# Patient Record
Sex: FEMALE | Race: WHITE | Employment: OTHER | URBAN - NONMETROPOLITAN AREA
[De-identification: names, ages, dates, MRNs, and addresses within clinical notes are randomized per-mention and may not be internally consistent; named-entity substitution may affect disease eponyms.]

---

## 2017-01-03 RX ORDER — ALENDRONATE SODIUM 70 MG/1
TABLET ORAL
Qty: 12 TABLET | Refills: 0 | Status: SHIPPED | OUTPATIENT
Start: 2017-01-03 | End: 2017-03-25 | Stop reason: SDUPTHER

## 2017-01-15 DIAGNOSIS — Z78.0 POST-MENOPAUSAL: ICD-10-CM

## 2017-01-15 DIAGNOSIS — C50.911 BREAST CANCER, RIGHT (HCC): ICD-10-CM

## 2017-01-16 RX ORDER — ANASTROZOLE 1 MG/1
TABLET ORAL
Qty: 90 TABLET | Refills: 0 | Status: SHIPPED | OUTPATIENT
Start: 2017-01-16 | End: 2017-04-13 | Stop reason: SDUPTHER

## 2017-02-24 ENCOUNTER — OFFICE VISIT (OUTPATIENT)
Dept: ONCOLOGY | Age: 67
End: 2017-02-24

## 2017-02-24 VITALS
WEIGHT: 199.4 LBS | HEART RATE: 94 BPM | HEIGHT: 63 IN | RESPIRATION RATE: 18 BRPM | OXYGEN SATURATION: 95 % | BODY MASS INDEX: 35.33 KG/M2 | TEMPERATURE: 97.5 F | SYSTOLIC BLOOD PRESSURE: 140 MMHG | DIASTOLIC BLOOD PRESSURE: 72 MMHG

## 2017-02-24 DIAGNOSIS — Z51.81 ENCOUNTER FOR MONITORING AROMATASE INHIBITOR THERAPY: Primary | ICD-10-CM

## 2017-02-24 DIAGNOSIS — Z79.811 ENCOUNTER FOR MONITORING AROMATASE INHIBITOR THERAPY: Primary | ICD-10-CM

## 2017-02-24 PROCEDURE — 99214 OFFICE O/P EST MOD 30 MIN: CPT | Performed by: INTERNAL MEDICINE

## 2017-03-01 ENCOUNTER — OFFICE VISIT (OUTPATIENT)
Dept: FAMILY MEDICINE CLINIC | Age: 67
End: 2017-03-01

## 2017-03-01 VITALS
SYSTOLIC BLOOD PRESSURE: 114 MMHG | BODY MASS INDEX: 36.7 KG/M2 | WEIGHT: 199.4 LBS | HEART RATE: 62 BPM | RESPIRATION RATE: 12 BRPM | DIASTOLIC BLOOD PRESSURE: 68 MMHG | TEMPERATURE: 97.9 F | HEIGHT: 62 IN

## 2017-03-01 DIAGNOSIS — M15.9 PRIMARY OSTEOARTHRITIS INVOLVING MULTIPLE JOINTS: Chronic | ICD-10-CM

## 2017-03-01 DIAGNOSIS — R73.03 PREDIABETES: Chronic | ICD-10-CM

## 2017-03-01 DIAGNOSIS — E78.00 PURE HYPERCHOLESTEROLEMIA: Chronic | ICD-10-CM

## 2017-03-01 DIAGNOSIS — F41.1 GAD (GENERALIZED ANXIETY DISORDER): Chronic | ICD-10-CM

## 2017-03-01 DIAGNOSIS — I10 ESSENTIAL (PRIMARY) HYPERTENSION: Primary | Chronic | ICD-10-CM

## 2017-03-01 DIAGNOSIS — F33.42 RECURRENT MAJOR DEPRESSIVE DISORDER, IN FULL REMISSION (HCC): ICD-10-CM

## 2017-03-01 DIAGNOSIS — R73.9 HYPERGLYCEMIA: ICD-10-CM

## 2017-03-01 PROCEDURE — 99214 OFFICE O/P EST MOD 30 MIN: CPT | Performed by: FAMILY MEDICINE

## 2017-03-01 RX ORDER — DIPHENHYDRAMINE HCL 50 MG
50 CAPSULE ORAL NIGHTLY PRN
COMMUNITY
End: 2018-10-29

## 2017-03-26 DIAGNOSIS — F41.9 ANXIETY: ICD-10-CM

## 2017-03-26 DIAGNOSIS — F33.0 MAJOR DEPRESSIVE DISORDER, RECURRENT EPISODE, MILD (HCC): ICD-10-CM

## 2017-03-27 RX ORDER — CITALOPRAM 20 MG/1
TABLET ORAL
Qty: 90 TABLET | Refills: 3 | Status: SHIPPED | OUTPATIENT
Start: 2017-03-27 | End: 2018-04-17 | Stop reason: SDUPTHER

## 2017-03-27 RX ORDER — ALENDRONATE SODIUM 70 MG/1
TABLET ORAL
Qty: 12 TABLET | Refills: 0 | Status: SHIPPED | OUTPATIENT
Start: 2017-03-27 | End: 2017-06-16 | Stop reason: SDUPTHER

## 2017-04-13 DIAGNOSIS — Z78.0 POST-MENOPAUSAL: ICD-10-CM

## 2017-04-13 DIAGNOSIS — C50.911 BREAST CANCER, RIGHT (HCC): ICD-10-CM

## 2017-04-13 RX ORDER — ANASTROZOLE 1 MG/1
TABLET ORAL
Qty: 90 TABLET | Refills: 0 | Status: SHIPPED | OUTPATIENT
Start: 2017-04-13 | End: 2017-07-11 | Stop reason: SDUPTHER

## 2017-04-27 LAB — HBA1C MFR BLD: 6.1 %

## 2017-05-03 ENCOUNTER — TELEPHONE (OUTPATIENT)
Dept: FAMILY MEDICINE CLINIC | Age: 67
End: 2017-05-03

## 2017-05-22 RX ORDER — BUSPIRONE HYDROCHLORIDE 10 MG/1
TABLET ORAL
Qty: 60 TABLET | Refills: 11 | Status: SHIPPED | OUTPATIENT
Start: 2017-05-22 | End: 2018-05-24 | Stop reason: SDUPTHER

## 2017-06-17 RX ORDER — ALENDRONATE SODIUM 70 MG/1
TABLET ORAL
Qty: 12 TABLET | Refills: 0 | Status: SHIPPED | OUTPATIENT
Start: 2017-06-17 | End: 2017-09-05 | Stop reason: SDUPTHER

## 2017-06-28 ENCOUNTER — OFFICE VISIT (OUTPATIENT)
Dept: CARDIOLOGY | Age: 67
End: 2017-06-28

## 2017-06-28 VITALS
HEIGHT: 62 IN | WEIGHT: 197.6 LBS | SYSTOLIC BLOOD PRESSURE: 160 MMHG | HEART RATE: 64 BPM | BODY MASS INDEX: 36.36 KG/M2 | DIASTOLIC BLOOD PRESSURE: 88 MMHG

## 2017-06-28 DIAGNOSIS — I10 ESSENTIAL HYPERTENSION: Primary | ICD-10-CM

## 2017-06-28 DIAGNOSIS — E78.01 FAMILIAL HYPERCHOLESTEROLEMIA: ICD-10-CM

## 2017-06-28 PROCEDURE — 99213 OFFICE O/P EST LOW 20 MIN: CPT | Performed by: NUCLEAR MEDICINE

## 2017-07-11 DIAGNOSIS — Z78.0 POST-MENOPAUSAL: ICD-10-CM

## 2017-07-11 DIAGNOSIS — C50.911 BREAST CANCER, RIGHT (HCC): ICD-10-CM

## 2017-07-11 RX ORDER — ANASTROZOLE 1 MG/1
TABLET ORAL
Qty: 90 TABLET | Refills: 0 | Status: SHIPPED | OUTPATIENT
Start: 2017-07-11 | End: 2017-12-26 | Stop reason: SDUPTHER

## 2017-07-12 RX ORDER — VALSARTAN AND HYDROCHLOROTHIAZIDE 320; 25 MG/1; MG/1
TABLET, FILM COATED ORAL
Qty: 90 TABLET | Refills: 0 | Status: SHIPPED | OUTPATIENT
Start: 2017-07-12 | End: 2017-10-16 | Stop reason: SDUPTHER

## 2017-08-07 ENCOUNTER — TELEPHONE (OUTPATIENT)
Dept: FAMILY MEDICINE CLINIC | Age: 67
End: 2017-08-07

## 2017-08-07 DIAGNOSIS — E66.9 OBESITY (BMI 30.0-34.9): Chronic | ICD-10-CM

## 2017-08-07 DIAGNOSIS — E78.00 PURE HYPERCHOLESTEROLEMIA: Chronic | ICD-10-CM

## 2017-08-07 DIAGNOSIS — R73.9 HYPERGLYCEMIA: ICD-10-CM

## 2017-08-07 DIAGNOSIS — R73.03 PREDIABETES: Chronic | ICD-10-CM

## 2017-08-07 DIAGNOSIS — I10 ESSENTIAL (PRIMARY) HYPERTENSION: Primary | Chronic | ICD-10-CM

## 2017-08-14 ENCOUNTER — TELEPHONE (OUTPATIENT)
Dept: CARDIOLOGY CLINIC | Age: 67
End: 2017-08-14

## 2017-08-14 ENCOUNTER — TELEPHONE (OUTPATIENT)
Dept: FAMILY MEDICINE CLINIC | Age: 67
End: 2017-08-14

## 2017-08-14 DIAGNOSIS — E78.00 PURE HYPERCHOLESTEROLEMIA: Primary | Chronic | ICD-10-CM

## 2017-08-14 RX ORDER — ATORVASTATIN CALCIUM 20 MG/1
TABLET, FILM COATED ORAL
Qty: 90 TABLET | Refills: 3 | Status: SHIPPED | OUTPATIENT
Start: 2017-08-14 | End: 2018-08-05 | Stop reason: SDUPTHER

## 2017-08-18 ENCOUNTER — TELEPHONE (OUTPATIENT)
Dept: CARDIOLOGY CLINIC | Age: 67
End: 2017-08-18

## 2017-08-24 ENCOUNTER — TELEPHONE (OUTPATIENT)
Dept: CARDIOLOGY CLINIC | Age: 67
End: 2017-08-24

## 2017-08-24 DIAGNOSIS — C50.919 MALIGNANT NEOPLASM OF FEMALE BREAST, UNSPECIFIED LATERALITY, UNSPECIFIED SITE OF BREAST: Primary | ICD-10-CM

## 2017-08-25 ENCOUNTER — OFFICE VISIT (OUTPATIENT)
Dept: ONCOLOGY | Age: 67
End: 2017-08-25
Payer: MEDICARE

## 2017-08-25 ENCOUNTER — HOSPITAL ENCOUNTER (OUTPATIENT)
Dept: INFUSION THERAPY | Age: 67
Discharge: HOME | End: 2017-08-25
Payer: MEDICARE

## 2017-08-25 VITALS
DIASTOLIC BLOOD PRESSURE: 71 MMHG | HEIGHT: 62 IN | RESPIRATION RATE: 18 BRPM | BODY MASS INDEX: 34.41 KG/M2 | WEIGHT: 187 LBS | HEART RATE: 85 BPM | SYSTOLIC BLOOD PRESSURE: 136 MMHG | OXYGEN SATURATION: 99 % | TEMPERATURE: 97.8 F

## 2017-08-25 DIAGNOSIS — Z12.31 ENCOUNTER FOR SCREENING MAMMOGRAM FOR MALIGNANT NEOPLASM OF BREAST: ICD-10-CM

## 2017-08-25 DIAGNOSIS — C50.919 MALIGNANT NEOPLASM OF FEMALE BREAST, UNSPECIFIED LATERALITY, UNSPECIFIED SITE OF BREAST: ICD-10-CM

## 2017-08-25 DIAGNOSIS — Z85.3 HISTORY OF BREAST CANCER: Primary | ICD-10-CM

## 2017-08-25 LAB
BASINOPHIL, AUTOMATED: 1 % (ref 0–12)
BUN, WHOLE BLOOD: 19 MG/DL (ref 8–26)
CHLORIDE, WHOLE BLOOD: 100 MEQ/L (ref 98–109)
CREATININE, WHOLE BLOOD: 0.8 MG/DL (ref 0.5–1.2)
EOSINOPHILS RELATIVE PERCENT: 3 % (ref 0–12)
GFR, ESTIMATED: 76 ML/MIN/1.73M2
GLUCOSE, WHOLE BLOOD: 114 MG/DL (ref 70–108)
HCT VFR BLD CALC: 43.6 % (ref 37–47)
HEMOGLOBIN: 14.7 GM/DL (ref 12–16)
IONIZED CALCIUM, WHOLE BLOOD: 1.25 MMOL/L (ref 1.12–1.32)
LYMPHOCYTES # BLD: 30 % (ref 15–47)
MCH RBC QN AUTO: 30 PG (ref 27–31)
MCHC RBC AUTO-ENTMCNC: 33.6 GM/DL (ref 33–37)
MCV RBC AUTO: 89 FL (ref 81–99)
MONOCYTES: 9 % (ref 0–12)
PDW BLD-RTO: 12.5 % (ref 11.5–14.5)
PLATELET # BLD: 304 THOU/MM3 (ref 130–400)
PMV BLD AUTO: 7.6 MCM (ref 7.4–10.4)
POTASSIUM, WHOLE BLOOD: 4.4 MEQ/L (ref 3.5–4.9)
RBC # BLD: 4.89 MILL/MM3 (ref 4.2–5.4)
SEG NEUTROPHILS: 58 % (ref 43–75)
SODIUM, WHOLE BLOOD: 140 MEQ/L (ref 138–146)
TOTAL CO2, WHOLE BLOOD: 30 MEQ/L (ref 23–33)
WBC # BLD: 7.3 THOU/MM3 (ref 4.8–10.8)

## 2017-08-25 PROCEDURE — 85025 COMPLETE CBC W/AUTO DIFF WBC: CPT

## 2017-08-25 PROCEDURE — 80047 BASIC METABLC PNL IONIZED CA: CPT

## 2017-08-25 PROCEDURE — 99211 OFF/OP EST MAY X REQ PHY/QHP: CPT

## 2017-08-25 PROCEDURE — 36415 COLL VENOUS BLD VENIPUNCTURE: CPT

## 2017-08-25 PROCEDURE — 99214 OFFICE O/P EST MOD 30 MIN: CPT | Performed by: INTERNAL MEDICINE

## 2017-09-05 RX ORDER — ALENDRONATE SODIUM 70 MG/1
TABLET ORAL
Qty: 12 TABLET | Refills: 0 | Status: SHIPPED | OUTPATIENT
Start: 2017-09-05 | End: 2017-11-26 | Stop reason: SDUPTHER

## 2017-09-19 DIAGNOSIS — M15.9 PRIMARY OSTEOARTHRITIS INVOLVING MULTIPLE JOINTS: ICD-10-CM

## 2017-09-19 RX ORDER — MELOXICAM 15 MG/1
TABLET ORAL
Qty: 90 TABLET | Refills: 3 | Status: SHIPPED | OUTPATIENT
Start: 2017-09-19 | End: 2018-09-06 | Stop reason: SDUPTHER

## 2017-10-16 ENCOUNTER — OFFICE VISIT (OUTPATIENT)
Dept: FAMILY MEDICINE CLINIC | Age: 67
End: 2017-10-16
Payer: MEDICARE

## 2017-10-16 VITALS
RESPIRATION RATE: 12 BRPM | BODY MASS INDEX: 33.97 KG/M2 | SYSTOLIC BLOOD PRESSURE: 138 MMHG | TEMPERATURE: 97.8 F | HEART RATE: 70 BPM | HEIGHT: 62 IN | WEIGHT: 184.6 LBS | DIASTOLIC BLOOD PRESSURE: 70 MMHG

## 2017-10-16 DIAGNOSIS — I10 ESSENTIAL (PRIMARY) HYPERTENSION: Primary | Chronic | ICD-10-CM

## 2017-10-16 DIAGNOSIS — Z78.0 POST-MENOPAUSE: ICD-10-CM

## 2017-10-16 DIAGNOSIS — M85.89 OSTEOPENIA OF MULTIPLE SITES: Chronic | ICD-10-CM

## 2017-10-16 DIAGNOSIS — F41.1 GAD (GENERALIZED ANXIETY DISORDER): Chronic | ICD-10-CM

## 2017-10-16 DIAGNOSIS — E78.00 PURE HYPERCHOLESTEROLEMIA: Chronic | ICD-10-CM

## 2017-10-16 DIAGNOSIS — F33.42 RECURRENT MAJOR DEPRESSIVE DISORDER, IN FULL REMISSION (HCC): Chronic | ICD-10-CM

## 2017-10-16 DIAGNOSIS — R73.03 PREDIABETES: Chronic | ICD-10-CM

## 2017-10-16 DIAGNOSIS — Z23 NEED FOR INFLUENZA VACCINATION: ICD-10-CM

## 2017-10-16 PROCEDURE — 99214 OFFICE O/P EST MOD 30 MIN: CPT | Performed by: FAMILY MEDICINE

## 2017-10-16 PROCEDURE — 90688 IIV4 VACCINE SPLT 0.5 ML IM: CPT | Performed by: FAMILY MEDICINE

## 2017-10-16 PROCEDURE — G0008 ADMIN INFLUENZA VIRUS VAC: HCPCS | Performed by: FAMILY MEDICINE

## 2017-10-16 RX ORDER — VALSARTAN AND HYDROCHLOROTHIAZIDE 320; 25 MG/1; MG/1
TABLET, FILM COATED ORAL
Qty: 90 TABLET | Refills: 3 | Status: SHIPPED | OUTPATIENT
Start: 2017-10-16 | End: 2018-10-01 | Stop reason: SDUPTHER

## 2017-10-16 NOTE — PROGRESS NOTES
Chief Complaint   Patient presents with    6 Month Follow-Up     issues below       History obtained from the patient. SUBJECTIVE:  Sukhdeep Carpio is a 69 y.o. female that presents today for       1.) HTN:    HPI:     Taking meds as prescribed ?: yes  Tolerating well ?: yes  Side Effects ?: denies  BP at home ?: <140/90  Working on TLCS ?: yes  Chest Pain/SOB/Palpitations? denies    BP Readings from Last 3 Encounters:   10/16/17 138/70   08/25/17 136/71   06/28/17 (!) 160/88         2.) PreDM LAST VISIT: wts going up d/t estrogen blocker. Due for labs. Trying to watch diet. UPDATE TODAY: wts down a few labs. Had labs done, those are pending.        3.) HLD:    HPI:    Taking meds as prescribed ?: yes  Tolerating well ?: yes  Side Effects ?: denies  Muscle Pain?: denies  Working on TLCS ?: yes    No components found for: CHLPL  Lab Results   Component Value Date    TRIG 139 09/28/2016    TRIG 112 10/12/2014     Lab Results   Component Value Date    HDL 53 09/28/2016    HDL 53 10/12/2014     Lab Results   Component Value Date    LDLCALC 73 09/28/2016    LDLCALC 77 10/12/2014     No results found for: LABVLDL      4.) Depression/Anxiety: moods stable on celexa and buspar. Denies SI/HI. meds working well. Would like to continue. Age/Gender Health Maintenance    Lipid - ; LDL 91; HDL 60;  (JAN 2016)  Lab Results   Component Value Date    CHOL 154 09/28/2016    CHOL 152 10/12/2014     Lab Results   Component Value Date    TRIG 139 09/28/2016    TRIG 112 10/12/2014     Lab Results   Component Value Date    HDL 53 09/28/2016    HDL 53 10/12/2014     Lab Results   Component Value Date    LDLCALC 73 09/28/2016    LDLCALC 77 10/12/2014     Lab Results   Component Value Date    VLDL 28 09/28/2016     No results found for: CHOLHDLRATIO      DM Screen - 114 (JAN 2016) with A1C 6.0 (JAN 2016)  Glucose: 134/A1C 6.1 (SEPT 2016)    Colon Cancer Screening - NEG June 2016, repeat 10 years.    Lung Cancer Shade Stager to complete a self tracking handout on Blood Pressures  and instructed them to bring it with them to her next appointment. Barriers to learning and self management: none    Discussed use, benefit, and side effects of prescribed medications. Barriers to medication compliance addressed. All patient questions answered. Pt voiced understanding.        Electronically signed by Jorge Dorsey DO on 10/16/2017 at 2:09 PM

## 2017-10-16 NOTE — PROGRESS NOTES
Visit Information    Have you changed or started any medications since your last visit including any over-the-counter medicines, vitamins, or herbal medicines? no   Are you having any side effects from any of your medications? -  no  Have you stopped taking any of your medications? Is so, why? -  no    Have you seen any other physician or provider since your last visit? No  Have you had any other diagnostic tests since your last visit? Yes - Records Requested  Have you been seen in the emergency room and/or had an admission to a hospital since we last saw you? No  Have you had your routine dental cleaning in the past 6 months? no    Have you activated your [employer] account? If not, what are your barriers? Yes     Patient Care Team:  Anjel Dumont DO as PCP - General (Family Medicine)    Medical History Review  Past Medical, Family, and Social History reviewed and does contribute to the patient presenting condition    Health Maintenance   Topic Date Due    DTaP/Tdap/Td vaccine (1 - Tdap) 05/10/1967    Zostavax vaccine  05/10/2008    DEXA (modify frequency per FRAX score)  05/27/2017    Flu vaccine (1) 09/01/2017    Lipid screen  09/28/2017    Pneumococcal low/med risk (2 of 2 - PPSV23) 10/18/2017    Breast cancer screen  12/15/2017    Diabetes screen  04/27/2020    Colon cancer screen colonoscopy  06/01/2026    Hepatitis C screen  Completed       After obtaining consent, and per orders of Dr. Conteh, injection of Fluzone 0.5 mL given in Left deltoid by Tray Temple CMA ([facility]). Patient instructed to report any adverse reaction to me immediately. Patient tolerated injection well.

## 2017-10-16 NOTE — PATIENT INSTRUCTIONS
arms.  ¨ Lightheadedness or sudden weakness. ¨ A fast or irregular heartbeat. · You have symptoms of a stroke. These may include:  ¨ Sudden numbness, tingling, weakness, or loss of movement in your face, arm, or leg, especially on only one side of your body. ¨ Sudden vision changes. ¨ Sudden trouble speaking. ¨ Sudden confusion or trouble understanding simple statements. ¨ Sudden problems with walking or balance. ¨ A sudden, severe headache that is different from past headaches. · You have severe back or belly pain. Do not wait until your blood pressure comes down on its own. Get help right away. Call your doctor now or seek immediate care if:  · Your blood pressure is much higher than normal (such as 180/110 or higher), but you don't have symptoms. · You think high blood pressure is causing symptoms, such as:  ¨ Severe headache. ¨ Blurry vision. Watch closely for changes in your health, and be sure to contact your doctor if:  · Your blood pressure measures 140/90 or higher at least 2 times. That means the top number is 140 or higher or the bottom number is 90 or higher, or both. · You think you may be having side effects from your blood pressure medicine. · Your blood pressure is usually normal, but it goes above normal at least 2 times. Where can you learn more? Go to https://MobiApps.PHmHealth. org and sign in to your Seawind account. Enter Z336 in the KylesDataLocker box to learn more about \"High Blood Pressure: Care Instructions. \"     If you do not have an account, please click on the \"Sign Up Now\" link. Current as of: August 8, 2016  Content Version: 11.3  © 1914-9255 SomethingIndie. Care instructions adapted under license by [facility] ([facility]). If you have questions about a medical condition or this instruction, always ask your healthcare professional. [address] any warranty or liability for your use of this information.        Patient Education about a medical condition or this instruction, always ask your healthcare professional. [place] [address] any warranty or liability for your use of this information.

## 2017-10-17 ENCOUNTER — TELEPHONE (OUTPATIENT)
Dept: FAMILY MEDICINE CLINIC | Age: 67
End: 2017-10-17

## 2017-11-27 RX ORDER — ALENDRONATE SODIUM 70 MG/1
TABLET ORAL
Qty: 12 TABLET | Refills: 0 | Status: SHIPPED | OUTPATIENT
Start: 2017-11-27 | End: 2018-06-20 | Stop reason: SDUPTHER

## 2017-12-26 DIAGNOSIS — Z17.0 MALIGNANT NEOPLASM OF RIGHT BREAST IN FEMALE, ESTROGEN RECEPTOR POSITIVE, UNSPECIFIED SITE OF BREAST (HCC): ICD-10-CM

## 2017-12-26 DIAGNOSIS — C50.911 MALIGNANT NEOPLASM OF RIGHT BREAST IN FEMALE, ESTROGEN RECEPTOR POSITIVE, UNSPECIFIED SITE OF BREAST (HCC): ICD-10-CM

## 2017-12-26 DIAGNOSIS — Z78.0 POST-MENOPAUSAL: ICD-10-CM

## 2017-12-26 RX ORDER — ANASTROZOLE 1 MG/1
1 TABLET ORAL DAILY
Qty: 90 TABLET | Refills: 0 | Status: SHIPPED | OUTPATIENT
Start: 2017-12-26 | End: 2018-03-23 | Stop reason: SDUPTHER

## 2018-01-02 ENCOUNTER — HOSPITAL ENCOUNTER (OUTPATIENT)
Dept: WOMENS IMAGING | Age: 68
Discharge: HOME | End: 2018-01-02
Payer: MEDICARE

## 2018-01-02 DIAGNOSIS — Z85.3 HISTORY OF BREAST CANCER: ICD-10-CM

## 2018-01-02 DIAGNOSIS — Z78.0 POST-MENOPAUSE: ICD-10-CM

## 2018-01-02 DIAGNOSIS — Z12.31 ENCOUNTER FOR SCREENING MAMMOGRAM FOR MALIGNANT NEOPLASM OF BREAST: ICD-10-CM

## 2018-01-02 DIAGNOSIS — M85.89 OSTEOPENIA OF MULTIPLE SITES: Chronic | ICD-10-CM

## 2018-01-02 PROCEDURE — 77080 DXA BONE DENSITY AXIAL: CPT

## 2018-01-02 PROCEDURE — 77063 BREAST TOMOSYNTHESIS BI: CPT

## 2018-01-03 ENCOUNTER — TELEPHONE (OUTPATIENT)
Dept: FAMILY MEDICINE CLINIC | Age: 68
End: 2018-01-03

## 2018-01-09 ENCOUNTER — HOSPITAL ENCOUNTER (OUTPATIENT)
Dept: WOMENS IMAGING | Age: 68
Discharge: HOME | End: 2018-01-09
Payer: MEDICARE

## 2018-01-09 DIAGNOSIS — R92.1 BREAST CALCIFICATIONS: ICD-10-CM

## 2018-01-09 PROCEDURE — G0279 TOMOSYNTHESIS, MAMMO: HCPCS

## 2018-01-10 DIAGNOSIS — C50.919 MALIGNANT NEOPLASM OF FEMALE BREAST, UNSPECIFIED ESTROGEN RECEPTOR STATUS, UNSPECIFIED LATERALITY, UNSPECIFIED SITE OF BREAST (HCC): Primary | ICD-10-CM

## 2018-02-23 ENCOUNTER — OFFICE VISIT (OUTPATIENT)
Dept: ONCOLOGY | Age: 68
End: 2018-02-23
Payer: MEDICARE

## 2018-02-23 ENCOUNTER — HOSPITAL ENCOUNTER (OUTPATIENT)
Dept: INFUSION THERAPY | Age: 68
Discharge: HOME | End: 2018-02-23
Payer: MEDICARE

## 2018-02-23 VITALS
OXYGEN SATURATION: 98 % | SYSTOLIC BLOOD PRESSURE: 125 MMHG | HEIGHT: 62 IN | BODY MASS INDEX: 36.03 KG/M2 | DIASTOLIC BLOOD PRESSURE: 67 MMHG | RESPIRATION RATE: 18 BRPM | HEART RATE: 92 BPM | TEMPERATURE: 98.1 F | WEIGHT: 195.8 LBS

## 2018-02-23 DIAGNOSIS — Z85.3 HISTORY OF BREAST CANCER: ICD-10-CM

## 2018-02-23 DIAGNOSIS — C50.919 MALIGNANT NEOPLASM OF FEMALE BREAST, UNSPECIFIED ESTROGEN RECEPTOR STATUS, UNSPECIFIED LATERALITY, UNSPECIFIED SITE OF BREAST (HCC): Primary | ICD-10-CM

## 2018-02-23 LAB
ALBUMIN SERPL-MCNC: 4.2 G/DL (ref 3.5–5.1)
ALP BLD-CCNC: 102 U/L (ref 38–126)
ALT SERPL-CCNC: 27 U/L (ref 11–66)
AST SERPL-CCNC: 23 U/L (ref 5–40)
BASINOPHIL, AUTOMATED: 1 % (ref 0–12)
BILIRUB SERPL-MCNC: 0.4 MG/DL (ref 0.3–1.2)
BILIRUBIN DIRECT: < 0.2 MG/DL (ref 0–0.3)
BUN, WHOLE BLOOD: 26 MG/DL (ref 8–26)
CHLORIDE, WHOLE BLOOD: 101 MEQ/L (ref 98–109)
CREATININE, WHOLE BLOOD: 0.7 MG/DL (ref 0.5–1.2)
EOSINOPHILS RELATIVE PERCENT: 3 % (ref 0–12)
GFR, ESTIMATED: 88 ML/MIN/1.73M2
GLUCOSE, WHOLE BLOOD: 97 MG/DL (ref 70–108)
HCT VFR BLD CALC: 36.9 % (ref 37–47)
HEMOGLOBIN: 12.1 GM/DL (ref 12–16)
IONIZED CALCIUM, WHOLE BLOOD: 1.17 MMOL/L (ref 1.12–1.32)
LYMPHOCYTES # BLD: 27 % (ref 15–47)
MCH RBC QN AUTO: 27.4 PG (ref 27–31)
MCHC RBC AUTO-ENTMCNC: 32.8 GM/DL (ref 33–37)
MCV RBC AUTO: 84 FL (ref 81–99)
MONOCYTES: 8 % (ref 0–12)
PDW BLD-RTO: 13.8 % (ref 11.5–14.5)
PLATELET # BLD: 287 THOU/MM3 (ref 130–400)
PMV BLD AUTO: 8.1 FL (ref 7.4–10.4)
POTASSIUM, WHOLE BLOOD: 3.5 MEQ/L (ref 3.5–4.9)
RBC # BLD: 4.42 MILL/MM3 (ref 4.2–5.4)
SEG NEUTROPHILS: 62 % (ref 43–75)
SODIUM, WHOLE BLOOD: 141 MEQ/L (ref 138–146)
TOTAL CO2, WHOLE BLOOD: 28 MEQ/L (ref 23–33)
TOTAL PROTEIN: 7 G/DL (ref 6.1–8)
WBC # BLD: 6.8 THOU/MM3 (ref 4.8–10.8)

## 2018-02-23 PROCEDURE — 99211 OFF/OP EST MAY X REQ PHY/QHP: CPT

## 2018-02-23 PROCEDURE — 99214 OFFICE O/P EST MOD 30 MIN: CPT | Performed by: INTERNAL MEDICINE

## 2018-02-23 PROCEDURE — 80076 HEPATIC FUNCTION PANEL: CPT

## 2018-02-23 PROCEDURE — 85025 COMPLETE CBC W/AUTO DIFF WBC: CPT

## 2018-02-23 PROCEDURE — 80047 BASIC METABLC PNL IONIZED CA: CPT

## 2018-02-23 PROCEDURE — 36415 COLL VENOUS BLD VENIPUNCTURE: CPT

## 2018-03-16 ENCOUNTER — TELEPHONE (OUTPATIENT)
Dept: FAMILY MEDICINE CLINIC | Age: 68
End: 2018-03-16

## 2018-03-16 DIAGNOSIS — R73.03 PREDIABETES: Primary | ICD-10-CM

## 2018-03-16 DIAGNOSIS — R73.9 HYPERGLYCEMIA: ICD-10-CM

## 2018-03-16 NOTE — TELEPHONE ENCOUNTER
Pt informed. She would like the order faxed to [facility]. Order faxed to [phone].

## 2018-03-23 DIAGNOSIS — Z78.0 POST-MENOPAUSAL: ICD-10-CM

## 2018-03-23 DIAGNOSIS — C50.911 MALIGNANT NEOPLASM OF RIGHT BREAST IN FEMALE, ESTROGEN RECEPTOR POSITIVE, UNSPECIFIED SITE OF BREAST (HCC): ICD-10-CM

## 2018-03-23 DIAGNOSIS — Z17.0 MALIGNANT NEOPLASM OF RIGHT BREAST IN FEMALE, ESTROGEN RECEPTOR POSITIVE, UNSPECIFIED SITE OF BREAST (HCC): ICD-10-CM

## 2018-03-23 RX ORDER — ANASTROZOLE 1 MG/1
1 TABLET ORAL DAILY
Qty: 90 TABLET | Refills: 3 | Status: SHIPPED | OUTPATIENT
Start: 2018-03-23 | End: 2019-03-14 | Stop reason: SDUPTHER

## 2018-04-17 DIAGNOSIS — F33.0 MAJOR DEPRESSIVE DISORDER, RECURRENT EPISODE, MILD (HCC): ICD-10-CM

## 2018-04-17 DIAGNOSIS — F41.9 ANXIETY: ICD-10-CM

## 2018-04-17 RX ORDER — CITALOPRAM 20 MG/1
TABLET ORAL
Qty: 90 TABLET | Refills: 3 | Status: SHIPPED | OUTPATIENT
Start: 2018-04-17 | End: 2019-04-14 | Stop reason: SDUPTHER

## 2018-04-24 ENCOUNTER — TELEPHONE (OUTPATIENT)
Dept: FAMILY MEDICINE CLINIC | Age: 68
End: 2018-04-24

## 2018-04-24 LAB
AVERAGE GLUCOSE: 135
HBA1C MFR BLD: 6.3 %

## 2018-04-25 ENCOUNTER — OFFICE VISIT (OUTPATIENT)
Dept: FAMILY MEDICINE CLINIC | Age: 68
End: 2018-04-25
Payer: MEDICARE

## 2018-04-25 VITALS
WEIGHT: 197 LBS | TEMPERATURE: 98.3 F | RESPIRATION RATE: 16 BRPM | HEART RATE: 96 BPM | HEIGHT: 62 IN | SYSTOLIC BLOOD PRESSURE: 122 MMHG | DIASTOLIC BLOOD PRESSURE: 72 MMHG | BODY MASS INDEX: 36.25 KG/M2

## 2018-04-25 DIAGNOSIS — I10 ESSENTIAL (PRIMARY) HYPERTENSION: Primary | Chronic | ICD-10-CM

## 2018-04-25 DIAGNOSIS — M85.89 OSTEOPENIA OF MULTIPLE SITES: Chronic | ICD-10-CM

## 2018-04-25 DIAGNOSIS — R73.03 PREDIABETES: Chronic | ICD-10-CM

## 2018-04-25 DIAGNOSIS — F41.1 GAD (GENERALIZED ANXIETY DISORDER): Chronic | ICD-10-CM

## 2018-04-25 DIAGNOSIS — E78.00 PURE HYPERCHOLESTEROLEMIA: Chronic | ICD-10-CM

## 2018-04-25 DIAGNOSIS — E66.9 OBESITY (BMI 30-39.9): ICD-10-CM

## 2018-04-25 DIAGNOSIS — F33.42 RECURRENT MAJOR DEPRESSIVE DISORDER, IN FULL REMISSION (HCC): Chronic | ICD-10-CM

## 2018-04-25 PROCEDURE — 99214 OFFICE O/P EST MOD 30 MIN: CPT | Performed by: FAMILY MEDICINE

## 2018-04-25 PROCEDURE — 3288F FALL RISK ASSESSMENT DOCD: CPT | Performed by: FAMILY MEDICINE

## 2018-04-25 PROCEDURE — G8510 SCR DEP NEG, NO PLAN REQD: HCPCS | Performed by: FAMILY MEDICINE

## 2018-04-25 ASSESSMENT — PATIENT HEALTH QUESTIONNAIRE - PHQ9
SUM OF ALL RESPONSES TO PHQ9 QUESTIONS 1 & 2: 0
2. FEELING DOWN, DEPRESSED OR HOPELESS: 0
1. LITTLE INTEREST OR PLEASURE IN DOING THINGS: 0
SUM OF ALL RESPONSES TO PHQ QUESTIONS 1-9: 0

## 2018-05-24 DIAGNOSIS — F41.1 GAD (GENERALIZED ANXIETY DISORDER): Primary | Chronic | ICD-10-CM

## 2018-05-24 RX ORDER — BUSPIRONE HYDROCHLORIDE 10 MG/1
TABLET ORAL
Qty: 180 TABLET | Refills: 3 | Status: SHIPPED | OUTPATIENT
Start: 2018-05-24 | End: 2019-06-17 | Stop reason: SDUPTHER

## 2018-06-20 RX ORDER — ALENDRONATE SODIUM 70 MG/1
TABLET ORAL
Qty: 12 TABLET | Refills: 0 | Status: SHIPPED | OUTPATIENT
Start: 2018-06-20 | End: 2018-09-06 | Stop reason: SDUPTHER

## 2018-07-11 ENCOUNTER — HOSPITAL ENCOUNTER (OUTPATIENT)
Dept: WOMENS IMAGING | Age: 68
Discharge: HOME | End: 2018-07-11
Payer: MEDICARE

## 2018-07-11 DIAGNOSIS — Z09 FOLLOW-UP EXAM: ICD-10-CM

## 2018-07-11 DIAGNOSIS — R92.1 BREAST CALCIFICATION SEEN ON MAMMOGRAM: ICD-10-CM

## 2018-07-11 PROCEDURE — G0279 TOMOSYNTHESIS, MAMMO: HCPCS

## 2018-08-05 DIAGNOSIS — E78.00 PURE HYPERCHOLESTEROLEMIA: Chronic | ICD-10-CM

## 2018-08-06 RX ORDER — ATORVASTATIN CALCIUM 20 MG/1
TABLET, FILM COATED ORAL
Qty: 90 TABLET | Refills: 3 | Status: SHIPPED | OUTPATIENT
Start: 2018-08-06 | End: 2019-07-21 | Stop reason: SDUPTHER

## 2018-08-24 ENCOUNTER — OFFICE VISIT (OUTPATIENT)
Dept: ONCOLOGY | Age: 68
End: 2018-08-24
Payer: MEDICARE

## 2018-08-24 ENCOUNTER — HOSPITAL ENCOUNTER (OUTPATIENT)
Dept: INFUSION THERAPY | Age: 68
Discharge: HOME | End: 2018-08-24
Payer: MEDICARE

## 2018-08-24 VITALS
WEIGHT: 198 LBS | SYSTOLIC BLOOD PRESSURE: 127 MMHG | OXYGEN SATURATION: 97 % | DIASTOLIC BLOOD PRESSURE: 74 MMHG | BODY MASS INDEX: 36.44 KG/M2 | TEMPERATURE: 97.7 F | HEIGHT: 62 IN | RESPIRATION RATE: 18 BRPM | HEART RATE: 83 BPM

## 2018-08-24 DIAGNOSIS — C50.919 MALIGNANT NEOPLASM OF FEMALE BREAST, UNSPECIFIED ESTROGEN RECEPTOR STATUS, UNSPECIFIED LATERALITY, UNSPECIFIED SITE OF BREAST (HCC): ICD-10-CM

## 2018-08-24 DIAGNOSIS — C50.919 MALIGNANT NEOPLASM OF FEMALE BREAST, UNSPECIFIED ESTROGEN RECEPTOR STATUS, UNSPECIFIED LATERALITY, UNSPECIFIED SITE OF BREAST (HCC): Primary | ICD-10-CM

## 2018-08-24 LAB
ALBUMIN SERPL-MCNC: 4.4 G/DL (ref 3.5–5.1)
ALP BLD-CCNC: 109 U/L (ref 38–126)
ALT SERPL-CCNC: 29 U/L (ref 11–66)
AST SERPL-CCNC: 23 U/L (ref 5–40)
BASINOPHIL, AUTOMATED: 0 % (ref 0–3)
BILIRUB SERPL-MCNC: 0.3 MG/DL (ref 0.3–1.2)
BILIRUBIN DIRECT: < 0.2 MG/DL (ref 0–0.3)
BUN, WHOLE BLOOD: 17 MG/DL (ref 8–26)
CHLORIDE, WHOLE BLOOD: 100 MEQ/L (ref 98–109)
CREATININE, WHOLE BLOOD: 0.6 MG/DL (ref 0.5–1.2)
EOSINOPHILS RELATIVE PERCENT: 4 % (ref 0–4)
GFR, ESTIMATED: > 90 ML/MIN/1.73M2
GLUCOSE, WHOLE BLOOD: 153 MG/DL (ref 70–108)
HCT VFR BLD CALC: 33.6 % (ref 37–47)
HEMOGLOBIN: 11.5 GM/DL (ref 12–16)
IONIZED CALCIUM, WHOLE BLOOD: 1.21 MMOL/L (ref 1.12–1.32)
LYMPHOCYTES # BLD: 30 % (ref 15–47)
MCH RBC QN AUTO: 26.2 PG (ref 27–31)
MCHC RBC AUTO-ENTMCNC: 34.1 GM/DL (ref 33–37)
MCV RBC AUTO: 77 FL (ref 81–99)
MONOCYTES: 8 % (ref 0–12)
PDW BLD-RTO: 13.4 % (ref 11.5–14.5)
PLATELET # BLD: 303 THOU/MM3 (ref 130–400)
PMV BLD AUTO: 7.4 FL (ref 7.4–10.4)
POTASSIUM, WHOLE BLOOD: 3.8 MEQ/L (ref 3.5–4.9)
RBC # BLD: 4.37 MILL/MM3 (ref 4.2–5.4)
SEG NEUTROPHILS: 58 % (ref 43–75)
SODIUM, WHOLE BLOOD: 141 MEQ/L (ref 138–146)
TOTAL CO2, WHOLE BLOOD: 27 MEQ/L (ref 23–33)
TOTAL PROTEIN: 7.3 G/DL (ref 6.1–8)
WBC # BLD: 6.3 THOU/MM3 (ref 4.8–10.8)

## 2018-08-24 PROCEDURE — 99215 OFFICE O/P EST HI 40 MIN: CPT | Performed by: INTERNAL MEDICINE

## 2018-08-24 PROCEDURE — 36415 COLL VENOUS BLD VENIPUNCTURE: CPT

## 2018-08-24 PROCEDURE — 80076 HEPATIC FUNCTION PANEL: CPT

## 2018-08-24 PROCEDURE — 99211 OFF/OP EST MAY X REQ PHY/QHP: CPT

## 2018-08-24 PROCEDURE — 80047 BASIC METABLC PNL IONIZED CA: CPT

## 2018-08-24 PROCEDURE — 85025 COMPLETE CBC W/AUTO DIFF WBC: CPT

## 2018-08-24 RX ORDER — NEOMYCIN/POLYMYXIN B/PRAMOXINE 3.5-10K-1
500 CREAM (GRAM) TOPICAL DAILY
COMMUNITY
End: 2018-10-29

## 2018-08-30 NOTE — PROGRESS NOTES
[facility] PROFESSIONAL SERVICES  [facility] SPECIALISTS OF [facility]  [address] [facility] [address]  [address] [address]  Dept: [phone]  Dept Fax: [phone]  Loc: [phone]    Subjective:      Chief Complaint: Reginaldo Pemberton is a 77 y.o. female with a history of breast caner. The patient underwent a routine mammogram on 02/03/2014 which revealed an abnormality. She returned for a follow up study on 04/03/2014 which included an ultrasound that was suspicious of malignancy. A biopsy confirmed a grade 2 infiltrating ductal carcinoma in the right breast. The malignancy was estrogen receptor positive (100%), progesterone receptor positive (90%) and HER-2/julienne overexpression was negative. A lumpectomy with sentinel lymph node biopsy was completed on 04/24/2014. Surgical pathology revealed a tumor measuring 0.4 cm with four negative sentinel lymph nodes. The patient underwent right breast radiation from 06/09/2014 through 07/20/2014. She started to Anastrozole therapy on 07/30/2014. HPI:  The patient is here today for follow up evaluation. She is here today for follow-up of her history of breast cancer. On July 11, 2018 the patient had a follow-up screening mammogram completed. This found a cluster of fine calcifications in the left breast that appeared to be benign. However a follow-up mammogram in 6 months was recommended to demonstrate stability. This will be completed in December 2018. Otherwise the patient has no new complaints. She continues to take Arimidex therapy without significant complications. The patient takes Arimidex 1 mg tablet by mouth daily. She's had no serious toxicity. The patient also continues to take Fosamax for treatment of osteopenia. She tolerates this well and without significant complications. The patient has not had skeletal pain. She has no signs or symptoms to suggest distant metastatic malignancy.  The patient denies shortness of breath, chest pain, diarrhea, blood in their stool, a change in bladder habits, musculoskeletal pain,  muscle weakness, headaches, blurred vision, depression or anxiety. ECOG performance status is level 0. She was noted to have mild anemia today. She has no evidence of blood loss. PMH, SH, and FH:  I reviewed the PMH, SH and FH as noted on the electronic medical record. There have been no changes as noted in the previous documentation. Review of Systems  Constitutional: Negative. HENT: Negative. Eyes: Negative. Respiratory: Negative. Cardiovascular: Negative. Gastrointestinal: Negative. Genitourinary: Negative. Musculoskeletal: Negative. Skin: Negative. Neurological: Negative. Hematological: Negative. Psychiatric/Behavioral: Negative. Objective:   Physical Exam  Vitals:    08/24/18 1024   BP: 127/74   Pulse: 83   Resp: 18   Temp: 97.7 °F (36.5 °C)   SpO2: 97%   Vitals reviewed and are stable. Constitutional: Well-developed and well-nourished. No acute distress. HENT: Normocephalic and atraumatic. Eyes: Pupils are equal and reactive. No scleral icterus. Neck: Overall appearance is symmetrical. No identifiable masses. Chest: Inspection and palpation of chest is normal.  Pulmonary: Effort normal. No respiratory distress. Cardiovascular: RRR. No edema in any of the four extremities. Abdominal: Soft. No hepatomegaly or splenomegaly. Musculoskeletal: Gait is normal. Muscle strength and tone good. Neurological: Alert and oriented to person, place, and time. Judgment and thought content normal.  Skin: Skin is warm and dry. No rash. Psychiatric: Mood and affect appropriate for the clinical situation. Behavior is normal.        Data Analysis:    Hematology 8/24/2018 2/23/2018 8/25/2017   WBC 6.3 6.8 7.3   RBC 4.37 4.42 4.89   HGB 11.5 (L) 12.1 14.7   HCT 33.6 (L) 36.9 (L) 43.6   MCV 77 (L) 84 89   RDW 13.4 13.8 12.5    287 304     Assessment:   1. Breast cancer. 2.  Use of aromatase inhibitor. (Arimidex). 3.  Osteopenia. 4.   Anemia. Plan:   1. Continue Arimidex, 1 mg, by mouth daily. 2.  Continue Fosamax for bone health. 3.  Monitor for recurrence of malignancy. 4.  Monitor for side effects and toxicity from Arimidex and Fosamax  5. Follow up mammogram to be completed in December, 2018. 6.  Monitor hemoglobin/hematocrit and for any signs of blood loss. Maryanne Friday M.D.   [facility] of [address]  [address] [facility], [address] [place] [address]

## 2018-09-06 DIAGNOSIS — M15.9 PRIMARY OSTEOARTHRITIS INVOLVING MULTIPLE JOINTS: ICD-10-CM

## 2018-09-06 RX ORDER — ALENDRONATE SODIUM 70 MG/1
TABLET ORAL
Qty: 12 TABLET | Refills: 0 | Status: SHIPPED | OUTPATIENT
Start: 2018-09-06 | End: 2018-11-15 | Stop reason: SDUPTHER

## 2018-09-06 RX ORDER — MELOXICAM 15 MG/1
TABLET ORAL
Qty: 90 TABLET | Refills: 3 | Status: SHIPPED | OUTPATIENT
Start: 2018-09-06 | End: 2018-10-29

## 2018-09-24 ENCOUNTER — TELEPHONE (OUTPATIENT)
Dept: FAMILY MEDICINE CLINIC | Age: 68
End: 2018-09-24

## 2018-09-24 DIAGNOSIS — I10 ESSENTIAL (PRIMARY) HYPERTENSION: Primary | Chronic | ICD-10-CM

## 2018-09-24 DIAGNOSIS — R73.9 HYPERGLYCEMIA: ICD-10-CM

## 2018-09-24 DIAGNOSIS — R73.03 PREDIABETES: Chronic | ICD-10-CM

## 2018-09-24 NOTE — TELEPHONE ENCOUNTER
Pt due for fasting labs prior to next apt on 10/29/2018. Please call to have pt complete this. Thanks! ASSESSMENT & PLAN  1. Essential (primary) hypertension    - Comprehensive Metabolic Panel; Future  - Hemoglobin A1C; Future  - Lipid Panel; Future  - Microalbumin / Creatinine Urine Ratio; Future  - TSH with Reflex; Future    2. Prediabetes    - Comprehensive Metabolic Panel; Future  - Hemoglobin A1C; Future  - Lipid Panel; Future  - Microalbumin / Creatinine Urine Ratio; Future  - TSH with Reflex; Future    3. Hyperglycemia    - Comprehensive Metabolic Panel; Future  - Hemoglobin A1C; Future  - Lipid Panel; Future  - Microalbumin / Creatinine Urine Ratio; Future  - TSH with Reflex;  Future    Future Appointments  Date Time Provider Ramses Melissa   10/29/2018 10:20 AM Lidia Castorena DO [facility] [address] [facility] - [address] [place] [address]   11/30/2018 10:15 AM Guerda Vitale MD Oncology [facility] - [address] [place] [address]

## 2018-10-01 DIAGNOSIS — I10 ESSENTIAL (PRIMARY) HYPERTENSION: Chronic | ICD-10-CM

## 2018-10-01 RX ORDER — VALSARTAN AND HYDROCHLOROTHIAZIDE 320; 25 MG/1; MG/1
TABLET, FILM COATED ORAL
Qty: 90 TABLET | Refills: 3 | Status: SHIPPED | OUTPATIENT
Start: 2018-10-01 | End: 2019-09-16 | Stop reason: SDUPTHER

## 2018-10-23 LAB
AVERAGE GLUCOSE: NORMAL
CHOLESTEROL, TOTAL: 155 MG/DL
CHOLESTEROL/HDL RATIO: NORMAL
HBA1C MFR BLD: 6.4 %
HDLC SERPL-MCNC: 50 MG/DL (ref 35–70)
LDL CHOLESTEROL CALCULATED: 76 MG/DL (ref 0–160)
TRIGL SERPL-MCNC: 144 MG/DL
VLDLC SERPL CALC-MCNC: 29 MG/DL

## 2018-10-24 ENCOUNTER — TELEPHONE (OUTPATIENT)
Dept: FAMILY MEDICINE CLINIC | Age: 68
End: 2018-10-24

## 2018-10-28 NOTE — PROGRESS NOTES
narrative on file         Family History   Problem Relation Age of Onset   [facility] Cancer Mother         Lymphoma    Other Mother     Heart Disease Father         first bypass at age 43    Diabetes Father     Cancer Brother         Lymphoma    Colon Cancer Neg Hx     Breast Cancer Neg Hx          I have reviewed the patient's past medical history, past surgical history, allergies, medications, social and family history and I have made updates where appropriate. Review of Systems  Positive responses are highlighted in bold    Constitutional:  Fever, Chills, Night Sweats, Fatigue, Unexpected changes in weight  HENT:  Ear pain, Tinnitus, Nosebleeds, Trouble swallowing, Hearing loss, Sore throat  Cardiovascular:  Chest Pain, Palpitations, Orthopnea, Paroxysmal Nocturnal Dyspnea  Respiratory:  Cough, Wheezing, Shortness of breath, Chest tightness, Apnea  Gastrointestinal:  Nausea, Vomiting, Diarrhea, Constipation, Heartburn, Blood in stool  Genitourinary:  Difficulty or painful urination, Flank pain, Change in frequency, Urgency  Skin:  Color change, Rash, Itching, Wound  Musculoskeletal:  Joint pain, Back pain, Gait problems, Joint swelling, Myalgias  Neurological:  Dizziness, Headaches, Presyncope, Numbness, Seizures, Tremors  Endocrine:  Heat Intolerance, Cold Intolerance, Polydipsia, Polyphagia, Polyuria      PHYSICAL EXAM:  Vitals:    10/29/18 1001   BP: 136/82   Pulse: 92   Resp: 18   Temp: 98.2 °F (36.8 °C)   TempSrc: Oral   Weight: 198 lb (89.8 kg)   Height: 5' 2\" (1.575 m)     Body mass index is 36.21 kg/m².   Pain Score:   0 - No pain    VS Reviewed  General Appearance: A&O x 3, No acute distress,well developed and well- nourished  Eyes: pupils equal, round, and reactive to light, extraocular eye movements intact, conjunctivae and eye lids without erythema  ENT: external ear and ear canal clear bilaterally, TMs intact and regular, nose without deformity, nasal mucosa and turbinates normal without polyps,

## 2018-10-29 ENCOUNTER — OFFICE VISIT (OUTPATIENT)
Dept: FAMILY MEDICINE CLINIC | Age: 68
End: 2018-10-29
Payer: MEDICARE

## 2018-10-29 VITALS
DIASTOLIC BLOOD PRESSURE: 82 MMHG | BODY MASS INDEX: 36.44 KG/M2 | SYSTOLIC BLOOD PRESSURE: 136 MMHG | HEART RATE: 92 BPM | HEIGHT: 62 IN | WEIGHT: 198 LBS | RESPIRATION RATE: 18 BRPM | TEMPERATURE: 98.2 F

## 2018-10-29 DIAGNOSIS — E78.00 PURE HYPERCHOLESTEROLEMIA: ICD-10-CM

## 2018-10-29 DIAGNOSIS — R73.03 PREDIABETES: ICD-10-CM

## 2018-10-29 DIAGNOSIS — I10 ESSENTIAL (PRIMARY) HYPERTENSION: Primary | ICD-10-CM

## 2018-10-29 DIAGNOSIS — Z23 NEED FOR PNEUMOCOCCAL VACCINATION: ICD-10-CM

## 2018-10-29 DIAGNOSIS — F33.42 RECURRENT MAJOR DEPRESSIVE DISORDER, IN FULL REMISSION (HCC): ICD-10-CM

## 2018-10-29 DIAGNOSIS — F41.1 GAD (GENERALIZED ANXIETY DISORDER): ICD-10-CM

## 2018-10-29 DIAGNOSIS — E66.9 OBESITY (BMI 30-39.9): ICD-10-CM

## 2018-10-29 DIAGNOSIS — M85.89 OSTEOPENIA OF MULTIPLE SITES: ICD-10-CM

## 2018-10-29 PROCEDURE — 99214 OFFICE O/P EST MOD 30 MIN: CPT | Performed by: FAMILY MEDICINE

## 2018-10-29 PROCEDURE — G0009 ADMIN PNEUMOCOCCAL VACCINE: HCPCS | Performed by: FAMILY MEDICINE

## 2018-10-29 PROCEDURE — 90732 PPSV23 VACC 2 YRS+ SUBQ/IM: CPT | Performed by: FAMILY MEDICINE

## 2018-11-15 RX ORDER — ALENDRONATE SODIUM 35 MG/1
TABLET ORAL
Qty: 24 TABLET | Refills: 0 | Status: SHIPPED | OUTPATIENT
Start: 2018-11-15 | End: 2019-02-02 | Stop reason: SDUPTHER

## 2018-11-17 DIAGNOSIS — E66.9 OBESITY (BMI 30-39.9): ICD-10-CM

## 2018-11-17 DIAGNOSIS — R73.03 PREDIABETES: Chronic | ICD-10-CM

## 2018-11-30 ENCOUNTER — HOSPITAL ENCOUNTER (OUTPATIENT)
Dept: INFUSION THERAPY | Age: 68
Discharge: HOME | End: 2018-11-30
Payer: MEDICARE

## 2018-11-30 ENCOUNTER — OFFICE VISIT (OUTPATIENT)
Dept: ONCOLOGY | Age: 68
End: 2018-11-30
Payer: MEDICARE

## 2018-11-30 VITALS
WEIGHT: 195 LBS | RESPIRATION RATE: 18 BRPM | BODY MASS INDEX: 35.88 KG/M2 | TEMPERATURE: 97.9 F | SYSTOLIC BLOOD PRESSURE: 130 MMHG | OXYGEN SATURATION: 98 % | HEIGHT: 62 IN | HEART RATE: 97 BPM | DIASTOLIC BLOOD PRESSURE: 67 MMHG

## 2018-11-30 DIAGNOSIS — C50.919 MALIGNANT NEOPLASM OF FEMALE BREAST, UNSPECIFIED ESTROGEN RECEPTOR STATUS, UNSPECIFIED LATERALITY, UNSPECIFIED SITE OF BREAST (HCC): Primary | ICD-10-CM

## 2018-11-30 DIAGNOSIS — M85.89 OSTEOPENIA OF MULTIPLE SITES: Chronic | ICD-10-CM

## 2018-11-30 DIAGNOSIS — Z51.81 ENCOUNTER FOR MONITORING AROMATASE INHIBITOR THERAPY: ICD-10-CM

## 2018-11-30 DIAGNOSIS — Z79.811 ENCOUNTER FOR MONITORING AROMATASE INHIBITOR THERAPY: ICD-10-CM

## 2018-11-30 DIAGNOSIS — D64.9 CHRONIC ANEMIA: ICD-10-CM

## 2018-11-30 DIAGNOSIS — C50.919 MALIGNANT NEOPLASM OF FEMALE BREAST, UNSPECIFIED ESTROGEN RECEPTOR STATUS, UNSPECIFIED LATERALITY, UNSPECIFIED SITE OF BREAST (HCC): ICD-10-CM

## 2018-11-30 LAB
ABSOLUTE RETIC #: 76 THOU/MM3 (ref 20–115)
BASINOPHIL, AUTOMATED: 0 % (ref 0–3)
EOSINOPHILS RELATIVE PERCENT: 2 % (ref 0–4)
FERRITIN: 11 NG/ML (ref 10–291)
FOLATE: > 20 NG/ML (ref 4.8–24.2)
HCT VFR BLD CALC: 34.8 % (ref 37–47)
HEMOGLOBIN: 11.6 GM/DL (ref 12–16)
IMMATURE RETIC FRACT: 17.1 % (ref 3–15.9)
IRON: 52 UG/DL (ref 50–170)
LYMPHOCYTES # BLD: 33 % (ref 15–47)
MCH RBC QN AUTO: 25.2 PG (ref 27–31)
MCHC RBC AUTO-ENTMCNC: 33.4 GM/DL (ref 33–37)
MCV RBC AUTO: 75 FL (ref 81–99)
MONOCYTES: 7 % (ref 0–12)
PDW BLD-RTO: 13.5 % (ref 11.5–14.5)
PLATELET # BLD: 313 THOU/MM3 (ref 130–400)
PMV BLD AUTO: 7.9 FL (ref 7.4–10.4)
RBC # BLD: 4.61 MILL/MM3 (ref 4.2–5.4)
RETIC HEMOGLOBIN: 25.6 PG (ref 28.2–35.7)
RETICULOCYTE ABSOLUTE COUNT: 1.7 % (ref 0.5–2)
SEG NEUTROPHILS: 58 % (ref 43–75)
TOTAL IRON BINDING CAPACITY: 483 UG/DL (ref 171–450)
VITAMIN B-12: 153 PG/ML (ref 211–911)
WBC # BLD: 5.9 THOU/MM3 (ref 4.8–10.8)

## 2018-11-30 PROCEDURE — 82728 ASSAY OF FERRITIN: CPT

## 2018-11-30 PROCEDURE — 36415 COLL VENOUS BLD VENIPUNCTURE: CPT

## 2018-11-30 PROCEDURE — 99214 OFFICE O/P EST MOD 30 MIN: CPT | Performed by: INTERNAL MEDICINE

## 2018-11-30 PROCEDURE — 83010 ASSAY OF HAPTOGLOBIN QUANT: CPT

## 2018-11-30 PROCEDURE — 99211 OFF/OP EST MAY X REQ PHY/QHP: CPT

## 2018-11-30 PROCEDURE — 85025 COMPLETE CBC W/AUTO DIFF WBC: CPT

## 2018-11-30 PROCEDURE — 83540 ASSAY OF IRON: CPT

## 2018-11-30 PROCEDURE — 83550 IRON BINDING TEST: CPT

## 2018-11-30 PROCEDURE — 85046 RETICYTE/HGB CONCENTRATE: CPT

## 2018-11-30 PROCEDURE — 82746 ASSAY OF FOLIC ACID SERUM: CPT

## 2018-11-30 PROCEDURE — 82607 VITAMIN B-12: CPT

## 2018-12-02 LAB — HAPTOGLOBIN: 164 MG/DL (ref 30–200)

## 2018-12-05 PROBLEM — D64.9 CHRONIC ANEMIA: Status: ACTIVE | Noted: 2018-12-05

## 2019-01-14 ENCOUNTER — HOSPITAL ENCOUNTER (EMERGENCY)
Age: 69
Discharge: HOME | End: 2019-01-14
Payer: MEDICARE

## 2019-01-14 VITALS
DIASTOLIC BLOOD PRESSURE: 74 MMHG | SYSTOLIC BLOOD PRESSURE: 134 MMHG | TEMPERATURE: 97.8 F | RESPIRATION RATE: 16 BRPM | HEART RATE: 96 BPM | OXYGEN SATURATION: 97 % | WEIGHT: 198 LBS | HEIGHT: 63 IN | BODY MASS INDEX: 35.08 KG/M2

## 2019-01-14 DIAGNOSIS — J01.90 ACUTE VIRAL SINUSITIS: Primary | ICD-10-CM

## 2019-01-14 DIAGNOSIS — B97.89 ACUTE VIRAL SINUSITIS: Primary | ICD-10-CM

## 2019-01-14 DIAGNOSIS — H61.21 IMPACTED CERUMEN OF RIGHT EAR: ICD-10-CM

## 2019-01-14 PROCEDURE — 99213 OFFICE O/P EST LOW 20 MIN: CPT | Performed by: NURSE PRACTITIONER

## 2019-01-14 PROCEDURE — 99212 OFFICE O/P EST SF 10 MIN: CPT

## 2019-01-14 RX ORDER — DIPHENHYDRAMINE HCL 25 MG
25 CAPSULE ORAL EVERY 6 HOURS PRN
Qty: 30 CAPSULE | Refills: 0 | Status: SHIPPED | OUTPATIENT
Start: 2019-01-14 | End: 2019-01-24

## 2019-01-14 RX ORDER — PREDNISONE 20 MG/1
20 TABLET ORAL 2 TIMES DAILY
Qty: 10 TABLET | Refills: 0 | Status: SHIPPED | OUTPATIENT
Start: 2019-01-14 | End: 2019-01-19

## 2019-01-14 RX ORDER — AZELASTINE 1 MG/ML
1 SPRAY, METERED NASAL 2 TIMES DAILY
Qty: 1 BOTTLE | Refills: 0 | Status: SHIPPED | OUTPATIENT
Start: 2019-01-14 | End: 2020-01-14

## 2019-01-14 ASSESSMENT — ENCOUNTER SYMPTOMS
ABDOMINAL PAIN: 0
RHINORRHEA: 1
STRIDOR: 0
NAUSEA: 0
WHEEZING: 0
SINUS PAIN: 1
VOMITING: 0
CHEST TIGHTNESS: 0
CHOKING: 0
SORE THROAT: 1
COUGH: 1
DIARRHEA: 0
APNEA: 0
SHORTNESS OF BREATH: 0

## 2019-01-14 ASSESSMENT — PAIN DESCRIPTION - PAIN TYPE: TYPE: ACUTE PAIN

## 2019-01-14 ASSESSMENT — PAIN SCALES - GENERAL: PAINLEVEL_OUTOF10: 8

## 2019-01-14 ASSESSMENT — PAIN DESCRIPTION - DESCRIPTORS: DESCRIPTORS: SORE

## 2019-01-14 ASSESSMENT — PAIN DESCRIPTION - LOCATION: LOCATION: THROAT

## 2019-02-04 RX ORDER — ALENDRONATE SODIUM 35 MG/1
TABLET ORAL
Qty: 24 TABLET | Refills: 0 | Status: SHIPPED | OUTPATIENT
Start: 2019-02-04 | End: 2019-04-22 | Stop reason: SDUPTHER

## 2019-03-13 ENCOUNTER — HOSPITAL ENCOUNTER (OUTPATIENT)
Dept: WOMENS IMAGING | Age: 69
Discharge: HOME | End: 2019-03-13
Payer: MEDICARE

## 2019-03-13 DIAGNOSIS — Z85.3 HISTORY OF BREAST CANCER: ICD-10-CM

## 2019-03-13 DIAGNOSIS — R92.1 BREAST CALCIFICATION, LEFT: ICD-10-CM

## 2019-03-13 DIAGNOSIS — C50.919 MALIGNANT NEOPLASM OF FEMALE BREAST, UNSPECIFIED ESTROGEN RECEPTOR STATUS, UNSPECIFIED LATERALITY, UNSPECIFIED SITE OF BREAST (HCC): Primary | ICD-10-CM

## 2019-03-13 PROCEDURE — G0279 TOMOSYNTHESIS, MAMMO: HCPCS

## 2019-03-14 DIAGNOSIS — Z78.0 POST-MENOPAUSAL: ICD-10-CM

## 2019-03-14 DIAGNOSIS — Z17.0 MALIGNANT NEOPLASM OF RIGHT BREAST IN FEMALE, ESTROGEN RECEPTOR POSITIVE, UNSPECIFIED SITE OF BREAST (HCC): ICD-10-CM

## 2019-03-14 DIAGNOSIS — C50.911 MALIGNANT NEOPLASM OF RIGHT BREAST IN FEMALE, ESTROGEN RECEPTOR POSITIVE, UNSPECIFIED SITE OF BREAST (HCC): ICD-10-CM

## 2019-03-14 RX ORDER — ANASTROZOLE 1 MG/1
1 TABLET ORAL DAILY
Qty: 90 TABLET | Refills: 0 | Status: SHIPPED | OUTPATIENT
Start: 2019-03-14 | End: 2019-06-10 | Stop reason: SDUPTHER

## 2019-03-29 ENCOUNTER — TELEPHONE (OUTPATIENT)
Dept: FAMILY MEDICINE CLINIC | Age: 69
End: 2019-03-29

## 2019-03-29 DIAGNOSIS — R73.9 HYPERGLYCEMIA: ICD-10-CM

## 2019-03-29 DIAGNOSIS — R73.03 PREDIABETES: Primary | ICD-10-CM

## 2019-04-14 DIAGNOSIS — F41.9 ANXIETY: ICD-10-CM

## 2019-04-14 DIAGNOSIS — F33.0 MAJOR DEPRESSIVE DISORDER, RECURRENT EPISODE, MILD (HCC): ICD-10-CM

## 2019-04-15 RX ORDER — CITALOPRAM 20 MG/1
TABLET ORAL
Qty: 90 TABLET | Refills: 3 | Status: SHIPPED | OUTPATIENT
Start: 2019-04-15 | End: 2020-03-31

## 2019-04-23 RX ORDER — ALENDRONATE SODIUM 35 MG/1
TABLET ORAL
Qty: 24 TABLET | Refills: 0 | Status: SHIPPED | OUTPATIENT
Start: 2019-04-23 | End: 2019-07-15 | Stop reason: SDUPTHER

## 2019-04-26 ENCOUNTER — NURSE ONLY (OUTPATIENT)
Dept: LAB | Age: 69
End: 2019-04-26

## 2019-04-26 DIAGNOSIS — R73.03 PREDIABETES: ICD-10-CM

## 2019-04-26 DIAGNOSIS — R73.9 HYPERGLYCEMIA: ICD-10-CM

## 2019-04-26 LAB
AVERAGE GLUCOSE: 123 MG/DL (ref 70–126)
GLUCOSE BLD-MCNC: 131 MG/DL (ref 70–108)
HBA1C MFR BLD: 6.1 % (ref 4.4–6.4)

## 2019-04-29 ENCOUNTER — TELEPHONE (OUTPATIENT)
Dept: FAMILY MEDICINE CLINIC | Age: 69
End: 2019-04-29

## 2019-04-29 NOTE — TELEPHONE ENCOUNTER
----- Message from Shaunna Sandoval, DO sent at 4/28/2019 12:34 PM EDT -----  Please let pt know that labs are stable in the prediabetic range. Will review in detail at her f/u visit on 5/1. Let me know if questions, thanks!

## 2019-04-30 NOTE — PROGRESS NOTES
Lab Results   Component Value Date    HDL 50 10/23/2018    HDL 53 09/28/2016    HDL 53 10/12/2014     Lab Results   Component Value Date    LDLCALC 76 10/23/2018    LDLCALC 73 09/28/2016    LDLCALC 77 10/12/2014       DM Screen -   Lab Results   Component Value Date    GLUCOSE 131 04/26/2019       123 with a1c 6.4 (OCT 2018)  114 (JAN 2016) with A1C 6.0 (JAN 2016)  Glucose: 134/A1C 6.1 (SEPT 2016)    Lab Results   Component Value Date    LABA1C 6.1 04/26/2019     No results found for: EAG      Colon Cancer Screening - NEG June 2016, repeat 10 years.    Lung Cancer Screening (Age 54 to [de-identified] with 30 pack year hx, current smoker or quit within past 15 years) - never smoker    Tetanus - records pending  Influenza Vaccine - UTD FALL 2018  Pneumonia Vaccine - UTD PCV 13 OCT 2016/PPV 23 in OCT 2018  Zoser - pt to get at pharmacy ([facility] 97 2016)/same APR 2019, shingrix    Breast Cancer Screening - Birads 3 JAN 2018, repeat 6 months, oncology managing. Michael Garcia 3 July 2018 on Left with repeat 6 months, oncology managing/NEG bilat dx mammo MARCH 2019  Osteoporosis Screening - Osteopenia MAY 2014/with stable osteopniea JAN 2018 repeat 3 years    Specialist List  Ortho: John Brumfield      Current Outpatient Medications   Medication Sig Dispense Refill    alendronate (FOSAMAX) 35 MG tablet TAKE 2 TABLETS(70MG) BY MOUTH EVERY WEDNESDAY 24 tablet 0    citalopram (CELEXA) 20 MG tablet TAKE 1 TABLET BY MOUTH EVERY DAY 90 tablet 3    anastrozole (ARIMIDEX) 1 MG tablet TAKE 1 TABLET BY MOUTH DAILY 90 tablet 0    azelastine (ASTELIN) 0.1 % nasal spray 1 spray by Nasal route 2 times daily Use in each nostril as directed 1 Bottle 0    metFORMIN (GLUCOPHAGE) 500 MG tablet TAKE 1 TABLET BY MOUTH TWICE DAILY WITH MEALS 180 tablet 3    Misc Natural Products (GLUCOSAMINE CHOND DOUBLE STR) TABS Take by mouth      valsartan-hydrochlorothiazide (DIOVAN-HCT) 320-25 MG per tablet TAKE 1 TABLET BY MOUTH EVERY DAY 90 tablet 3    atorvastatin (LIPITOR) 20 MG tablet TAKE 1 TABLET BY MOUTH DAILY 90 tablet 3    busPIRone (BUSPAR) 10 MG tablet TAKE 1 TABLET BY MOUTH TWICE DAILY 180 tablet 3    vitamin D3 (COLECALCIFEROL) 400 UNITS TABS Take by mouth daily      aspirin 81 MG tablet Take 81 mg by mouth daily. No current facility-administered medications for this visit. No orders of the defined types were placed in this encounter. All medications reviewed and reconciled, including OTC and herbal medications. Updated list given to patient. Patient Active Problem List    Diagnosis Date Noted    Chronic anemia 12/05/2018    Obesity (BMI 30-39. 9)     Pure hypercholesterolemia 10/09/2016    Osteoarthritis of thoracolumbar spine      Following with Dr. Raj Simon Prediabetes     Major depression     Osteopenia      On fosamax 3 to 4 years as of JAN 2018      Encounter for monitoring aromatase inhibitor therapy 11/10/2015    OA (osteoarthritis) 10/11/2014    Essential (primary) hypertension     ALECIA (generalized anxiety disorder)     Breast cancer ([facility] [address] 04/23/2014     S/p lumpectomy. Following with oncology, Dr. Lionel Parsons. Past Medical History:   Diagnosis Date    Breast cancer ([facility] [address] 4/23/2014    Essential (primary) hypertension     ALECIA (generalized anxiety disorder)     History of hip replacement     Major depression     OA (osteoarthritis) 10/11/2014    Obesity (BMI 30-39. 9)     Osteoarthritis of thoracolumbar spine     Osteopenia     Prediabetes     Pure hypercholesterolemia 10/9/2016         Past Surgical History:   Procedure Laterality Date    BREAST SURGERY Right     lumpectomy    CHOLECYSTECTOMY  2012    COLONOSCOPY      TONSILLECTOMY         Allergies   Allergen Reactions    Codeine Nausea And Vomiting    Eszopiclone Nausea And Vomiting       Social History     Tobacco Use    Smoking status: Never Smoker    Smokeless tobacco: Never Used   Substance Use Topics    Alcohol use:  Yes Comment: little wine       Family History   Problem Relation Age of Onset   Ashley Cancer Mother         Lymphoma    Other Mother     Heart Disease Father         first bypass at age 43    Diabetes Father     Cancer Brother         Lymphoma    Colon Cancer Neg Hx     Breast Cancer Neg Hx          I have reviewed the patient's past medical history, past surgical history, allergies, medications, social and family history and I have made updates where appropriate. Review of Systems  Positive responses are highlighted in bold    Constitutional:  Fever, Chills, Night Sweats, Fatigue, Unexpected changes in weight  HENT:  Ear pain, Tinnitus, Nosebleeds, Trouble swallowing, Hearing loss, Sore throat  Cardiovascular:  Chest Pain, Palpitations, Orthopnea, Paroxysmal Nocturnal Dyspnea  Respiratory:  Cough, Wheezing, Shortness of breath, Chest tightness, Apnea  Gastrointestinal:  Nausea, Vomiting, Diarrhea, Constipation, Heartburn, Blood in stool  Genitourinary:  Difficulty or painful urination, Flank pain, Change in frequency, Urgency  Skin:  Color change, Rash, Itching, Wound  Musculoskeletal:  Joint pain, Back pain, Gait problems, Joint swelling, Myalgias  Neurological:  Dizziness, Headaches, Presyncope, Numbness, Seizures, Tremors  Endocrine:  Heat Intolerance, Cold Intolerance, Polydipsia, Polyphagia, Polyuria      PHYSICAL EXAM:  Vitals:    05/01/19 1005   BP: 139/74   Pulse: 90   Resp: 16   Temp: 97.6 °F (36.4 °C)   TempSrc: Oral   Weight: 192 lb 3.2 oz (87.2 kg)   Height: 5' 1.89\" (1.572 m)     Body mass index is 35.28 kg/m².   Pain Score:   0 - No pain    VS Reviewed  General Appearance: A&O x 3, No acute distress,well developed and well- nourished  Eyes: pupils equal, round, and reactive to light, extraocular eye movements intact, conjunctivae and eye lids without erythema  ENT: external ear and ear canal clear bilaterally, TMs intact and regular, nose without deformity, nasal mucosa and turbinates normal without

## 2019-05-01 ENCOUNTER — OFFICE VISIT (OUTPATIENT)
Dept: FAMILY MEDICINE CLINIC | Age: 69
End: 2019-05-01
Payer: MEDICARE

## 2019-05-01 VITALS
TEMPERATURE: 97.6 F | HEIGHT: 62 IN | DIASTOLIC BLOOD PRESSURE: 74 MMHG | RESPIRATION RATE: 16 BRPM | SYSTOLIC BLOOD PRESSURE: 139 MMHG | BODY MASS INDEX: 35.37 KG/M2 | HEART RATE: 90 BPM | WEIGHT: 192.2 LBS

## 2019-05-01 DIAGNOSIS — F33.42 RECURRENT MAJOR DEPRESSIVE DISORDER, IN FULL REMISSION (HCC): ICD-10-CM

## 2019-05-01 DIAGNOSIS — F41.1 GAD (GENERALIZED ANXIETY DISORDER): ICD-10-CM

## 2019-05-01 DIAGNOSIS — I10 ESSENTIAL (PRIMARY) HYPERTENSION: Primary | ICD-10-CM

## 2019-05-01 DIAGNOSIS — E66.9 OBESITY (BMI 30-39.9): ICD-10-CM

## 2019-05-01 DIAGNOSIS — R73.03 PREDIABETES: ICD-10-CM

## 2019-05-01 DIAGNOSIS — E78.00 PURE HYPERCHOLESTEROLEMIA: ICD-10-CM

## 2019-05-01 PROCEDURE — 99214 OFFICE O/P EST MOD 30 MIN: CPT | Performed by: FAMILY MEDICINE

## 2019-05-01 PROCEDURE — 3288F FALL RISK ASSESSMENT DOCD: CPT | Performed by: FAMILY MEDICINE

## 2019-05-01 NOTE — PROGRESS NOTES
Visit Information    Have you changed or started any medications since your last visit including any over-the-counter medicines, vitamins, or herbal medicines? no   Are you having any side effects from any of your medications? -  no  Have you stopped taking any of your medications? Is so, why? -  no    Have you seen any other physician or provider since your last visit? No  Have you had any other diagnostic tests since your last visit? No  Have you been seen in the emergency room and/or had an admission to a hospital since we last saw you? No  Have you had your routine dental cleaning in the past 6 months? yes     Have you activated your [facility] account? If not, what are your barriers?  No     Patient Care Team:  Low Painting DO as PCP - General (Family Medicine)  Low Painting DO as PCP - [facility] Attributed Provider    Medical History Review  Deferred to PCP    Health Maintenance   Topic Date Due    DTaP/Tdap/Td vaccine (1 - Tdap) 05/10/1967    Shingles Vaccine (1 of 2) 05/10/1998    Lipid screen  10/23/2019    Potassium monitoring  10/23/2019    Creatinine monitoring  10/23/2019    Breast cancer screen  03/13/2020    A1C test (Diabetic or Prediabetic)  04/26/2020    DEXA (modify frequency per FRAX score)  01/02/2021    Colon cancer screen colonoscopy  06/01/2026    Flu vaccine  Completed    Pneumococcal 65+ years Vaccine  Completed    Hepatitis C screen  Completed

## 2019-05-01 NOTE — PATIENT INSTRUCTIONS
irregular heartbeat.     · You have symptoms of a stroke. These may include:  ? Sudden numbness, tingling, weakness, or loss of movement in your face, arm, or leg, especially on only one side of your body. ? Sudden vision changes. ? Sudden trouble speaking. ? Sudden confusion or trouble understanding simple statements. ? Sudden problems with walking or balance. ? A sudden, severe headache that is different from past headaches.     · You have severe back or belly pain.    Do not wait until your blood pressure comes down on its own. Get help right away.   Call your doctor now or seek immediate care if:    · Your blood pressure is much higher than normal (such as 180/120 or higher), but you don't have symptoms.     · You think high blood pressure is causing symptoms, such as:  ? Severe headache.  ? Blurry vision.    Watch closely for changes in your health, and be sure to contact your doctor if:    · Your blood pressure measures higher than your doctor recommends at least 2 times. That means the top number is higher or the bottom number is higher, or both.     · You think you may be having side effects from your blood pressure medicine. Where can you learn more? Go to [email or web address]://[email or web address].[email or web address] and sign in to your Spime account. Enter A349 in the MobiTV box to learn more about \"High Blood Pressure: Care Instructions. \"     If you do not have an account, please click on the \"Sign Up Now\" link. Current as of: July 22, 2018  Content Version: 11.9  © 8155-5764 [employer]. Care instructions adapted under license by [facility] [employer] [facility] ([facility]). If you have questions about a medical condition or this instruction, always ask your healthcare professional. [place] [address] any warranty or liability for your use of this information.

## 2019-06-10 DIAGNOSIS — Z78.0 POST-MENOPAUSAL: ICD-10-CM

## 2019-06-10 DIAGNOSIS — Z17.0 MALIGNANT NEOPLASM OF RIGHT BREAST IN FEMALE, ESTROGEN RECEPTOR POSITIVE, UNSPECIFIED SITE OF BREAST (HCC): ICD-10-CM

## 2019-06-10 DIAGNOSIS — C50.911 MALIGNANT NEOPLASM OF RIGHT BREAST IN FEMALE, ESTROGEN RECEPTOR POSITIVE, UNSPECIFIED SITE OF BREAST (HCC): ICD-10-CM

## 2019-06-10 RX ORDER — ANASTROZOLE 1 MG/1
1 TABLET ORAL DAILY
Qty: 90 TABLET | Refills: 0 | Status: SHIPPED | OUTPATIENT
Start: 2019-06-10 | End: 2019-09-12 | Stop reason: SDUPTHER

## 2019-06-17 DIAGNOSIS — F41.1 GAD (GENERALIZED ANXIETY DISORDER): Chronic | ICD-10-CM

## 2019-06-17 RX ORDER — BUSPIRONE HYDROCHLORIDE 10 MG/1
TABLET ORAL
Qty: 180 TABLET | Refills: 3 | Status: SHIPPED | OUTPATIENT
Start: 2019-06-17 | End: 2020-06-16

## 2019-07-10 DIAGNOSIS — Z85.3 HISTORY OF BREAST CANCER: Primary | ICD-10-CM

## 2019-07-12 ENCOUNTER — OFFICE VISIT (OUTPATIENT)
Dept: ONCOLOGY | Age: 69
End: 2019-07-12
Payer: MEDICARE

## 2019-07-12 ENCOUNTER — HOSPITAL ENCOUNTER (OUTPATIENT)
Dept: INFUSION THERAPY | Age: 69
Discharge: HOME | End: 2019-07-12
Payer: MEDICARE

## 2019-07-12 VITALS
WEIGHT: 193.6 LBS | HEART RATE: 99 BPM | DIASTOLIC BLOOD PRESSURE: 73 MMHG | OXYGEN SATURATION: 99 % | SYSTOLIC BLOOD PRESSURE: 145 MMHG | RESPIRATION RATE: 18 BRPM | BODY MASS INDEX: 35.63 KG/M2 | TEMPERATURE: 97.8 F | HEIGHT: 62 IN

## 2019-07-12 DIAGNOSIS — C50.919 MALIGNANT NEOPLASM OF FEMALE BREAST, UNSPECIFIED ESTROGEN RECEPTOR STATUS, UNSPECIFIED LATERALITY, UNSPECIFIED SITE OF BREAST (HCC): ICD-10-CM

## 2019-07-12 DIAGNOSIS — Z51.81 ENCOUNTER FOR MONITORING AROMATASE INHIBITOR THERAPY: ICD-10-CM

## 2019-07-12 DIAGNOSIS — Z79.811 ENCOUNTER FOR MONITORING AROMATASE INHIBITOR THERAPY: ICD-10-CM

## 2019-07-12 DIAGNOSIS — C50.919 MALIGNANT NEOPLASM OF FEMALE BREAST, UNSPECIFIED ESTROGEN RECEPTOR STATUS, UNSPECIFIED LATERALITY, UNSPECIFIED SITE OF BREAST (HCC): Primary | ICD-10-CM

## 2019-07-12 DIAGNOSIS — D50.0 IRON DEFICIENCY ANEMIA DUE TO CHRONIC BLOOD LOSS: ICD-10-CM

## 2019-07-12 LAB
ALBUMIN SERPL-MCNC: 4.3 G/DL (ref 3.5–5.1)
ALP BLD-CCNC: 94 U/L (ref 38–126)
ALT SERPL-CCNC: 33 U/L (ref 11–66)
AST SERPL-CCNC: 29 U/L (ref 5–40)
BASINOPHIL, AUTOMATED: 0 % (ref 0–3)
BILIRUB SERPL-MCNC: 0.3 MG/DL (ref 0.3–1.2)
BILIRUBIN DIRECT: < 0.2 MG/DL (ref 0–0.3)
BUN, WHOLE BLOOD: 20 MG/DL (ref 8–26)
CHLORIDE, WHOLE BLOOD: 102 MEQ/L (ref 98–109)
CREATININE, WHOLE BLOOD: 0.7 MG/DL (ref 0.5–1.2)
EOSINOPHILS RELATIVE PERCENT: 3 % (ref 0–4)
GFR, ESTIMATED: 88 ML/MIN/1.73M2
GLUCOSE, WHOLE BLOOD: 206 MG/DL (ref 70–108)
HCT VFR BLD CALC: 29 % (ref 37–47)
HEMOGLOBIN: 9.8 GM/DL (ref 12–16)
IONIZED CALCIUM, WHOLE BLOOD: 1.19 MMOL/L (ref 1.12–1.32)
LYMPHOCYTES # BLD: 26 % (ref 15–47)
MCH RBC QN AUTO: 24.2 PG (ref 27–31)
MCHC RBC AUTO-ENTMCNC: 33.6 GM/DL (ref 33–37)
MCV RBC AUTO: 72 FL (ref 81–99)
MONOCYTES: 7 % (ref 0–12)
PDW BLD-RTO: 14.3 % (ref 11.5–14.5)
PLATELET # BLD: 365 THOU/MM3 (ref 130–400)
PMV BLD AUTO: 7.2 FL (ref 7.4–10.4)
POTASSIUM, WHOLE BLOOD: 3.8 MEQ/L (ref 3.5–4.9)
RBC # BLD: 4.04 MILL/MM3 (ref 4.2–5.4)
SEG NEUTROPHILS: 64 % (ref 43–75)
SODIUM, WHOLE BLOOD: 140 MEQ/L (ref 138–146)
TOTAL CO2, WHOLE BLOOD: 26 MEQ/L (ref 23–33)
TOTAL PROTEIN: 7 G/DL (ref 6.1–8)
WBC # BLD: 5.6 THOU/MM3 (ref 4.8–10.8)

## 2019-07-12 PROCEDURE — 36415 COLL VENOUS BLD VENIPUNCTURE: CPT

## 2019-07-12 PROCEDURE — 99211 OFF/OP EST MAY X REQ PHY/QHP: CPT

## 2019-07-12 PROCEDURE — 80076 HEPATIC FUNCTION PANEL: CPT

## 2019-07-12 PROCEDURE — 99215 OFFICE O/P EST HI 40 MIN: CPT | Performed by: INTERNAL MEDICINE

## 2019-07-12 PROCEDURE — 80047 BASIC METABLC PNL IONIZED CA: CPT

## 2019-07-12 PROCEDURE — 85025 COMPLETE CBC W/AUTO DIFF WBC: CPT

## 2019-07-12 NOTE — PATIENT INSTRUCTIONS
1.  Consult Dr. Manoj Dietz at [facility] [place] for diagnostic colonoscopy for newly found iron deficiency anemia. 2.  RTC to see me after the colonoscopy has been completed. 3.  Labs on RTC.

## 2019-07-15 DIAGNOSIS — M85.89 OSTEOPENIA OF MULTIPLE SITES: Primary | ICD-10-CM

## 2019-07-15 DIAGNOSIS — Z85.3 HISTORY OF BREAST CANCER: ICD-10-CM

## 2019-07-15 RX ORDER — ALENDRONATE SODIUM 70 MG/1
70 TABLET ORAL
Qty: 12 TABLET | Refills: 1 | Status: SHIPPED | OUTPATIENT
Start: 2019-07-15 | End: 2020-01-14 | Stop reason: SDUPTHER

## 2019-07-17 PROBLEM — D50.0 IRON DEFICIENCY ANEMIA DUE TO CHRONIC BLOOD LOSS: Status: ACTIVE | Noted: 2019-07-17

## 2019-07-21 DIAGNOSIS — E78.00 PURE HYPERCHOLESTEROLEMIA: Chronic | ICD-10-CM

## 2019-07-22 RX ORDER — ATORVASTATIN CALCIUM 20 MG/1
TABLET, FILM COATED ORAL
Qty: 90 TABLET | Refills: 3 | Status: SHIPPED | OUTPATIENT
Start: 2019-07-22 | End: 2020-07-30

## 2019-09-12 DIAGNOSIS — Z17.0 MALIGNANT NEOPLASM OF RIGHT BREAST IN FEMALE, ESTROGEN RECEPTOR POSITIVE, UNSPECIFIED SITE OF BREAST (HCC): ICD-10-CM

## 2019-09-12 DIAGNOSIS — C50.911 MALIGNANT NEOPLASM OF RIGHT BREAST IN FEMALE, ESTROGEN RECEPTOR POSITIVE, UNSPECIFIED SITE OF BREAST (HCC): ICD-10-CM

## 2019-09-12 DIAGNOSIS — Z78.0 POST-MENOPAUSAL: ICD-10-CM

## 2019-09-12 RX ORDER — ANASTROZOLE 1 MG/1
1 TABLET ORAL DAILY
Qty: 90 TABLET | Refills: 0 | Status: SHIPPED | OUTPATIENT
Start: 2019-09-12 | End: 2019-12-09 | Stop reason: SDUPTHER

## 2019-09-16 DIAGNOSIS — I10 ESSENTIAL (PRIMARY) HYPERTENSION: Chronic | ICD-10-CM

## 2019-09-16 RX ORDER — VALSARTAN AND HYDROCHLOROTHIAZIDE 320; 25 MG/1; MG/1
TABLET, FILM COATED ORAL
Qty: 90 TABLET | Refills: 3 | Status: SHIPPED | OUTPATIENT
Start: 2019-09-16 | End: 2020-06-17 | Stop reason: RX

## 2019-09-19 ENCOUNTER — HOSPITAL ENCOUNTER (OUTPATIENT)
Dept: CT IMAGING | Age: 69
Discharge: HOME | End: 2019-09-19
Payer: MEDICARE

## 2019-09-19 DIAGNOSIS — D50.0 IRON DEFICIENCY ANEMIA SECONDARY TO BLOOD LOSS (CHRONIC): ICD-10-CM

## 2019-09-19 LAB — POC CREATININE WHOLE BLOOD: 0.6 MG/DL (ref 0.5–1.2)

## 2019-09-19 PROCEDURE — 6360000004 HC RX CONTRAST MEDICATION: Performed by: INTERNAL MEDICINE

## 2019-09-19 PROCEDURE — 74177 CT ABD & PELVIS W/CONTRAST: CPT

## 2019-09-19 PROCEDURE — 2500000003 HC RX 250 WO HCPCS: Performed by: INTERNAL MEDICINE

## 2019-09-19 PROCEDURE — 82565 ASSAY OF CREATININE: CPT

## 2019-09-19 RX ADMIN — BARIUM SULFATE 1500 ML: 1 SUSPENSION ORAL at 12:37

## 2019-09-19 RX ADMIN — IOPAMIDOL 100 ML: 755 INJECTION, SOLUTION INTRAVENOUS at 12:37

## 2019-10-04 ENCOUNTER — TELEPHONE (OUTPATIENT)
Dept: FAMILY MEDICINE CLINIC | Age: 69
End: 2019-10-04

## 2019-10-04 DIAGNOSIS — R73.03 PREDIABETES: ICD-10-CM

## 2019-10-04 DIAGNOSIS — R73.9 HYPERGLYCEMIA: ICD-10-CM

## 2019-10-04 DIAGNOSIS — I10 ESSENTIAL (PRIMARY) HYPERTENSION: Primary | ICD-10-CM

## 2019-11-07 LAB
ABSOLUTE BASO #: 0.1 X10E9/L (ref 0–0.9)
ABSOLUTE EOS #: 0.1 X10E9/L (ref 0–0.4)
ABSOLUTE LYMPH #: 1.8 X10E9/L (ref 1–3.5)
ABSOLUTE MONO #: 0.5 X10E9/L (ref 0–0.9)
ABSOLUTE NEUT #: 3.7 X10E9/L (ref 1.5–6.6)
ALBUMIN SERPL-MCNC: 4.1 G/DL (ref 3.2–5.3)
ALK PHOSPHATASE: 105 U/L (ref 39–130)
ALT SERPL-CCNC: 26 U/L (ref 0–40)
ANION GAP SERPL CALCULATED.3IONS-SCNC: 10 MMOL/L (ref 4–12)
AST SERPL-CCNC: 21 U/L (ref 0–41)
AVERAGE GLUCOSE: 134 MG/DL
BASOPHILS RELATIVE PERCENT: 0.9 %
BILIRUB SERPL-MCNC: 0.5 MG/DL (ref 0.3–1.2)
BUN BLDV-MCNC: 14 MG/DL (ref 5–27)
CALCIUM SERPL-MCNC: 9.6 MG/DL (ref 8.5–10.5)
CHLORIDE BLD-SCNC: 103 MMOL/L (ref 98–109)
CHOLESTEROL/HDL RATIO: 2.9 (ref 1–5)
CHOLESTEROL: 159 MG/DL (ref 150–200)
CO2: 28 MMOL/L (ref 22–32)
CREAT SERPL-MCNC: 0.66 MG/DL (ref 0.6–1.3)
CREATINE, URINE: 235.43 MG/DL
EGFR AFRICAN AMERICAN: >60 ML/MIN/1.73SQ.M
EGFR IF NONAFRICAN AMERICAN: >60 ML/MIN/1.73SQ.M
EOSINOPHILS RELATIVE PERCENT: 1.6 %
GLUCOSE: 148 MG/DL (ref 65–99)
HBA1C MFR BLD: 6.3 % (ref 4.4–6.4)
HCT VFR BLD CALC: 30.9 % (ref 37–51)
HDLC SERPL-MCNC: 54 MG/DL
HEMOGLOBIN: 9.7 G/DL (ref 12.6–17.4)
LDL CHOLESTEROL CALCULATED: 79 MG/DL
LDL/HDL RATIO: 1.5
LYMPHOCYTE %: 30 %
MCH RBC QN AUTO: 22.4 PG (ref 27–35)
MCHC RBC AUTO-ENTMCNC: 31.5 G/DL (ref 31–36)
MCV RBC AUTO: 71 FL (ref 81–101)
MICROALBUMIN/CREAT 24H UR: 2.4 MG/DL (ref 0–1.9)
MICROALBUMIN/CREAT UR-RTO: 10.2 MG/G CREAT (ref 0–30)
MONOCYTES # BLD: 7.8 %
NEUTROPHILS RELATIVE PERCENT: 59.7 %
PDW BLD-RTO: 18.7 % (ref 11.4–14.3)
PLATELETS: 362 X10E9/L (ref 150–450)
PMV BLD AUTO: 9.1 FL (ref 7–12)
POTASSIUM SERPL-SCNC: 4.4 MMOL/L (ref 3.5–5)
RBC: 4.34 X10E12/L (ref 3.9–5.8)
SODIUM BLD-SCNC: 141 MMOL/L (ref 134–146)
TOTAL PROTEIN: 6.8 G/DL (ref 6–8)
TRIGL SERPL-MCNC: 129 MG/DL (ref 27–150)
TSH SERPL DL<=0.05 MIU/L-ACNC: 3.1 UIU/ML (ref 0.49–4.67)
VLDLC SERPL CALC-MCNC: 26 MG/DL (ref 0–30)
WBC: 6.1 X10E9/L (ref 4.4–12)

## 2019-11-08 ENCOUNTER — TELEPHONE (OUTPATIENT)
Dept: FAMILY MEDICINE CLINIC | Age: 69
End: 2019-11-08

## 2019-12-09 DIAGNOSIS — C50.911 MALIGNANT NEOPLASM OF RIGHT BREAST IN FEMALE, ESTROGEN RECEPTOR POSITIVE, UNSPECIFIED SITE OF BREAST (HCC): ICD-10-CM

## 2019-12-09 DIAGNOSIS — Z78.0 POST-MENOPAUSAL: ICD-10-CM

## 2019-12-09 DIAGNOSIS — Z17.0 MALIGNANT NEOPLASM OF RIGHT BREAST IN FEMALE, ESTROGEN RECEPTOR POSITIVE, UNSPECIFIED SITE OF BREAST (HCC): ICD-10-CM

## 2019-12-09 RX ORDER — ANASTROZOLE 1 MG/1
1 TABLET ORAL DAILY
Qty: 90 TABLET | Refills: 0 | Status: SHIPPED | OUTPATIENT
Start: 2019-12-09 | End: 2020-03-05

## 2019-12-29 DIAGNOSIS — E66.9 OBESITY (BMI 30-39.9): ICD-10-CM

## 2019-12-29 DIAGNOSIS — R73.03 PREDIABETES: Chronic | ICD-10-CM

## 2020-01-13 NOTE — PROGRESS NOTES
Chief Complaint   Patient presents with    6 Month Follow-Up     chronic issues as noted below       History obtained from the patient. SUBJECTIVE:  Mayra Starkey is a 68 y.o. female that presents today for       -HTN:    HPI:     Taking meds as prescribed ?: yes  Tolerating well ?: yes  Side Effects ?: denies  BP at home ?: <140/90  Working on TLCS ?: yes  Chest Pain/SOB/Palpitations? denies    BP Readings from Last 3 Encounters:   01/14/20 128/70   07/12/19 (!) 145/73   05/01/19 139/74         -PreDM PRIOR VISIT: wts going up d/t estrogen blocker. Due for labs. Trying to watch diet. UPDATE PRIOR VISIT: wts down a few labs. Had labs done, those are pending. UPDATE PRIOR VISIT: wts up a bit. Higher d/t estrogen blockers. Trying to make diet changes. a1c stable. UPDATE PRIOR VISIT: wts stable. Labs a little worse. On meformin. Working on life style changes. UPDATE LAST VISIT: wts improved. Labs stable. On metformin. Working on life style changes. UPDATE TODAY:   Labs stable  wts about the same  On metfromin  Working on life style changes     Lab Results   Component Value Date    LABA1C 6.3 11/06/2019    LABA1C 6.1 04/26/2019    LABA1C 6.4 10/23/2018    LABA1C 6.3 04/24/2018    LABA1C 6.1 04/27/2017    LABA1C 6.1 09/28/2016     Wt Readings from Last 3 Encounters:   01/14/20 189 lb (85.7 kg)   07/12/19 193 lb 9.6 oz (87.8 kg)   05/01/19 192 lb 3.2 oz (87.2 kg)       -HLD:    HPI:    Taking meds as prescribed ?: yes  Tolerating well ?: yes  Side Effects ?: denies  Muscle Pain?: denies  Working on TLCS ?: yes      -Depression/Anxiety: moods stable on celexa and buspar. Denies SI/HI. meds working well.  Would like to continue.       -Osteopenia:  Is on alendronate, but stopped d/t GI irritation   Also on arimidex for breast CA  No falls or fxrs    -TIFFANY:  On iron   Saw GI, neg w/u  Saw heme, neg w/u  Due for f/u labs, has order  No bleeding, melena or hematochezia      -Hx of Breast CA:  Under went R lumpectomy 2014  Seeing Dr. Lainey Farrell  UTD on imaging  Needs to make f/u apt      -Swelling L heal  Has some swelling posterior L heal  Mild pain  Stable  Firm  Not sure what it is  No injury      Age/Gender Health Maintenance    Lipid - ; LDL 76; HDL 50;  (OCT 2018)  TC  177;  LDL 86; HDL 60;  (OCT 2017)  ; LDL 91; HDL 60;  (JAN 2016)    Lab Results   Component Value Date    CHOL 159 11/06/2019    CHOL 155 10/23/2018    CHOL 154 09/28/2016     Lab Results   Component Value Date    TRIG 129 11/06/2019    TRIG 144 10/23/2018    TRIG 139 09/28/2016     Lab Results   Component Value Date    HDL 54 11/06/2019    HDL 50 10/23/2018    HDL 53 09/28/2016     Lab Results   Component Value Date    LDLCALC 79 11/06/2019    LDLCALC 76 10/23/2018    LDLCALC 73 09/28/2016       DM Screen -   Lab Results   Component Value Date    GLUCOSE 148 11/06/2019       123 with a1c 6.4 (OCT 2018)  114 (JAN 2016) with A1C 6.0 (JAN 2016)  Glucose: 134/A1C 6.1 (SEPT 2016)    Lab Results   Component Value Date    LABA1C 6.3 11/06/2019     No results found for: EAG      Colon Cancer Screening - NEG June 2016, repeat 10 years/ NEG SEPT 2019 with hyperplastic polyp SEPT 2019, repeat 10 years (Dr. Alina Ross)   Iain Thomas (Age 52 to [de-identified] with 30 pack year hx, current smoker or quit within past 15 years) - never smoker    Tetanus - to get at pharmacy per medicare rules  Influenza Vaccine - UTD FALL 2019  Pneumonia Vaccine - UTD PCV 13 OCT 2016/PPV 23 in OCT 2018  Zoster - pt to get at pharmacy (Christie 97 2016)/to get at pharmacy per medicare rules    Breast Cancer Screening - Birads 3 JAN 2018, repeat 6 months, oncology managing. Eladia Shea 3 July 2018 on Left with repeat 6 months, oncology managing/NEG bilat dx mammo MARCH 2019  Osteoporosis Screening - Osteopenia MAY 2014/with stable osteopniea JAN 2018 repeat 3 years    Specialist List  Ortho: Keith Canada      Current Outpatient Medications   Medication Sig Dispense Refill    NONFORMULARY Take 150 mg by mouth Indications: Iron pills  ordered by GI      alendronate (FOSAMAX) 70 MG tablet Take 1 tablet by mouth every 7 days 12 tablet 3    metFORMIN (GLUCOPHAGE) 500 MG tablet TAKE 1 TABLET BY MOUTH TWICE DAILY WITH MEALS 180 tablet 3    anastrozole (ARIMIDEX) 1 MG tablet TAKE 1 TABLET BY MOUTH DAILY 90 tablet 0    valsartan-hydrochlorothiazide (DIOVAN-HCT) 320-25 MG per tablet TAKE 1 TABLET BY MOUTH EVERY DAY 90 tablet 3    atorvastatin (LIPITOR) 20 MG tablet TAKE 1 TABLET BY MOUTH DAILY 90 tablet 3    busPIRone (BUSPAR) 10 MG tablet TAKE 1 TABLET BY MOUTH TWICE DAILY 180 tablet 3    citalopram (CELEXA) 20 MG tablet TAKE 1 TABLET BY MOUTH EVERY DAY 90 tablet 3    Misc Natural Products (GLUCOSAMINE CHOND DOUBLE STR) TABS Take by mouth      vitamin D3 (COLECALCIFEROL) 400 UNITS TABS Take by mouth daily      aspirin 81 MG tablet Take 81 mg by mouth daily. No current facility-administered medications for this visit. Orders Placed This Encounter   Medications    alendronate (FOSAMAX) 70 MG tablet     Sig: Take 1 tablet by mouth every 7 days     Dispense:  12 tablet     Refill:  3         All medications reviewed and reconciled, including OTC and herbal medications. Updated list given to patient. Patient Active Problem List    Diagnosis Date Noted    Iron deficiency anemia due to chronic blood loss 07/17/2019    Chronic anemia 12/05/2018    [facility]     Pure hypercholesterolemia 10/09/2016    Osteoarthritis of thoracolumbar spine      Following with Dr. Trang Ayers depression     Osteopenia     Encounter for monitoring aromatase inhibitor therapy 11/10/2015    OA (osteoarthritis) 10/11/2014    Essential (primary) hypertension     ALECIA (generalized anxiety disorder)     Breast cancer ([facility] 04/23/2014     S/p lumpectomy. Following with oncology, Dr. Laina Fuller.            Past Medical History:   Diagnosis Date    Breast cancer ([facility] 4/23/2014    Essential (primary) hypertension     ALECIA (generalized anxiety disorder)     History of hip replacement     Major depression     Morbid obesity ([facility]     OA (osteoarthritis) 10/11/2014    Osteoarthritis of thoracolumbar spine     Osteopenia     Prediabetes     Pure hypercholesterolemia 10/9/2016         Past Surgical History:   Procedure Laterality Date    BREAST SURGERY Right     lumpectomy    CHOLECYSTECTOMY  2012    COLONOSCOPY      TONSILLECTOMY         Allergies   Allergen Reactions    Codeine Nausea And Vomiting    Eszopiclone Nausea And Vomiting       Social History     Tobacco Use    Smoking status: Never Smoker    Smokeless tobacco: Never Used   Substance Use Topics    Alcohol use: Yes     Comment: little wine       Family History   Problem Relation Age of Onset    Cancer Mother         Lymphoma    Other Mother     Heart Disease Father         first bypass at age 43    Diabetes Father     Cancer Brother         Lymphoma    Colon Cancer Neg Hx     Breast Cancer Neg Hx          I have reviewed the patient's past medical history, past surgical history, allergies, medications, social and family history and I have made updates where appropriate.       Review of Systems  Positive responses are highlighted in bold    Constitutional:  Fever, Chills, Night Sweats, Fatigue, Unexpected changes in weight  HENT:  Ear pain, Tinnitus, Nosebleeds, Trouble swallowing, Hearing loss, Sore throat  Cardiovascular:  Chest Pain, Palpitations, Orthopnea, Paroxysmal Nocturnal Dyspnea  Respiratory:  Cough, Wheezing, Shortness of breath, Chest tightness, Apnea  Gastrointestinal:  Nausea, Vomiting, Diarrhea, Constipation, Heartburn, Blood in stool  Genitourinary:  Difficulty or painful urination, Flank pain, Change in frequency, Urgency  Skin:  Color change, Rash, Itching, Wound  Musculoskeletal:  Joint pain, Back pain, Gait problems, Joint swelling, Prediabetes    Stable  con't metformin  F/u 6 months, will call to get labs done prior to apt    3. Morbid obesity ([facility]    Discussed wt loss strategies    4. Pure hypercholesterolemia    Stable  con't lipitor  Labs UTD    5. Recurrent major depressive disorder, in full remission ([facility]    Stable  Doing well  con't celexa and buspar    6. ALECIA (generalized anxiety disorder)    As above    7. Osteopenia of multiple sites    con't kalee with D and fosamax    - alendronate (FOSAMAX) 70 MG tablet; Take 1 tablet by mouth every 7 days  Dispense: 12 tablet; Refill: 3    8. Iron deficiency anemia due to chronic blood loss    Con't iron  Neg w/u with GI and heme  F/u as planned  Has f/u labs from heme    9. Malignant neoplasm of female breast, unspecified estrogen receptor status, unspecified laterality, unspecified site of breast ([facility]    In remission  F/u onc    10. History of breast cancer    - alendronate (FOSAMAX) 70 MG tablet; Take 1 tablet by mouth every 7 days  Dispense: 12 tablet; Refill: 3    11. Pain of left heel    Unclear etiology  Start with xray  If neg, consider podiatry    - XR FOOT LEFT (MIN 3 VIEWS); Future      DISPOSITION    Return in about 6 months (around 7/14/2020) for follow-up on chronic medical conditions, sooner as needed. Michaelsergei Turpin released without restrictions. Future Appointments   Date Time Provider Ramses Melissa   7/14/2020  9:20 AM [id number] [place] received counseling on the following healthy behaviors: nutrition, exercise and medication adherence    Patient given educational materials on: See Attached    I have instructed Michael Turpin to complete a self tracking handout on Blood Pressures  and instructed them to bring it with them to her next appointment. Barriers to learning and self management: none    Discussed use, benefit, and side effects of prescribed medications. Barriers to medication compliance addressed.   All patient questions answered. Pt voiced understanding.        Electronically signed by Robby Salinas DO on 1/14/2020 at 9:34 AM

## 2020-01-14 ENCOUNTER — OFFICE VISIT (OUTPATIENT)
Dept: FAMILY MEDICINE CLINIC | Age: 70
End: 2020-01-14
Payer: MEDICARE

## 2020-01-14 VITALS
HEART RATE: 88 BPM | BODY MASS INDEX: 33.49 KG/M2 | SYSTOLIC BLOOD PRESSURE: 128 MMHG | HEIGHT: 63 IN | WEIGHT: 189 LBS | RESPIRATION RATE: 10 BRPM | DIASTOLIC BLOOD PRESSURE: 70 MMHG | TEMPERATURE: 98.3 F

## 2020-01-14 PROCEDURE — 99214 OFFICE O/P EST MOD 30 MIN: CPT | Performed by: FAMILY MEDICINE

## 2020-01-14 RX ORDER — ALENDRONATE SODIUM 70 MG/1
70 TABLET ORAL
Qty: 12 TABLET | Refills: 3 | Status: SHIPPED | OUTPATIENT
Start: 2020-01-14 | End: 2021-01-25

## 2020-01-14 NOTE — PATIENT INSTRUCTIONS
ibuprofen. Some of these medicines can raise blood pressure. · Learn how to check your blood pressure at home. Lifestyle changes  · Stay at a healthy weight. This is especially important if you put on weight around the waist. Losing even 10 pounds can help you lower your blood pressure. · If your doctor recommends it, get more exercise. Walking is a good choice. Bit by bit, increase the amount you walk every day. Try for at least 30 minutes on most days of the week. You also may want to swim, bike, or do other activities. · Avoid or limit alcohol. Talk to your doctor about whether you can drink any alcohol. · Try to limit how much sodium you eat to less than 2,300 milligrams (mg) a day. Your doctor may ask you to try to eat less than 1,500 mg a day. · Eat plenty of fruits (such as bananas and oranges), vegetables, legumes, whole grains, and low-fat dairy products. · Lower the amount of saturated fat in your diet. Saturated fat is found in animal products such as milk, cheese, and meat. Limiting these foods may help you lose weight and also lower your risk for heart disease. · Do not smoke. Smoking increases your risk for heart attack and stroke. If you need help quitting, talk to your doctor about stop-smoking programs and medicines. These can increase your chances of quitting for good. When should you call for help? Call  911 anytime you think you may need emergency care. This may mean having symptoms that suggest that your blood pressure is causing a serious heart or blood vessel problem. Your blood pressure may be over 180/120.   For example, call  911 if:    · You have symptoms of a heart attack. These may include:  ? Chest pain or pressure, or a strange feeling in the chest.  ? Sweating. ? Shortness of breath. ? Nausea or vomiting. ? Pain, pressure, or a strange feeling in the back, neck, jaw, or upper belly or in one or both shoulders or arms. ? Lightheadedness or sudden weakness.   ? A fast or irregular heartbeat.     · You have symptoms of a stroke. These may include:  ? Sudden numbness, tingling, weakness, or loss of movement in your face, arm, or leg, especially on only one side of your body. ? Sudden vision changes. ? Sudden trouble speaking. ? Sudden confusion or trouble understanding simple statements. ? Sudden problems with walking or balance. ? A sudden, severe headache that is different from past headaches.     · You have severe back or belly pain.    Do not wait until your blood pressure comes down on its own. Get help right away.   Call your doctor now or seek immediate care if:    · Your blood pressure is much higher than normal (such as 180/120 or higher), but you don't have symptoms.     · You think high blood pressure is causing symptoms, such as:  ? Severe headache.  ? Blurry vision.    Watch closely for changes in your health, and be sure to contact your doctor if:    · Your blood pressure measures higher than your doctor recommends at least 2 times. That means the top number is higher or the bottom number is higher, or both.     · You think you may be having side effects from your blood pressure medicine. Where can you learn more? Go to https://[employer]web[employer]. org and sign in to your [employer] account. Enter F713 in the "[employer] box to learn more about \"High Blood Pressure: Care Instructions. \"     If you do not have an account, please click on the \"Sign Up Now\" link. Current as of: April 9, 2019  Content Version: 12.3  © 3061-4643 [employer], [employer]. Care instructions adapted under license by [facility] [employer] [facility] ([facility]). If you have questions about a medical condition or this instruction, always ask your healthcare professional. [place] [address] any warranty or liability for your use of this information.

## 2020-02-05 ENCOUNTER — HOSPITAL ENCOUNTER (OUTPATIENT)
Age: 70
Discharge: HOME | End: 2020-02-05
Payer: MEDICARE

## 2020-02-05 ENCOUNTER — APPOINTMENT (OUTPATIENT)
Dept: INFUSION THERAPY | Age: 70
End: 2020-02-05
Payer: MEDICARE

## 2020-02-05 DIAGNOSIS — D50.0 IRON DEFICIENCY ANEMIA DUE TO CHRONIC BLOOD LOSS: ICD-10-CM

## 2020-02-05 DIAGNOSIS — C50.919 MALIGNANT NEOPLASM OF FEMALE BREAST, UNSPECIFIED ESTROGEN RECEPTOR STATUS, UNSPECIFIED LATERALITY, UNSPECIFIED SITE OF BREAST (HCC): ICD-10-CM

## 2020-02-05 LAB
BASOPHILS # BLD: 0.7 %
BASOPHILS ABSOLUTE: 0 THOU/MM3 (ref 0–0.1)
EOSINOPHIL # BLD: 2.3 %
EOSINOPHILS ABSOLUTE: 0.2 THOU/MM3 (ref 0–0.4)
ERYTHROCYTE [DISTWIDTH] IN BLOOD BY AUTOMATED COUNT: 20 % (ref 11.5–14.5)
ERYTHROCYTE [DISTWIDTH] IN BLOOD BY AUTOMATED COUNT: 55.3 FL (ref 35–45)
FERRITIN: 11 NG/ML (ref 10–291)
HCT VFR BLD CALC: 35.8 % (ref 37–47)
HEMOGLOBIN: 10.4 GM/DL (ref 12–16)
IMMATURE GRANS (ABS): 0.03 THOU/MM3 (ref 0–0.07)
IMMATURE GRANULOCYTES: 0.4 %
LYMPHOCYTES # BLD: 27.4 %
LYMPHOCYTES ABSOLUTE: 1.9 THOU/MM3 (ref 1–4.8)
MCH RBC QN AUTO: 22.5 PG (ref 26–33)
MCHC RBC AUTO-ENTMCNC: 29.1 GM/DL (ref 32.2–35.5)
MCV RBC AUTO: 77.5 FL (ref 81–99)
MONOCYTES # BLD: 8.5 %
MONOCYTES ABSOLUTE: 0.6 THOU/MM3 (ref 0.4–1.3)
NUCLEATED RED BLOOD CELLS: 0 /100 WBC
PLATELET # BLD: 388 THOU/MM3 (ref 130–400)
PMV BLD AUTO: 10 FL (ref 9.4–12.4)
RBC # BLD: 4.62 MILL/MM3 (ref 4.2–5.4)
SEG NEUTROPHILS: 60.7 %
SEGMENTED NEUTROPHILS ABSOLUTE COUNT: 4.2 THOU/MM3 (ref 1.8–7.7)
WBC # BLD: 7 THOU/MM3 (ref 4.8–10.8)

## 2020-02-05 PROCEDURE — 85025 COMPLETE CBC W/AUTO DIFF WBC: CPT

## 2020-02-05 PROCEDURE — 36415 COLL VENOUS BLD VENIPUNCTURE: CPT

## 2020-02-05 PROCEDURE — 82728 ASSAY OF FERRITIN: CPT

## 2020-02-12 ENCOUNTER — HOSPITAL ENCOUNTER (OUTPATIENT)
Dept: GENERAL RADIOLOGY | Age: 70
Discharge: HOME | End: 2020-02-12
Payer: MEDICARE

## 2020-02-12 ENCOUNTER — HOSPITAL ENCOUNTER (OUTPATIENT)
Age: 70
Discharge: HOME | End: 2020-02-12
Payer: MEDICARE

## 2020-02-12 PROCEDURE — 73630 X-RAY EXAM OF FOOT: CPT

## 2020-02-13 ENCOUNTER — TELEPHONE (OUTPATIENT)
Dept: FAMILY MEDICINE CLINIC | Age: 70
End: 2020-02-13

## 2020-02-13 NOTE — TELEPHONE ENCOUNTER
----- Message from José Manuel Ariza DO sent at 2/13/2020  5:37 AM EST -----  Please let pt know that xray shows arthritis as well as a heel spur. I think all likely contributing to her pain. Would recommend we set her up with a podiatrist, I've placed the referral.   Let me know if questions, thanks!

## 2020-02-21 RX ORDER — METHYLPREDNISOLONE SODIUM SUCCINATE 125 MG/2ML
125 INJECTION, POWDER, LYOPHILIZED, FOR SOLUTION INTRAMUSCULAR; INTRAVENOUS ONCE
Status: CANCELLED | OUTPATIENT
Start: 2020-02-21

## 2020-02-21 RX ORDER — SODIUM CHLORIDE 9 MG/ML
INJECTION, SOLUTION INTRAVENOUS CONTINUOUS
Status: CANCELLED | OUTPATIENT
Start: 2020-02-21

## 2020-02-21 RX ORDER — DIPHENHYDRAMINE HYDROCHLORIDE 50 MG/ML
50 INJECTION INTRAMUSCULAR; INTRAVENOUS ONCE
Status: CANCELLED | OUTPATIENT
Start: 2020-02-21

## 2020-02-21 RX ORDER — HEPARIN SODIUM (PORCINE) LOCK FLUSH IV SOLN 100 UNIT/ML 100 UNIT/ML
500 SOLUTION INTRAVENOUS PRN
Status: CANCELLED | OUTPATIENT
Start: 2020-02-21

## 2020-02-21 RX ORDER — SODIUM CHLORIDE 0.9 % (FLUSH) 0.9 %
5 SYRINGE (ML) INJECTION PRN
Status: CANCELLED | OUTPATIENT
Start: 2020-02-21

## 2020-02-21 RX ORDER — SODIUM CHLORIDE 0.9 % (FLUSH) 0.9 %
10 SYRINGE (ML) INJECTION PRN
Status: CANCELLED | OUTPATIENT
Start: 2020-02-21

## 2020-03-03 ENCOUNTER — HOSPITAL ENCOUNTER (OUTPATIENT)
Dept: NURSING | Age: 70
Discharge: HOME | End: 2020-03-03
Payer: MEDICARE

## 2020-03-03 VITALS
RESPIRATION RATE: 16 BRPM | TEMPERATURE: 98.2 F | SYSTOLIC BLOOD PRESSURE: 127 MMHG | DIASTOLIC BLOOD PRESSURE: 72 MMHG | HEART RATE: 83 BPM

## 2020-03-03 DIAGNOSIS — D50.0 IRON DEFICIENCY ANEMIA DUE TO CHRONIC BLOOD LOSS: Primary | ICD-10-CM

## 2020-03-03 PROCEDURE — 96365 THER/PROPH/DIAG IV INF INIT: CPT

## 2020-03-03 PROCEDURE — 6360000002 HC RX W HCPCS: Performed by: INTERNAL MEDICINE

## 2020-03-03 PROCEDURE — 2580000003 HC RX 258: Performed by: INTERNAL MEDICINE

## 2020-03-03 RX ORDER — SODIUM CHLORIDE 9 MG/ML
INJECTION, SOLUTION INTRAVENOUS CONTINUOUS
Status: ACTIVE | OUTPATIENT
Start: 2020-03-03 | End: 2020-03-03

## 2020-03-03 RX ORDER — METHYLPREDNISOLONE SODIUM SUCCINATE 125 MG/2ML
125 INJECTION, POWDER, LYOPHILIZED, FOR SOLUTION INTRAMUSCULAR; INTRAVENOUS ONCE
Status: CANCELLED | OUTPATIENT
Start: 2020-03-10

## 2020-03-03 RX ORDER — HEPARIN SODIUM (PORCINE) LOCK FLUSH IV SOLN 100 UNIT/ML 100 UNIT/ML
500 SOLUTION INTRAVENOUS PRN
Status: CANCELLED | OUTPATIENT
Start: 2020-03-10

## 2020-03-03 RX ORDER — SODIUM CHLORIDE 9 MG/ML
INJECTION, SOLUTION INTRAVENOUS CONTINUOUS
Status: CANCELLED | OUTPATIENT
Start: 2020-03-10

## 2020-03-03 RX ORDER — SODIUM CHLORIDE 0.9 % (FLUSH) 0.9 %
10 SYRINGE (ML) INJECTION PRN
Status: DISCONTINUED | OUTPATIENT
Start: 2020-03-03 | End: 2020-03-04 | Stop reason: HOSPADM

## 2020-03-03 RX ORDER — SODIUM CHLORIDE 0.9 % (FLUSH) 0.9 %
10 SYRINGE (ML) INJECTION PRN
Status: CANCELLED | OUTPATIENT
Start: 2020-03-10

## 2020-03-03 RX ORDER — DIPHENHYDRAMINE HYDROCHLORIDE 50 MG/ML
50 INJECTION INTRAMUSCULAR; INTRAVENOUS ONCE
Status: CANCELLED | OUTPATIENT
Start: 2020-03-10

## 2020-03-03 RX ORDER — SODIUM CHLORIDE 0.9 % (FLUSH) 0.9 %
5 SYRINGE (ML) INJECTION PRN
Status: CANCELLED | OUTPATIENT
Start: 2020-03-10

## 2020-03-03 RX ADMIN — SODIUM CHLORIDE: 9 INJECTION, SOLUTION INTRAVENOUS at 11:10

## 2020-03-03 RX ADMIN — Medication 10 ML: at 11:10

## 2020-03-03 RX ADMIN — FERRIC CARBOXYMALTOSE INJECTION 750 MG: 50 INJECTION, SOLUTION INTRAVENOUS at 11:10

## 2020-03-03 NOTE — PROGRESS NOTES
1200:  Tolerated infusion well  1215:  Pt discharged ambulatory with instructions.     __m__ Safety:       (Environmental)   [place] to environment   Ensure ID band is correct and in place/ allergy band as needed   Assess for fall risk   Initiate fall precautions as applicable (fall band, side rails, etc.)   Call light within reach   Bed in low position/ wheels locked    m____ Pain:        Assess pain level and characteristics   Administer analgesics as ordered   Assess effectiveness of pain management and report to MD as needed    m____ Knowledge Deficit:   Assess baseline knowledge   Provide teaching at level of understanding   Provide teaching via preferred learning method   Evaluate teaching effectiveness    _m___ Hemodynamic/Respiratory Status:       (Pre and Post Procedure Monitoring)   Assess/Monitor vital signs and LOC   Assess Baseline SpO2 prior to any sedation   Obtain weight/height   Assess vital signs/ LOC until patient meets discharge criteria   Monitor procedure site and notify MD of any issues

## 2020-03-03 NOTE — PROGRESS NOTES
1100 pt arrives to OPN for Injectofer infusion. All questions and concerns addressed  1104 PATIENT RIGHTS AND RESPONSIBILITIES SHEET OFFERED TO PT TO READ. 1115 call light in reach, side rail up x1.  Refreshments offered  1125 _m___ Safety:       (Environmental)   [place] to environment   Ensure ID band is correct and in place/ allergy band as needed   Assess for fall risk   Initiate fall precautions as applicable (fall band, side rails, etc.)   Call light within reach   Bed in low position/ wheels locked    __m__ Pain:        Assess pain level and characteristics   Administer analgesics as ordered   Assess effectiveness of pain management and report to MD as needed    __m__ Knowledge Deficit:   Assess baseline knowledge   Provide teaching at level of understanding   Provide teaching via preferred learning method   Evaluate teaching effectiveness    __m__ Hemodynamic/Respiratory Status:       (Pre and Post Procedure Monitoring)   Assess/Monitor vital signs and LOC   Assess Baseline SpO2 prior to any sedation   Obtain weight/height   Assess vital signs/ LOC until patient meets discharge criteria   Monitor procedure site and notify MD of any issues  

## 2020-03-05 RX ORDER — ANASTROZOLE 1 MG/1
1 TABLET ORAL DAILY
Qty: 90 TABLET | Refills: 0 | Status: SHIPPED | OUTPATIENT
Start: 2020-03-05 | End: 2021-04-29 | Stop reason: ALTCHOICE

## 2020-03-10 ENCOUNTER — HOSPITAL ENCOUNTER (OUTPATIENT)
Dept: NURSING | Age: 70
Discharge: HOME | End: 2020-03-10
Payer: MEDICARE

## 2020-03-10 VITALS
OXYGEN SATURATION: 100 % | DIASTOLIC BLOOD PRESSURE: 71 MMHG | SYSTOLIC BLOOD PRESSURE: 121 MMHG | HEART RATE: 81 BPM | TEMPERATURE: 98.1 F | RESPIRATION RATE: 16 BRPM

## 2020-03-10 DIAGNOSIS — D50.0 IRON DEFICIENCY ANEMIA DUE TO CHRONIC BLOOD LOSS: Primary | ICD-10-CM

## 2020-03-10 PROCEDURE — 6360000002 HC RX W HCPCS: Performed by: INTERNAL MEDICINE

## 2020-03-10 PROCEDURE — 2580000003 HC RX 258: Performed by: INTERNAL MEDICINE

## 2020-03-10 PROCEDURE — 96365 THER/PROPH/DIAG IV INF INIT: CPT

## 2020-03-10 RX ORDER — SODIUM CHLORIDE 9 MG/ML
INJECTION, SOLUTION INTRAVENOUS CONTINUOUS
Status: CANCELLED | OUTPATIENT
Start: 2020-03-10

## 2020-03-10 RX ORDER — SODIUM CHLORIDE 0.9 % (FLUSH) 0.9 %
5 SYRINGE (ML) INJECTION PRN
Status: CANCELLED | OUTPATIENT
Start: 2020-03-10

## 2020-03-10 RX ORDER — SODIUM CHLORIDE 0.9 % (FLUSH) 0.9 %
10 SYRINGE (ML) INJECTION PRN
Status: CANCELLED | OUTPATIENT
Start: 2020-03-10

## 2020-03-10 RX ORDER — SODIUM CHLORIDE 9 MG/ML
INJECTION, SOLUTION INTRAVENOUS CONTINUOUS
Status: ACTIVE | OUTPATIENT
Start: 2020-03-10 | End: 2020-03-10

## 2020-03-10 RX ORDER — METHYLPREDNISOLONE SODIUM SUCCINATE 125 MG/2ML
125 INJECTION, POWDER, LYOPHILIZED, FOR SOLUTION INTRAMUSCULAR; INTRAVENOUS ONCE
Status: CANCELLED | OUTPATIENT
Start: 2020-03-10

## 2020-03-10 RX ORDER — DIPHENHYDRAMINE HYDROCHLORIDE 50 MG/ML
50 INJECTION INTRAMUSCULAR; INTRAVENOUS ONCE
Status: CANCELLED | OUTPATIENT
Start: 2020-03-10

## 2020-03-10 RX ORDER — SODIUM CHLORIDE 0.9 % (FLUSH) 0.9 %
10 SYRINGE (ML) INJECTION PRN
Status: DISCONTINUED | OUTPATIENT
Start: 2020-03-10 | End: 2020-03-11 | Stop reason: HOSPADM

## 2020-03-10 RX ORDER — HEPARIN SODIUM (PORCINE) LOCK FLUSH IV SOLN 100 UNIT/ML 100 UNIT/ML
500 SOLUTION INTRAVENOUS PRN
Status: CANCELLED | OUTPATIENT
Start: 2020-03-10

## 2020-03-10 RX ADMIN — Medication 10 ML: at 10:54

## 2020-03-10 RX ADMIN — FERRIC CARBOXYMALTOSE INJECTION 750 MG: 50 INJECTION, SOLUTION INTRAVENOUS at 10:59

## 2020-03-10 NOTE — PROGRESS NOTES
1100 pt arrives to OPN for IV Injectofer. All questions and concerns addressed. Pt denies reactions from previous dose  1104 PATIENT RIGHTS AND RESPONSIBILITIES SHEET OFFERED TO PT TO READ.   1110 call light in reach, denies refreshments  1115 __m__ Safety:       (Environmental)  [place] to environment   Ensure ID band is correct and in place/ allergy band as needed   Assess for fall risk   Initiate fall precautions as applicable (fall band, side rails, etc.)   Call light within reach   Bed in low position/ wheels locked    _m___ Pain:        Assess pain level and characteristics   Administer analgesics as ordered   Assess effectiveness of pain management and report to MD as needed    _m___ Knowledge Deficit:   Assess baseline knowledge   Provide teaching at level of understanding   Provide teaching via preferred learning method   Evaluate teaching effectiveness    __m__ Hemodynamic/Respiratory Status:       (Pre and Post Procedure Monitoring)   Assess/Monitor vital signs and LOC   Assess Baseline SpO2 prior to any sedation   Obtain weight/height   Assess vital signs/ LOC until patient meets discharge criteria   Monitor procedure site and notify MD of any issues    1120 WRITTEN DISCHARGE INSTRUCTIONS GIVEN TO PT-VERBALIZES UNDERSTANDING  1130 PT DISCHARGED AMBULATORY IN SATISFACTORY CONDITION

## 2020-03-17 ENCOUNTER — TELEPHONE (OUTPATIENT)
Dept: FAMILY MEDICINE CLINIC | Age: 70
End: 2020-03-17

## 2020-03-17 ENCOUNTER — HOSPITAL ENCOUNTER (OUTPATIENT)
Dept: WOMENS IMAGING | Age: 70
Discharge: HOME | End: 2020-03-17
Payer: MEDICARE

## 2020-03-17 PROCEDURE — 77063 BREAST TOMOSYNTHESIS BI: CPT

## 2020-03-17 NOTE — TELEPHONE ENCOUNTER
----- Message from Royce Snell DO sent at 3/17/2020  4:00 PM EDT -----  Please let pt know that mammogram is normal. Let me know if questions, thanks!

## 2020-03-31 RX ORDER — CITALOPRAM 20 MG/1
TABLET ORAL
Qty: 90 TABLET | Refills: 3 | Status: SHIPPED | OUTPATIENT
Start: 2020-03-31 | End: 2021-05-03

## 2020-04-16 ENCOUNTER — HOSPITAL ENCOUNTER (OUTPATIENT)
Dept: INFUSION THERAPY | Age: 70
Discharge: HOME | End: 2020-04-16
Payer: MEDICARE

## 2020-04-16 ENCOUNTER — OFFICE VISIT (OUTPATIENT)
Dept: ONCOLOGY | Age: 70
End: 2020-04-16
Payer: MEDICARE

## 2020-04-16 VITALS
HEART RATE: 89 BPM | OXYGEN SATURATION: 96 % | WEIGHT: 190.8 LBS | TEMPERATURE: 98.2 F | SYSTOLIC BLOOD PRESSURE: 155 MMHG | BODY MASS INDEX: 33.81 KG/M2 | RESPIRATION RATE: 18 BRPM | HEIGHT: 63 IN | DIASTOLIC BLOOD PRESSURE: 74 MMHG

## 2020-04-16 DIAGNOSIS — D50.0 IRON DEFICIENCY ANEMIA DUE TO CHRONIC BLOOD LOSS: ICD-10-CM

## 2020-04-16 LAB
ABSOLUTE RETIC #: 98 THOU/MM3 (ref 20–115)
ALBUMIN SERPL-MCNC: 4.5 G/DL (ref 3.5–5.1)
ALP BLD-CCNC: 88 U/L (ref 38–126)
ALT SERPL-CCNC: 50 U/L (ref 11–66)
AST SERPL-CCNC: 34 U/L (ref 5–40)
BASOPHILS # BLD: 0.7 %
BASOPHILS ABSOLUTE: 0 THOU/MM3 (ref 0–0.1)
BILIRUB SERPL-MCNC: 0.4 MG/DL (ref 0.3–1.2)
BILIRUBIN DIRECT: < 0.2 MG/DL (ref 0–0.3)
BUN BLDV-MCNC: 16 MG/DL (ref 7–22)
CHLORIDE, WHOLE BLOOD: 103 MEQ/L (ref 98–109)
CO2: 26 MEQ/L (ref 23–33)
CREATININE, WHOLE BLOOD: 0.6 MG/DL (ref 0.5–1.2)
EOSINOPHIL # BLD: 2 %
EOSINOPHILS ABSOLUTE: 0.1 THOU/MM3 (ref 0–0.4)
FERRITIN: 449 NG/ML (ref 10–291)
GFR, ESTIMATED: > 90 ML/MIN/1.73M2
GLUCOSE, WHOLE BLOOD: 127 MG/DL (ref 70–108)
HCT VFR BLD CALC: 41.8 % (ref 37–47)
HEMOGLOBIN: 13.8 GM/DL (ref 12–16)
IMMATURE GRANS (ABS): 0.03 THOU/MM3 (ref 0–0.07)
IMMATURE GRANULOCYTES: 0.5 %
IMMATURE RETIC FRACT: 13 % (ref 3–15.9)
IONIZED CALCIUM, WHOLE BLOOD: 1.21 MMOL/L (ref 1.12–1.32)
IRON: 121 UG/DL (ref 50–170)
LYMPHOCYTES # BLD: 27.5 %
LYMPHOCYTES ABSOLUTE: 1.8 THOU/MM3 (ref 1–4.8)
MCH RBC QN AUTO: 27.7 PG (ref 27–31)
MCHC RBC AUTO-ENTMCNC: 33 GM/DL (ref 33–37)
MCV RBC AUTO: 84 FL (ref 81–99)
MONOCYTES # BLD: 8.6 %
MONOCYTES ABSOLUTE: 0.6 THOU/MM3 (ref 0.4–1.3)
NUCLEATED RED BLOOD CELLS: 0 /100 WBC
PDW BLD-RTO: 24.5 % (ref 11.5–14.5)
PLATELET # BLD: 289 THOU/MM3 (ref 130–400)
PMV BLD AUTO: 8.1 FL (ref 7.4–10.4)
POTASSIUM, WHOLE BLOOD: 4.2 MEQ/L (ref 3.5–4.9)
RBC # BLD: 4.97 MILL/MM3 (ref 4.2–5.4)
RETIC HEMOGLOBIN: 37.5 PG (ref 28.2–35.7)
RETICULOCYTE ABSOLUTE COUNT: 2 % (ref 0.5–2)
SEG NEUTROPHILS: 60.7 %
SEGMENTED NEUTROPHILS ABSOLUTE COUNT: 4 THOU/MM3 (ref 1.8–7.7)
SODIUM, WHOLE BLOOD: 143 MEQ/L (ref 138–146)
TOTAL IRON BINDING CAPACITY: 314 UG/DL (ref 171–450)
TOTAL PROTEIN: 7.3 G/DL (ref 6.1–8)
WBC # BLD: 6.6 THOU/MM3 (ref 4.8–10.8)

## 2020-04-16 PROCEDURE — 80076 HEPATIC FUNCTION PANEL: CPT

## 2020-04-16 PROCEDURE — 82728 ASSAY OF FERRITIN: CPT

## 2020-04-16 PROCEDURE — 85025 COMPLETE CBC W/AUTO DIFF WBC: CPT

## 2020-04-16 PROCEDURE — 80047 BASIC METABLC PNL IONIZED CA: CPT

## 2020-04-16 PROCEDURE — 83550 IRON BINDING TEST: CPT

## 2020-04-16 PROCEDURE — 99211 OFF/OP EST MAY X REQ PHY/QHP: CPT

## 2020-04-16 PROCEDURE — 99214 OFFICE O/P EST MOD 30 MIN: CPT | Performed by: INTERNAL MEDICINE

## 2020-04-16 PROCEDURE — 85046 RETICYTE/HGB CONCENTRATE: CPT

## 2020-04-16 PROCEDURE — 83540 ASSAY OF IRON: CPT

## 2020-04-16 PROCEDURE — 82374 ASSAY BLOOD CARBON DIOXIDE: CPT

## 2020-04-16 PROCEDURE — 84520 ASSAY OF UREA NITROGEN: CPT

## 2020-04-16 PROCEDURE — 36415 COLL VENOUS BLD VENIPUNCTURE: CPT

## 2020-04-16 NOTE — PROGRESS NOTES
[facility] PROFESSIONAL SERVICES  [facility]  [address] [facility] [address] [facility] [address]  [address] [address]  Dept: [phone]  Dept Fax: [phone]  Loc: [phone]    Subjective:      Chief Complaint: Delfina Morocho is a 68 y.o. female with a history of breast caner. The patient underwent a routine mammogram on 02/03/2014 which revealed an abnormality. She returned for a follow up study on 04/03/2014 which included an ultrasound that was suspicious of malignancy. A biopsy confirmed a grade 2 infiltrating ductal carcinoma in the right breast. The malignancy was estrogen receptor positive (100%), progesterone receptor positive (90%) and HER-2/julienne overexpression was negative. A lumpectomy with sentinel lymph node biopsy was completed on 04/24/2014. Surgical pathology revealed a tumor measuring 0.4 cm with four negative sentinel lymph nodes. The patient underwent right breast radiation from 06/09/2014 through 07/20/2014. She started to Anastrozole therapy on 07/30/2014. HPI:   Lina Wing is here today for follow-up regarding her history of breast cancer. She is also had a more recent history of iron deficiency anemia. The patient has been taking oral iron supplementation and with this her hemoglobin and hematocrit have improved. On today's evaluation her hemoglobin hematocrit are both normal.  The patient has been scheduled for colonoscopy but this has been delayed secondary to the COVID-19 pandemic. All elective procedures have been on hold and once this has been resolved the patient will plan to proceed with her colonoscopy. In regards to her breast cancer she has no signs or symptoms of be suggestive of recurrence of her malignancy. The patient is completed a 5-year course of therapy with Arimidex. The patient's not had fever, shortness of breath, or cough. She has had no other signs of infection. Both her bowel and bladder habits have been stable. ECOG performance status is level 0.   The

## 2020-06-16 RX ORDER — BUSPIRONE HYDROCHLORIDE 10 MG/1
TABLET ORAL
Qty: 180 TABLET | Refills: 3 | Status: SHIPPED | OUTPATIENT
Start: 2020-06-16 | End: 2021-06-22

## 2020-06-17 ENCOUNTER — TELEPHONE (OUTPATIENT)
Dept: FAMILY MEDICINE CLINIC | Age: 70
End: 2020-06-17

## 2020-06-17 RX ORDER — HYDROCHLOROTHIAZIDE 25 MG/1
25 TABLET ORAL EVERY MORNING
Qty: 90 TABLET | Refills: 3 | Status: SHIPPED | OUTPATIENT
Start: 2020-06-17 | End: 2020-07-14

## 2020-06-17 RX ORDER — VALSARTAN 320 MG/1
320 TABLET ORAL DAILY
Qty: 90 TABLET | Refills: 3 | Status: SHIPPED | OUTPATIENT
Start: 2020-06-17 | End: 2020-06-18 | Stop reason: RX

## 2020-06-17 NOTE — TELEPHONE ENCOUNTER
Spoke with [facility], the medication is currently on back order, this is why the valsartan-hydrochlorothiazide was not able to be filled. Spoke with pt, she has a weeks worth of medication left. Please advise.

## 2020-06-18 ENCOUNTER — TELEPHONE (OUTPATIENT)
Dept: FAMILY MEDICINE CLINIC | Age: 70
End: 2020-06-18

## 2020-06-18 RX ORDER — LOSARTAN POTASSIUM 100 MG/1
100 TABLET ORAL DAILY
Qty: 90 TABLET | Refills: 3 | Status: SHIPPED | OUTPATIENT
Start: 2020-06-18 | End: 2020-07-14

## 2020-06-18 NOTE — TELEPHONE ENCOUNTER
RX change request  06/18/20    valsartan (DIOVAN) 320 MG tablet , and combinations are currently unavailable . Please sen a new Rx for an alternative ARB.      See attached for details

## 2020-07-13 NOTE — PROGRESS NOTES
Chief Complaint   Patient presents with    Medicare AWV    6 Month Follow-Up     Chronic issues as noted below       History obtained from the patient. SUBJECTIVE:  Tierney Padron is a 65 y.o. female that presents today for       -HTN:    HPI:     Taking meds as prescribed ?: yes  Tolerating well ?: yes  Side Effects ?: denies  BP at home ?: <140/90  Working on TLCS ?: yes  Chest Pain/SOB/Palpitations? denies    BP Readings from Last 3 Encounters:   07/14/20 122/72   04/16/20 (!) 155/74   03/10/20 121/71         -PreDM PRIOR VISIT: wts going up d/t estrogen blocker. Due for labs. Trying to watch diet. UPDATE PRIOR VISIT: wts down a few labs. Had labs done, those are pending. UPDATE PRIOR VISIT: wts up a bit. Higher d/t estrogen blockers. Trying to make diet changes. a1c stable. UPDATE PRIOR VISIT: wts stable. Labs a little worse. On meformin. Working on life style changes. UPDATE PRIOR VISIT: wts improved. Labs stable. On metformin. Working on life style changes. UPDATE LAST VISIT:   Labs stable  wts about the same  On metfromin  Working on life style changes     UPDATE TODAY:   Due for labs  On metformin  Working on lifestyle changes       Lab Results   Component Value Date    LABA1C 6.3 11/06/2019    LABA1C 6.1 04/26/2019    LABA1C 6.4 10/23/2018    LABA1C 6.3 04/24/2018    LABA1C 6.1 04/27/2017    LABA1C 6.1 09/28/2016     Wt Readings from Last 3 Encounters:   07/14/20 192 lb (87.1 kg)   04/16/20 190 lb 12.8 oz (86.5 kg)   01/14/20 189 lb (85.7 kg)       -HLD:    HPI:    Taking meds as prescribed ?: yes  Tolerating well ?: yes  Side Effects ?: denies  Muscle Pain?: denies  Working on TLCS ?: yes      -Depression/Anxiety: moods stable on celexa and buspar. Denies SI/HI. meds working well.  Would like to continue.       -Osteopenia:  Is on alendronate, has dexa ordered from oncology  Also on anastrozolefor breast CA  No falls or fxrs      -TIFFANY LAST VISIT:  On iron   Saw GI, neg w/u  Saw heme, neg w/u  Due for f/u labs, has order  No bleeding, melena or hematochezia    UPDATE TODAY:   Neg w/u with GI and heme  On iron   Cbc back to WNL  No abd pain  Denies bleeding,melena or hematochezia. -Hx of Breast CA:  Under went R lumpectomy 2014  Seeing Dr. Tom VELAZQUEZ on imaging  Needs to make f/u apt      Age/Gender Health Maintenance    Lipid - ; LDL 76; HDL 50;  (OCT 2018)  TC  177;  LDL 86; HDL 60;  (OCT 2017)  ; LDL 91; HDL 60;  (JAN 2016)    Lab Results   Component Value Date    CHOL 159 11/06/2019    CHOL 155 10/23/2018    CHOL 154 09/28/2016     Lab Results   Component Value Date    TRIG 129 11/06/2019    TRIG 144 10/23/2018    TRIG 139 09/28/2016     Lab Results   Component Value Date    HDL 54 11/06/2019    HDL 50 10/23/2018    HDL 53 09/28/2016     Lab Results   Component Value Date    LDLCALC 79 11/06/2019    LDLCALC 76 10/23/2018    LDLCALC 73 09/28/2016       DM Screen -   Lab Results   Component Value Date    GLUCOSE 148 11/06/2019       123 with a1c 6.4 (OCT 2018)  114 (JAN 2016) with A1C 6.0 (JAN 2016)  Glucose: 134/A1C 6.1 (SEPT 2016)    Lab Results   Component Value Date    LABA1C 6.3 11/06/2019     No results found for: EAG      Colon Cancer Screening - NEG June 2016, repeat 10 years/ NEG SEPT 2019 with hyperplastic polyp SEPT 2019, repeat 10 years (Dr. Lesley Sagastume)   Zain Brumfield (Age 52 to [de-identified] with 30 pack year hx, current smoker or quit within past 15 years) - never smoker    Tetanus - to get at pharmacy per medicare rules  Influenza Vaccine - UTD FALL 2019  Pneumonia Vaccine - UTD PCV 13 OCT 2016/PPV 23 in OCT 2018  Zoster - pt to get at pharmacy (Christie 97 2016)/to get at pharmacy per medicare rules    Breast Cancer Screening - Birads 3 JAN 2018, repeat 6 months, oncology managing. Alexus Short 3 July 2018 on Left with repeat 6 months, oncology managing/NEG bilat dx mammo MARCH 2019/neg mammo MARCH 2020  Osteoporosis Screening - Osteopenia MAY 2014/with stable osteopniea JAN 2018 repeat 3 years    Specialist List  Ortho: Shashank Masha: [place]  GI: Briantaneshamaria esther      Current Outpatient Medications   Medication Sig Dispense Refill    valsartan-hydrochlorothiazide (DIOVAN-HCT) 320-25 MG per tablet Take 1 tablet by mouth daily      busPIRone (BUSPAR) 10 MG tablet TAKE 1 TABLET BY MOUTH TWICE DAILY 180 tablet 3    citalopram (CELEXA) 20 MG tablet TAKE 1 TABLET BY MOUTH EVERY DAY 90 tablet 3    NONFORMULARY Take 150 mg by mouth Indications: Iron pills  ordered by GI      alendronate (FOSAMAX) 70 MG tablet Take 1 tablet by mouth every 7 days 12 tablet 3    metFORMIN (GLUCOPHAGE) 500 MG tablet TAKE 1 TABLET BY MOUTH TWICE DAILY WITH MEALS 180 tablet 3    atorvastatin (LIPITOR) 20 MG tablet TAKE 1 TABLET BY MOUTH DAILY 90 tablet 3    Misc Natural Products (GLUCOSAMINE CHOND DOUBLE STR) TABS Take by mouth      vitamin D3 (COLECALCIFEROL) 400 UNITS TABS Take by mouth daily      aspirin 81 MG tablet Take 81 mg by mouth daily.  anastrozole (ARIMIDEX) 1 MG tablet TAKE 1 TABLET BY MOUTH DAILY 90 tablet 0     No current facility-administered medications for this visit. No orders of the defined types were placed in this encounter. All medications reviewed and reconciled, including OTC and herbal medications. Updated list given to patient. Patient Active Problem List    Diagnosis Date Noted    Iron deficiency anemia due to chronic blood loss 07/17/2019    Chronic anemia 12/05/2018    Obesity (BMI 30-39. 9)     Pure hypercholesterolemia 10/09/2016    Osteoarthritis of thoracolumbar spine      Following with Dr. Heber Beltran depression     Osteopenia     Encounter for monitoring aromatase inhibitor therapy 11/10/2015    OA (osteoarthritis) 10/11/2014    Essential (primary) hypertension     ALECIA (generalized anxiety disorder)     Breast [facility] 04/23/2014     S/p lumpectomy.  Following with oncology,  Kramer. Past Medical History:   Diagnosis Date    [facility] 4/23/2014    Essential (primary) hypertension     ALECIA (generalized anxiety disorder)     History of hip replacement     Major depression     Morbid obesity (HCC)     OA (osteoarthritis) 10/11/2014    Obesity (BMI 30-39. 9)     Osteoarthritis of thoracolumbar spine     Osteopenia     Prediabetes     Pure hypercholesterolemia 10/9/2016       Past Surgical History:   Procedure Laterality Date    BREAST SURGERY Right     lumpectomy    CHOLECYSTECTOMY  2012    COLONOSCOPY      TONSILLECTOMY         Allergies   Allergen Reactions    Codeine Nausea And Vomiting    Eszopiclone Nausea And Vomiting       Social History     Tobacco Use    Smoking status: Never Smoker    Smokeless tobacco: Never Used   Substance Use Topics    Alcohol use: Yes     Comment: little wine       Family History   Problem Relation Age of Onset    Cancer Mother         Lymphoma    Other Mother     Heart Disease Father         first bypass at age 43    Diabetes Father     Cancer Brother         Lymphoma    Colon Cancer Neg Hx     Breast Cancer Neg Hx          I have reviewed the patient's past medical history, past surgical history, allergies, medications, social and family history and I have made updates where appropriate.       Review of Systems  Positive responses are highlighted in bold    Constitutional:  Fever, Chills, Night Sweats, Fatigue, Unexpected changes in weight  HENT:  Ear pain, Tinnitus, Nosebleeds, Trouble swallowing, Hearing loss, Sore throat  Cardiovascular:  Chest Pain, Palpitations, Orthopnea, Paroxysmal Nocturnal Dyspnea  Respiratory:  Cough, Wheezing, Shortness of breath, Chest tightness, Apnea  Gastrointestinal:  Nausea, Vomiting, Diarrhea, Constipation, Heartburn, Blood in stool  Genitourinary:  Difficulty or painful urination, Flank pain, Change in frequency, Urgency  Skin:  Color change, Rash, Itching, Wound  Musculoskeletal: Hyperglycemia    - Glucose, random; Future  - Hemoglobin A1C; Future    4. Obesity (BMI 30-39. 9)    Discussed wt loss strategies    5. Pure hypercholesterolemia    Stable  con't lipitor  Labs in 6 months    6. Recurrent major depressive disorder, in full remission ([facility]    Doing well  Con't buspar/celexa    7. ALECIA (generalized anxiety disorder)      8. Osteopenia of multiple sites    con't alendronate  Yuan with D  Has dexa ordered through heme/onc    9. Iron deficiency anemia due to chronic blood loss    Improved  Neg w/u  con't iron    10. Malignant neoplasm of female breast, unspecified estrogen receptor status, unspecified laterality, unspecified site of breast ([facility]    In remission  F/u onc    11. History of breast cancer      DISPOSITION    Return in about 6 months (around 1/14/2021) for follow-up on chronic medical conditions, sooner as needed. Sangeeta Cespedes released without restrictions. Future Appointments   Date Time Provider Ramses Melissa   1/14/2021 10:40 AM Fred Mendoza DO [facility]   3/30/2021 12:00 PM [facility] MAMMOGRAPHY RM2  Rosea Schaffer WOMEN [facility] Radiolog   3/30/2021 12:50 PM [facility] DEXA RM STRZ WOMEN [facility] Radiolog   4/1/2021 10:15 AM Gerda Carrillo MD Oncology [address]  received counseling on the following healthy behaviors: nutrition, exercise and medication adherence    Patient given educational materials on: See Attached    I have instructed Sangeeta Cespedes to complete a self tracking handout on Blood Pressures  and instructed them to bring it with them to her next appointment. Barriers to learning and self management: none    Discussed use, benefit, and side effects of prescribed medications. Barriers to medication compliance addressed. All patient questions answered. Pt voiced understanding.        Electronically signed by Fred Mendoza DO on 7/14/2020 at 10:24 AM        Medicare Annual Wellness Visit  Name: Salena Barroso replacement     Major depression     Morbid obesity (HCC)     OA (osteoarthritis) 10/11/2014    Obesity (BMI 30-39. 9)     Osteoarthritis of thoracolumbar spine     Osteopenia     Prediabetes     Pure hypercholesterolemia 10/9/2016       Past Surgical History:   Procedure Laterality Date    BREAST SURGERY Right     lumpectomy    CHOLECYSTECTOMY  2012    COLONOSCOPY      TONSILLECTOMY           Family History   Problem Relation Age of Onset   [facility] Cancer Mother         Lymphoma    Other Mother     Heart Disease Father         first bypass at age 43    Diabetes Father     Cancer Brother         Lymphoma    Colon Cancer Neg Hx     Breast Cancer Neg Hx        CareTeam (Including outside providers/suppliers regularly involved in providing care):   Patient Care Team:  Kelton Brown DO as PCP - General (Family Medicine)  Kelton Brown DO as PCP - [facility] Empaneled Provider    Wt Readings from Last 3 Encounters:   07/14/20 192 lb (87.1 kg)   04/16/20 190 lb 12.8 oz (86.5 kg)   01/14/20 189 lb (85.7 kg)     Vitals:    07/14/20 0929   BP: 122/72   Pulse: 63   Resp: 12   Temp: 98.2 °F (36.8 °C)   TempSrc: Oral   Weight: 192 lb (87.1 kg)   Height: 5' 0.63\" (1.54 m)     Body mass index is 36.72 kg/m². Based upon direct observation of the patient, evaluation of cognition reveals recent and remote memory intact. Patient's complete Health Risk Assessment and screening values have been reviewed and are found in Flowsheets. The following problems were reviewed today and where indicated follow up appointments were made and/or referrals ordered.     Positive Risk Factor Screenings with Interventions:     Health Habits/Nutrition:  Health Habits/Nutrition  Do you exercise for at least 20 minutes 2-3 times per week?: Yes  Have you lost any weight without trying in the past 3 months?: No  Do you eat fewer than 2 meals per day?: No  Have you seen a dentist within the past year?: Yes  Body mass index is 36.72 kg/m². Health Habits/Nutrition Interventions:  · Nutritional issues:  educational materials for healthy, well-balanced diet provided    Personalized Preventive Plan   Current Health Maintenance Status  Immunization History   Administered Date(s) Administered    Influenza Vaccine, unspecified formulation 10/18/2016    Influenza, High Dose (Fluzone 65 yrs and older) 10/18/2018    Influenza, Quadv, IM, (6 mo and older Fluzone, Flulaval, Fluarix and 3 yrs and older Afluria) 10/16/2017    Influenza, Triv, inactivated, subunit, adjuvanted, IM (Fluad 65 yrs and older) 09/18/2019    Pneumococcal Conjugate 13-valent ([id number]) 10/18/2016    Pneumococcal Polysaccharide ([id number]) 10/29/2018        Health Maintenance   Topic Date Due    DTaP/Tdap/Td vaccine (1 - Tdap) 05/10/1967    Shingles Vaccine (1 of 2) 05/10/1998    Annual Wellness Visit (AWV)  05/29/2019    DEXA (modify frequency per FRAX score)  01/02/2020    A1C test (Diabetic or Prediabetic)  05/06/2020    Flu vaccine (1) 09/01/2020    Lipid screen  11/06/2020    Potassium monitoring  11/06/2020    Creatinine monitoring  11/06/2020    Breast cancer screen  03/17/2021    Colon cancer screen colonoscopy  09/05/2029    Pneumococcal 65+ years Vaccine  Completed    Hepatitis C screen  Completed    Hepatitis A vaccine  Aged Out    Hepatitis B vaccine  Aged Out    Hib vaccine  Aged Out    Meningococcal (ACWY) vaccine  Aged Out     Recommendations for [email or web address] Due: see orders and patient instructions/AVS.  . Recommended screening schedule for the next 5-10 years is provided to the patient in written form: see Patient Rolando Sy was seen today for medicare awv and 6 month follow-up. Diagnoses and all orders for this visit:    Routine general medical examination at a health care facility      Care plan reviewed with and given to patient.        Electronically signed by Rosana Perez DO on 7/14/2020 at 10:24 AM

## 2020-07-14 ENCOUNTER — OFFICE VISIT (OUTPATIENT)
Dept: FAMILY MEDICINE CLINIC | Age: 70
End: 2020-07-14
Payer: MEDICARE

## 2020-07-14 VITALS
BODY MASS INDEX: 36.25 KG/M2 | RESPIRATION RATE: 12 BRPM | WEIGHT: 192 LBS | SYSTOLIC BLOOD PRESSURE: 122 MMHG | HEIGHT: 61 IN | TEMPERATURE: 98.2 F | HEART RATE: 63 BPM | DIASTOLIC BLOOD PRESSURE: 72 MMHG

## 2020-07-14 PROCEDURE — G0438 PPPS, INITIAL VISIT: HCPCS | Performed by: FAMILY MEDICINE

## 2020-07-14 RX ORDER — VALSARTAN AND HYDROCHLOROTHIAZIDE 320; 25 MG/1; MG/1
1 TABLET, FILM COATED ORAL DAILY
COMMUNITY
End: 2020-10-09

## 2020-07-14 ASSESSMENT — LIFESTYLE VARIABLES: HOW OFTEN DO YOU HAVE A DRINK CONTAINING ALCOHOL: 0

## 2020-07-14 ASSESSMENT — PATIENT HEALTH QUESTIONNAIRE - PHQ9
SUM OF ALL RESPONSES TO PHQ QUESTIONS 1-9: 1
SUM OF ALL RESPONSES TO PHQ QUESTIONS 1-9: 1

## 2020-07-14 NOTE — PATIENT INSTRUCTIONS
LAB INSTRUCTIONS:    Please complete labs within 4 week(s). Please fast for 8 hours prior to lab collection. The clinic will call you within 1 week of collection. If you have not heard from us within that amount of time, please call us at [phone]. Personalized Preventive Plan for Francois Morillo - 7/14/2020  Medicare offers a range of preventive health benefits. Some of the tests and screenings are paid in full while other may be subject to a deductible, co-insurance, and/or copay. Some of these benefits include a comprehensive review of your medical history including lifestyle, illnesses that may run in your family, and various assessments and screenings as appropriate. After reviewing your medical record and screening and assessments performed today your provider may have ordered immunizations, labs, imaging, and/or referrals for you. A list of these orders (if applicable) as well as your Preventive Care list are included within your After Visit Summary for your review. Other Preventive Recommendations:    · A preventive eye exam performed by an eye specialist is recommended every 1-2 years to screen for glaucoma; cataracts, macular degeneration, and other eye disorders. · A preventive dental visit is recommended every 6 months. · Try to get at least 150 minutes of exercise per week or 10,000 steps per day on a pedometer . · Order or download the FREE \"Exercise & Physical Activity: Your Everyday Guide\" from [employer]. Call 1-550.263.8283 or search [employer] online. · You need 1817-5860 mg of calcium and 0468-4339 IU of vitamin D per day. It is possible to meet your calcium requirement with diet alone, but a vitamin D supplement is usually necessary to meet this goal.  · When exposed to the sun, use a sunscreen that protects against both UVA and UVB radiation with an SPF of 30 or greater.  Reapply every 2 to 3 hours or after sweating, drying off with a towel, or swimming. · Always wear a seat belt when traveling in a car. Always wear a helmet when riding a bicycle or motorcycle.

## 2020-07-30 RX ORDER — ATORVASTATIN CALCIUM 20 MG/1
TABLET, FILM COATED ORAL
Qty: 90 TABLET | Refills: 3 | Status: SHIPPED | OUTPATIENT
Start: 2020-07-30 | End: 2021-08-11

## 2020-10-09 RX ORDER — VALSARTAN AND HYDROCHLOROTHIAZIDE 320; 25 MG/1; MG/1
TABLET, FILM COATED ORAL
Qty: 90 TABLET | Refills: 3 | Status: SHIPPED | OUTPATIENT
Start: 2020-10-09 | End: 2021-10-04

## 2020-10-09 NOTE — TELEPHONE ENCOUNTER
Recent Visits  Date Type Provider Dept   07/14/20 Office Visit Cb Roach DO [facility]   01/14/20 Office Visit Cb Roach DO [facility]   05/01/19 Office Visit Cb Roach DO [facility]   Showing recent visits within past 540 days with a meds authorizing provider and meeting all other requirements     Future Appointments  Date Type Provider Dept   01/14/21 Appointment Cb Roach DO [facility]   Showing future appointments within next 150 days with a meds authorizing provider and meeting all other requirements     Future Appointments   Date Time Provider Ramses Melisas   1/14/2021 10:40 AM [facility]   3/30/2021 12:00 PM STR MAMMOGRAPHY RM2  Edmonia Antonio WOMEN [facility] Radiolog   3/30/2021 12:50 PM STR [facility] DEXA RM STRZ WOMEN [facility] Radiolog   4/1/2021 10:15 AM Lilliana Martinez MD Oncology [facility]

## 2020-12-11 ENCOUNTER — TELEPHONE (OUTPATIENT)
Dept: FAMILY MEDICINE CLINIC | Age: 70
End: 2020-12-11

## 2020-12-14 ENCOUNTER — VIRTUAL VISIT (OUTPATIENT)
Dept: FAMILY MEDICINE CLINIC | Age: 70
End: 2020-12-14
Payer: MEDICARE

## 2020-12-14 VITALS — RESPIRATION RATE: 14 BRPM

## 2020-12-14 PROCEDURE — 99213 OFFICE O/P EST LOW 20 MIN: CPT | Performed by: FAMILY MEDICINE

## 2020-12-14 NOTE — PROGRESS NOTES
Runny nose or congestion -  Yes   Cough -  Yes  Sore throat -  Yes  Headache, fatigue, joint pains, muscle aches -  Not any more  Double Sickening - No  Shortness of breath/Wheezing? -  No  Nausea/Vomiting/Diarrhea? No  Sick contacts - No  Maxillary Tooth Pain -  No  Treatment tried and response - otc meds, helping      Age/Gender Health Maintenance    Lipid - ; LDL 76; HDL 50;  (OCT 2018)  TC  177;  LDL 86; HDL 60;  (OCT 2017)  ; LDL 91; HDL 60;  (JAN 2016)    Lab Results   Component Value Date    CHOL 159 11/06/2019    CHOL 155 10/23/2018    CHOL 154 09/28/2016     Lab Results   Component Value Date    TRIG 129 11/06/2019    TRIG 144 10/23/2018    TRIG 139 09/28/2016     Lab Results   Component Value Date    HDL 54 11/06/2019    HDL 50 10/23/2018    HDL 53 09/28/2016     Lab Results   Component Value Date    LDLCALC 79 11/06/2019    LDLCALC 76 10/23/2018    LDLCALC 73 09/28/2016       DM Screen -   Lab Results   Component Value Date    GLUCOSE 148 11/06/2019       123 with a1c 6.4 (OCT 2018)  114 (JAN 2016) with A1C 6.0 (JAN 2016)  Glucose: 134/A1C 6.1 (SEPT 2016)    Lab Results   Component Value Date    LABA1C 6.3 11/06/2019     No results found for: EAG      Colon Cancer Screening - NEG June 2016, repeat 10 years/ NEG SEPT 2019 with hyperplastic polyp SEPT 2019, repeat 10 years (Dr. Perez [facility])   Cesar Kasper (Age 52 to [de-identified] with 30 pack year hx, current smoker or quit within past 15 years) - never smoker    Tetanus - to get at pharmacy per medicare rules  Influenza Vaccine - UTD FALL 2020  Pneumonia Vaccine - UTD PCV 13 OCT 2016/PPV 23 in OCT 2018  Zoster - pt to get at pharmacy (Christie 97 2016)/to get at pharmacy per medicare rules    Breast Cancer Screening - Jameel 3 JAN 2018, repeat 6 months, oncology managing. Tyler Jenkins 3 July 2018 on Left with repeat 6 months, oncology managing/NEG bilat dx mammo MARCH 2019/neg [facility] MARCH 2020 Osteoporosis Screening - Osteopenia MAY 2014/with stable osteopniea JAN 2018 repeat 3 years    Specialist List  Ortho: Sasha Coleman: Williams  GI: Marino      Current Outpatient Medications   Medication Sig Dispense Refill    valsartan-hydroCHLOROthiazide (DIOVAN-HCT) 320-25 MG per tablet TAKE 1 TABLET BY MOUTH EVERY DAY 90 tablet 3    atorvastatin (LIPITOR) 20 MG tablet TAKE 1 TABLET BY MOUTH DAILY 90 tablet 3    busPIRone (BUSPAR) 10 MG tablet TAKE 1 TABLET BY MOUTH TWICE DAILY 180 tablet 3    citalopram (CELEXA) 20 MG tablet TAKE 1 TABLET BY MOUTH EVERY DAY 90 tablet 3    anastrozole (ARIMIDEX) 1 MG tablet TAKE 1 TABLET BY MOUTH DAILY 90 tablet 0    NONFORMULARY Take 150 mg by mouth Indications: Iron pills  ordered by GI      alendronate (FOSAMAX) 70 MG tablet Take 1 tablet by mouth every 7 days 12 tablet 3    metFORMIN (GLUCOPHAGE) 500 MG tablet TAKE 1 TABLET BY MOUTH TWICE DAILY WITH MEALS 180 tablet 3    Misc Natural Products (GLUCOSAMINE CHOND DOUBLE STR) TABS Take by mouth      vitamin D3 (COLECALCIFEROL) 400 UNITS TABS Take by mouth daily      aspirin 81 MG tablet Take 81 mg by mouth daily. No current facility-administered medications for this visit. No orders of the defined types were placed in this encounter. All medications reviewed and reconciled, including OTC and herbal medications. Updated list given to patient. Patient Active Problem List    Diagnosis Date Noted    Iron deficiency anemia due to chronic blood loss 07/17/2019    Chronic anemia 12/05/2018    Obesity (BMI 30-39. 9)     Pure hypercholesterolemia 10/09/2016    Osteoarthritis of thoracolumbar spine      Following with Dr. Alicia Stark depression     Osteopenia     Encounter for monitoring aromatase inhibitor therapy 11/10/2015    OA (osteoarthritis) 10/11/2014    Essential (primary) hypertension     ALECIA (generalized anxiety disorder)     Breast cancer ([facility] [address] 04/23/2014 S/p lumpectomy. Following with oncology, Dr. Edwina Guerin. Past Medical History:   Diagnosis Date    Breast cancer ([facility] [address] 4/23/2014    Essential (primary) hypertension     ALECIA (generalized anxiety disorder)     History of hip replacement     Major depression     Morbid obesity (HCC)     OA (osteoarthritis) 10/11/2014    Obesity (BMI 30-39. 9)     Osteoarthritis of thoracolumbar spine     Osteopenia     Prediabetes     Pure hypercholesterolemia 10/9/2016       Past Surgical History:   Procedure Laterality Date    BREAST SURGERY Right     lumpectomy    CHOLECYSTECTOMY  2012    COLONOSCOPY      TONSILLECTOMY         Allergies   Allergen Reactions    Codeine Nausea And Vomiting    Eszopiclone Nausea And Vomiting       Social History     Tobacco Use    Smoking status: Never Smoker    Smokeless tobacco: Never Used   Substance Use Topics    Alcohol use: Yes     Comment: little wine       Family History   Problem Relation Age of Onset    Cancer Mother         Lymphoma    Other Mother     Heart Disease Father         first bypass at age 43    Diabetes Father     Cancer Brother         Lymphoma    Colon Cancer Neg Hx     Breast Cancer Neg Hx          I have reviewed the patient's past medical history, past surgical history, allergies, medications, social and family history and I have made updates where appropriate.       Review of Systems  Positive responses are highlighted in bold    Constitutional:  Fever, Chills, Night Sweats, Fatigue, Unexpected changes in weight  HENT:  Ear pain, Tinnitus, Nosebleeds, Trouble swallowing, Hearing loss, Sore throat  Cardiovascular:  Chest Pain, Palpitations, Orthopnea, Paroxysmal Nocturnal Dyspnea  Respiratory:  Cough, Wheezing, Shortness of breath, Chest tightness, Apnea  Gastrointestinal:  Nausea, Vomiting, Diarrhea, Constipation, Heartburn, Blood in stool  Genitourinary:  Difficulty or painful urination, Flank pain, Change in frequency, Urgency Skin:  Color change, Rash, Itching, Wound  Musculoskeletal:  Joint pain, Back pain, Gait problems, Joint swelling, Myalgias  Neurological:  Dizziness, Headaches, Presyncope, Numbness, Seizures, Tremors  Endocrine:  Heat Intolerance, Cold Intolerance, Polydipsia, Polyphagia, Polyuria      PHYSICAL EXAM:  Not all vitals able to be obtained, video visit  Patient-Reported Vitals 12/14/2020   Patient-Reported Pulse 88   Patient-Reported Temperature 98.4      Vitals:    12/14/20 1607   Resp: 14     Pain Score: 1(Throat)    General Appearance: A&O x 3, No acute distress,well developed and well- nourished  Head: normocephalic and atraumatic  Eyes: pupils equal, round, and reactive to light, extraocular eye movements intact, conjunctivae and eye lids without erythema  ENT: External ears w/o redness, external nares without redness or discharge. Hearing is intact. Lips are w/o lesion. Teeth are in good repair. Pulmonary/Chest: normal respiratory effort. Normal respiratory rate. No respiratory distress . Skin: warm and dry, no rash or erythema to visible areas. Psych: Affect appropriate. Mood euthymic. Thought process is normal without evidence of depression or psychosis. Good insight and appropriate interaction. Cognition and memory appear to be intact. ASSESSMENT & PLAN  1. Suspected COVID-19 virus infection    Suspect covid  May be at tail end of it  Would still recommend testing  con't isoation  F/u results once available  F/u if no better  Reviewed ER precautions, pt understands. - COVID-19 Ambulatory; Future      DISPOSITION    Return if symptoms worsen or fail to improve. Mane Monteiro released without restrictions.     Future Appointments   Date Time Provider Ramses Melissa   1/14/2021 10:40 AM Rubina Dawn DO [facility] Med [address]   3/30/2021 12:00 PM STR MAMMOGRAPHY RM2  Taisha Benes WOMEN STR Radiolog   3/30/2021 12:50 PM STR [facility] CENTER DEXA RM STRZ WOMEN STR Radiolog

## 2020-12-15 LAB — SARS-COV-2: DETECTED

## 2020-12-16 ENCOUNTER — TELEPHONE (OUTPATIENT)
Dept: FAMILY MEDICINE CLINIC | Age: 70
End: 2020-12-16

## 2020-12-16 NOTE — TELEPHONE ENCOUNTER
Pt notified and agreeable . Pt states she is feeling better. Starting to regain smell and taste.  Pt will call with any worsening Sx.

## 2020-12-16 NOTE — TELEPHONE ENCOUNTER
----- Message from Rennie Rinne, DO sent at 12/15/2020  7:29 PM EST -----  Please let pt know that covid results are +  Will need complete 10 days of isolation from symptom onset. On day 11, if no sxs or fever may leave isolation  How's she feeling today? Let me know, thanks!

## 2021-01-06 ENCOUNTER — HOSPITAL ENCOUNTER (OUTPATIENT)
Age: 71
Discharge: HOME | End: 2021-01-06
Payer: MEDICARE

## 2021-01-06 DIAGNOSIS — R73.03 PREDIABETES: Chronic | ICD-10-CM

## 2021-01-06 DIAGNOSIS — I10 ESSENTIAL (PRIMARY) HYPERTENSION: Chronic | ICD-10-CM

## 2021-01-06 LAB
ALBUMIN SERPL-MCNC: 4.7 G/DL (ref 3.5–5.1)
ALP BLD-CCNC: 89 U/L (ref 38–126)
ALT SERPL-CCNC: 49 U/L (ref 11–66)
ANION GAP SERPL CALCULATED.3IONS-SCNC: 15 MEQ/L (ref 8–16)
AST SERPL-CCNC: 42 U/L (ref 5–40)
AVERAGE GLUCOSE: 111 MG/DL (ref 70–126)
BASOPHILS # BLD: 0.5 %
BASOPHILS ABSOLUTE: 0 THOU/MM3 (ref 0–0.1)
BILIRUB SERPL-MCNC: 0.5 MG/DL (ref 0.3–1.2)
BUN BLDV-MCNC: 17 MG/DL (ref 7–22)
CALCIUM SERPL-MCNC: 9.8 MG/DL (ref 8.5–10.5)
CHLORIDE BLD-SCNC: 101 MEQ/L (ref 98–111)
CHOLESTEROL, TOTAL: 170 MG/DL (ref 100–199)
CO2: 26 MEQ/L (ref 23–33)
CREAT SERPL-MCNC: 0.6 MG/DL (ref 0.4–1.2)
CREATININE, URINE: 271.7 MG/DL
EOSINOPHIL # BLD: 2.3 %
EOSINOPHILS ABSOLUTE: 0.1 THOU/MM3 (ref 0–0.4)
ERYTHROCYTE [DISTWIDTH] IN BLOOD BY AUTOMATED COUNT: 12.9 % (ref 11.5–14.5)
ERYTHROCYTE [DISTWIDTH] IN BLOOD BY AUTOMATED COUNT: 44.3 FL (ref 35–45)
GFR SERPL CREATININE-BSD FRML MDRD: > 90 ML/MIN/1.73M2
GLUCOSE BLD-MCNC: 139 MG/DL (ref 70–108)
HBA1C MFR BLD: 5.7 % (ref 4.4–6.4)
HCT VFR BLD CALC: 46 % (ref 37–47)
HDLC SERPL-MCNC: 53 MG/DL
HEMOGLOBIN: 15.4 GM/DL (ref 12–16)
IMMATURE GRANS (ABS): 0.02 THOU/MM3 (ref 0–0.07)
IMMATURE GRANULOCYTES: 0.3 %
LDL CHOLESTEROL CALCULATED: 79 MG/DL
LYMPHOCYTES # BLD: 27.5 %
LYMPHOCYTES ABSOLUTE: 1.7 THOU/MM3 (ref 1–4.8)
MCH RBC QN AUTO: 31.7 PG (ref 26–33)
MCHC RBC AUTO-ENTMCNC: 33.5 GM/DL (ref 32.2–35.5)
MCV RBC AUTO: 94.7 FL (ref 81–99)
MICROALBUMIN UR-MCNC: 4.17 MG/DL
MICROALBUMIN/CREAT UR-RTO: 15 MG/G (ref 0–30)
MONOCYTES # BLD: 7.9 %
MONOCYTES ABSOLUTE: 0.5 THOU/MM3 (ref 0.4–1.3)
NUCLEATED RED BLOOD CELLS: 0 /100 WBC
PLATELET # BLD: 285 THOU/MM3 (ref 130–400)
PMV BLD AUTO: 10.2 FL (ref 9.4–12.4)
POTASSIUM SERPL-SCNC: 4.5 MEQ/L (ref 3.5–5.2)
RBC # BLD: 4.86 MILL/MM3 (ref 4.2–5.4)
SEG NEUTROPHILS: 61.5 %
SEGMENTED NEUTROPHILS ABSOLUTE COUNT: 3.7 THOU/MM3 (ref 1.8–7.7)
SODIUM BLD-SCNC: 142 MEQ/L (ref 135–145)
TOTAL PROTEIN: 7.4 G/DL (ref 6.1–8)
TRIGL SERPL-MCNC: 188 MG/DL (ref 0–199)
TSH SERPL DL<=0.05 MIU/L-ACNC: 2.13 UIU/ML (ref 0.4–4.2)
WBC # BLD: 6 THOU/MM3 (ref 4.8–10.8)

## 2021-01-06 PROCEDURE — 82043 UR ALBUMIN QUANTITATIVE: CPT

## 2021-01-06 PROCEDURE — 84443 ASSAY THYROID STIM HORMONE: CPT

## 2021-01-06 PROCEDURE — 83036 HEMOGLOBIN GLYCOSYLATED A1C: CPT

## 2021-01-06 PROCEDURE — 85025 COMPLETE CBC W/AUTO DIFF WBC: CPT

## 2021-01-06 PROCEDURE — 80053 COMPREHEN METABOLIC PANEL: CPT

## 2021-01-06 PROCEDURE — 80061 LIPID PANEL: CPT

## 2021-01-06 PROCEDURE — 36415 COLL VENOUS BLD VENIPUNCTURE: CPT

## 2021-01-07 ENCOUNTER — TELEPHONE (OUTPATIENT)
Dept: FAMILY MEDICINE CLINIC | Age: 71
End: 2021-01-07

## 2021-01-07 NOTE — TELEPHONE ENCOUNTER
----- Message from Geovany Iglesias, DO sent at 1/6/2021  7:54 PM EST -----  Please let pt know that labs are stable and appropriate. Let me know if questions, thanks!

## 2021-01-13 NOTE — PROGRESS NOTES
falls or fxrs      -TIFFANY PRIOr VISIT:  On iron   Saw GI, neg w/u  Saw heme, neg w/u  Due for f/u labs, has order  No bleeding, melena or hematochezia    UPDATE LAST VISIT:   Neg w/u with GI and heme  On iron   Cbc back to WNL  No abd pain  Denies bleeding,melena or hematochezia. UPDATE TODAY:   Doing well  On iron once daily  Labs improved  No bleeding, melena or hematochezia       -Hx of Breast CA:  Under went R lumpectomy 2014  Seeing Dr. Bernadette VELAZQUEZ on imaging  Needs to make f/u apt      Age/Gender Health Maintenance    Lipid - ; LDL 76; HDL 50;  (OCT 2018)  TC  177;  LDL 86; HDL 60;  (OCT 2017)  ; LDL 91; HDL 60;  (JAN 2016)    Lab Results   Component Value Date    CHOL 170 01/06/2021    CHOL 159 11/06/2019    CHOL 155 10/23/2018     Lab Results   Component Value Date    TRIG 188 01/06/2021    TRIG 129 11/06/2019    TRIG 144 10/23/2018     Lab Results   Component Value Date    HDL 53 01/06/2021    HDL 54 11/06/2019    HDL 50 10/23/2018     Lab Results   Component Value Date    LDLCALC 79 01/06/2021    LDLCALC 79 11/06/2019    LDLCALC 76 10/23/2018       DM Screen -   Lab Results   Component Value Date    GLUCOSE 139 01/06/2021    GLUCOSE 148 11/06/2019     Lab Results   Component Value Date    LABA1C 5.7 01/06/2021    LABA1C 6.3 11/06/2019    LABA1C 6.1 04/26/2019    LABA1C 6.4 10/23/2018    LABA1C 6.3 04/24/2018    LABA1C 6.1 04/27/2017     123 with a1c 6.4 (OCT 2018)  114 (JAN 2016) with A1C 6.0 (JAN 2016)  Glucose: 134/A1C 6.1 (SEPT 2016)      Colon Cancer Screening - NEG June 2016, repeat 10 years/ NEG SEPT 2019 with hyperplastic polyp SEPT 2019, repeat 10 years (Dr. Terrence Chen)   Gama Soto (Age 52 to [de-identified] with 30 pack year hx, current smoker or quit within past 15 years) - never smoker    Tetanus - to get at pharmacy per medicare rules  Influenza Vaccine - UTD FALL 2020  Pneumonia Vaccine - UTD PCV 13 OCT 2016/PPV 23 in OCT 2018  Zoster - pt to get at pharmacy (Christie Patel 2016)/to get at pharmacy per medicare rules    Breast Cancer Screening - Birads 3 JAN 2018, repeat 6 months, oncology managing. Jannette Coy 3 July 2018 on Left with repeat 6 months, oncology managing/NEG bilat dx mammo MARCH 2019/neg mammo MARCH 2020  Osteoporosis Screening - Osteopenia MAY 2014/with stable osteopniea JAN 2018 repeat 3 years    Specialist List  Ortho: Roland Spencer: Williams  GI: Rhinesmiteverett      Current Outpatient Medications   Medication Sig Dispense Refill    valsartan-hydroCHLOROthiazide (DIOVAN-HCT) 320-25 MG per tablet TAKE 1 TABLET BY MOUTH EVERY DAY 90 tablet 3    atorvastatin (LIPITOR) 20 MG tablet TAKE 1 TABLET BY MOUTH DAILY 90 tablet 3    busPIRone (BUSPAR) 10 MG tablet TAKE 1 TABLET BY MOUTH TWICE DAILY 180 tablet 3    citalopram (CELEXA) 20 MG tablet TAKE 1 TABLET BY MOUTH EVERY DAY 90 tablet 3    NONFORMULARY Take 150 mg by mouth Indications: Iron pills  ordered by GI      alendronate (FOSAMAX) 70 MG tablet Take 1 tablet by mouth every 7 days 12 tablet 3    metFORMIN (GLUCOPHAGE) 500 MG tablet TAKE 1 TABLET BY MOUTH TWICE DAILY WITH MEALS 180 tablet 3    Misc Natural Products (GLUCOSAMINE CHOND DOUBLE STR) TABS Take by mouth      vitamin D3 (COLECALCIFEROL) 400 UNITS TABS Take by mouth daily      aspirin 81 MG tablet Take 81 mg by mouth daily.  anastrozole (ARIMIDEX) 1 MG tablet TAKE 1 TABLET BY MOUTH DAILY 90 tablet 0     No current facility-administered medications for this visit. No orders of the defined types were placed in this encounter. All medications reviewed and reconciled, including OTC and herbal medications. Updated list given to patient. Patient Active Problem List    Diagnosis Date Noted    Iron deficiency anemia due to chronic blood loss 07/17/2019    Chronic anemia 12/05/2018    Obesity (BMI 30-39. 9)     Pure hypercholesterolemia 10/09/2016    Osteoarthritis of thoracolumbar spine      Following with Dr. Marlon Rodriguez Major depression     Osteopenia     Encounter for monitoring aromatase inhibitor therapy 11/10/2015    OA (osteoarthritis) 10/11/2014    Essential (primary) hypertension     ALECIA (generalized anxiety disorder)     [facility]     S/p lumpectomy. Following with oncology, Dr. Florinda Akbar. Past Medical History:   Diagnosis Date    [facility]    Essential (primary) hypertension     ALECIA (generalized anxiety disorder)     History of hip replacement     Major depression     Morbid obesity (HCC)     OA (osteoarthritis) 10/11/2014    Obesity (BMI 30-39. 9)     Osteoarthritis of thoracolumbar spine     Osteopenia     Prediabetes     Pure hypercholesterolemia 10/9/2016       Past Surgical History:   Procedure Laterality Date    BREAST SURGERY Right     lumpectomy    CHOLECYSTECTOMY  2012    COLONOSCOPY      TONSILLECTOMY         Allergies   Allergen Reactions    Codeine Nausea And Vomiting    Eszopiclone Nausea And Vomiting       Social History     Tobacco Use    Smoking status: Never Smoker    Smokeless tobacco: Never Used   Substance Use Topics    Alcohol use: Yes     Comment: little wine       Family History   Problem Relation Age of Onset    Cancer Mother         Lymphoma    Other Mother     Heart Disease Father         first bypass at age 43    Diabetes Father     Cancer Brother         Lymphoma    Colon Cancer Neg Hx     Breast Cancer Neg Hx          I have reviewed the patient's past medical history, past surgical history, allergies, medications, social and family history and I have made updates where appropriate.       Review of Systems  Positive responses are highlighted in bold    Constitutional:  Fever, Chills, Night Sweats, Fatigue, Unexpected changes in weight  HENT:  Ear pain, Tinnitus, Nosebleeds, Trouble swallowing, Hearing loss, Sore throat  Cardiovascular:  Chest Pain, Palpitations, Orthopnea, Paroxysmal Nocturnal Dyspnea  Respiratory: Cough, Wheezing, Shortness of breath, Chest tightness, Apnea  Gastrointestinal:  Nausea, Vomiting, Diarrhea, Constipation, Heartburn, Blood in stool  Genitourinary:  Difficulty or painful urination, Flank pain, Change in frequency, Urgency  Skin:  Color change, Rash, Itching, Wound  Musculoskeletal:  Joint pain, Back pain, Gait problems, Joint swelling, Myalgias  Neurological:  Dizziness, Headaches, Presyncope, Numbness, Seizures, Tremors  Endocrine:  Heat Intolerance, Cold Intolerance, Polydipsia, Polyphagia, Polyuria      PHYSICAL EXAM:  Vitals:    01/14/21 1043   BP: 128/74   Pulse: 72   Resp: 12   Temp: 98.1 °F (36.7 °C)   TempSrc: Oral   SpO2: 98%   Weight: 185 lb (83.9 kg)   Height: 5' 2\" (1.575 m)     Body mass index is 33.84 kg/m². Pain Score:   0 - No pain    VS Reviewed  General Appearance: A&O x 3, No acute distress,well developed and well- nourished  Eyes: pupils equal, round, and reactive to light, extraocular eye movements intact, conjunctivae and eye lids without erythema  ENT: external ear and ear canal clear bilaterally, TMs intact and regular, nose without deformity, nasal mucosa and turbinates normal without polyps, oropharynx normal, dentition is normal for age  Neck: supple and non-tender without mass, no thyromegaly or thyroid nodules, no cervical lymphadenopathy  Pulmonary/Chest: clear to auscultation bilaterally- no wheezes, rales or rhonchi, normal air movement, no respiratory distress or retractions  Cardiovascular: S1 and S2 auscultated w/ RRR. No murmurs, rubs, clicks, or gallops, distal pulses intact. Abdomen: soft, non-tender, non-distended, bowl sounds physiologic,  no rebound or guarding, no masses or hernias noted. Liver and spleen without enlargement. Extremities: no cyanosis, clubbing or edema of the lower extremities. Skin: warm and dry, no rash or erythema  Psych: Affect appropriate. Mood euthymic. Thought process is normal without evidence of depression or psychosis.  Good insight and appropriate interaction. Cognition and memory appear to be intact. ASSESSMENT & PLAN  1. Essential (primary) hypertension    At goal  con't meds  Labs UTD    2. Prediabetes    Stable   con't lifestyle changes/metformin    3. Obesity (BMI 30-39.9)    con't wt loss measures    4. Pure hypercholesterolemia    Stable  con't lipitor  Labs UTD    5. Recurrent major depressive disorder, in full remission ([facility] [address]    Stable  Doing well  Con't buspar/celexa    6. ALCEIA (generalized anxiety disorder)    As per # 5    7. Osteopenia of multiple sites    con't alendronate  Yuan with D  Has dexa ordered through heme/onc    8. Iron deficiency anemia due to chronic blood loss    Improved  Neg w/u  con't iron      9. Malignant neoplasm of female breast, unspecified estrogen receptor status, unspecified laterality, unspecified site of breast ([facility] [address]    In remission  F/u onc    10. History of breast cancer      DISPOSITION    Return in about 6 months (around 7/19/2021) for AWV, follow-up on chronic medical conditions, sooner as needed. Quyen Dudley released without restrictions. Future Appointments   Date Time Provider Ramses Melissa   3/30/2021 12:00 PM STR MAMMOGRAPHY RM2  [facility] WOMEN STR Radiolog   3/30/2021 12:50 PM STR [facility] CENTER DEXA RM STRZ WOMEN STR Radiolog   4/1/2021 10:15 AM Karlee Chavez MD N Oncology [facility] - [facility]   7/19/2021 10:40 AM Rei Babcock DO [facility] received counseling on the following healthy behaviors: nutrition, exercise and medication adherence    Patient given educational materials on: See Attached    I have instructed uQyen Dudley to complete a self tracking handout on Blood Pressures  and instructed them to bring it with them to her next appointment. Barriers to learning and self management: none    Discussed use, benefit, and side effects of prescribed medications. Barriers to medication compliance addressed.   All patient questions answered. Pt voiced understanding.        Electronically signed by Annelise Hoff DO on 1/14/2021 at 11:04 AM

## 2021-01-14 ENCOUNTER — OFFICE VISIT (OUTPATIENT)
Dept: FAMILY MEDICINE CLINIC | Age: 71
End: 2021-01-14
Payer: MEDICARE

## 2021-01-14 VITALS
HEART RATE: 72 BPM | DIASTOLIC BLOOD PRESSURE: 74 MMHG | SYSTOLIC BLOOD PRESSURE: 128 MMHG | HEIGHT: 62 IN | TEMPERATURE: 98.1 F | BODY MASS INDEX: 34.04 KG/M2 | RESPIRATION RATE: 12 BRPM | WEIGHT: 185 LBS | OXYGEN SATURATION: 98 %

## 2021-01-14 DIAGNOSIS — R73.03 PREDIABETES: ICD-10-CM

## 2021-01-14 DIAGNOSIS — F33.42 RECURRENT MAJOR DEPRESSIVE DISORDER, IN FULL REMISSION (HCC): ICD-10-CM

## 2021-01-14 DIAGNOSIS — I10 ESSENTIAL (PRIMARY) HYPERTENSION: Primary | ICD-10-CM

## 2021-01-14 DIAGNOSIS — Z85.3 HISTORY OF BREAST CANCER: ICD-10-CM

## 2021-01-14 DIAGNOSIS — M85.89 OSTEOPENIA OF MULTIPLE SITES: ICD-10-CM

## 2021-01-14 DIAGNOSIS — D50.0 IRON DEFICIENCY ANEMIA DUE TO CHRONIC BLOOD LOSS: ICD-10-CM

## 2021-01-14 DIAGNOSIS — E66.9 OBESITY (BMI 30-39.9): ICD-10-CM

## 2021-01-14 DIAGNOSIS — C50.919 MALIGNANT NEOPLASM OF FEMALE BREAST, UNSPECIFIED ESTROGEN RECEPTOR STATUS, UNSPECIFIED LATERALITY, UNSPECIFIED SITE OF BREAST (HCC): ICD-10-CM

## 2021-01-14 DIAGNOSIS — F41.1 GAD (GENERALIZED ANXIETY DISORDER): ICD-10-CM

## 2021-01-14 DIAGNOSIS — E78.00 PURE HYPERCHOLESTEROLEMIA: ICD-10-CM

## 2021-01-14 PROCEDURE — 99214 OFFICE O/P EST MOD 30 MIN: CPT | Performed by: FAMILY MEDICINE

## 2021-01-14 NOTE — PATIENT INSTRUCTIONS

## 2021-01-25 DIAGNOSIS — Z85.3 HISTORY OF BREAST CANCER: ICD-10-CM

## 2021-01-25 DIAGNOSIS — M85.89 OSTEOPENIA OF MULTIPLE SITES: Chronic | ICD-10-CM

## 2021-01-25 RX ORDER — ALENDRONATE SODIUM 70 MG/1
TABLET ORAL
Qty: 12 TABLET | Refills: 3 | Status: SHIPPED | OUTPATIENT
Start: 2021-01-25 | End: 2021-07-29 | Stop reason: ALTCHOICE

## 2021-01-25 NOTE — TELEPHONE ENCOUNTER
Recent Visits  Date Type Provider Dept   01/14/21 Office Visit Kiersten Camarena, DO [facility]   07/14/20 Office Visit Kiersten Camarena, DO [facility]   01/14/20 Office Visit Kiersten Camarena, DO [facility]   Showing recent visits within past 540 days with a meds authorizing provider and meeting all other requirements     Future Appointments  No visits were found meeting these conditions.    Showing future appointments within next 150 days with a meds authorizing provider and meeting all other requirements

## 2021-03-30 ENCOUNTER — HOSPITAL ENCOUNTER (OUTPATIENT)
Dept: WOMENS IMAGING | Age: 71
Discharge: HOME | End: 2021-03-30
Payer: MEDICARE

## 2021-03-30 DIAGNOSIS — Z17.0 MALIGNANT NEOPLASM OF RIGHT BREAST IN FEMALE, ESTROGEN RECEPTOR POSITIVE, UNSPECIFIED SITE OF BREAST (HCC): ICD-10-CM

## 2021-03-30 DIAGNOSIS — D50.0 IRON DEFICIENCY ANEMIA DUE TO CHRONIC BLOOD LOSS: ICD-10-CM

## 2021-03-30 DIAGNOSIS — C50.911 MALIGNANT NEOPLASM OF RIGHT BREAST IN FEMALE, ESTROGEN RECEPTOR POSITIVE, UNSPECIFIED SITE OF BREAST (HCC): ICD-10-CM

## 2021-03-30 DIAGNOSIS — Z12.31 ENCOUNTER FOR SCREENING MAMMOGRAM FOR MALIGNANT NEOPLASM OF BREAST: ICD-10-CM

## 2021-03-30 DIAGNOSIS — M85.89 OSTEOPENIA OF MULTIPLE SITES: ICD-10-CM

## 2021-03-30 PROCEDURE — 77080 DXA BONE DENSITY AXIAL: CPT

## 2021-03-30 PROCEDURE — 77067 SCR MAMMO BI INCL CAD: CPT

## 2021-04-02 ENCOUNTER — HOSPITAL ENCOUNTER (OUTPATIENT)
Dept: WOMENS IMAGING | Age: 71
Discharge: HOME | End: 2021-04-02
Payer: MEDICARE

## 2021-04-02 DIAGNOSIS — R92.1 BREAST CALCIFICATION, LEFT: ICD-10-CM

## 2021-04-02 PROCEDURE — G0279 TOMOSYNTHESIS, MAMMO: HCPCS

## 2021-04-12 ENCOUNTER — HOSPITAL ENCOUNTER (OUTPATIENT)
Dept: WOMENS IMAGING | Age: 71
Discharge: HOME | End: 2021-04-12
Payer: MEDICARE

## 2021-04-12 DIAGNOSIS — R92.1 CALCIFICATION OF LEFT BREAST: ICD-10-CM

## 2021-04-12 DIAGNOSIS — R92.1 BREAST CALCIFICATIONS: ICD-10-CM

## 2021-04-12 PROCEDURE — 77065 DX MAMMO INCL CAD UNI: CPT

## 2021-04-12 PROCEDURE — 88305 TISSUE EXAM BY PATHOLOGIST: CPT

## 2021-04-12 PROCEDURE — 2709999900 MAM STEREO BREAST BX W LOC DEVICE 1ST LESION LEFT

## 2021-04-12 NOTE — PROGRESS NOTES
Formulation and discussion of sedation / procedure plans, risks, benefits, side effects and alternatives with patient and/or responsible adult completed.     Electronically signed by Finley Angelucci, MD on 4/12/2021 at 1:14 PM

## 2021-04-12 NOTE — PROGRESS NOTES
[facility] [address]  Pre-Biopsy Assessment      Patient Education    Written information about procedure Yes  left   Procedural steps explained Yes Stereotactic Biopsy   Post-op potential: bruising, hematoma, pain Yes    Self-care: activity, care of dressing Yes    Patient verbalized understanding Yes    Consent signed and witnessed Yes      Hormone Therapy Status: n/a    Recent Medication: Aspirin Last Dose: 4-10-21                                     Hormone Replacement Therapy: no    Previous Breast Biopsy: yes - 2014    Previous Diagnosis Cancer: yes - breast cancer right 2014    Hysterectomy:no    Emotional Status: Calm    Language or Physical Barriers: n/a    Comments: n/a      Electronically signed by Amanda Morales on 4/12/2021 at 1:21 PM

## 2021-04-12 NOTE — PROGRESS NOTES
Breast Biopsy Flowsheet/Post-Operative Care    Date of Procedure: 4/12/2021  Physician: Dr. Ruth Connor   Technologist: Nica Cuellar RT(R)(M)    Biopsy:stereotactic breast biopsy  Lesion type: Non-palpable  Breast: left    Clock face position: Site #1: LIQ         Primary Method of Detection: Mammogram      Microcalcifications   Distribution: Grouped        Biopsy Method:   Mammotome:    Site # 1    Gauge: 10    # of Passes: 5     Clip: Coil      Pre-Op Assessment: (BI-RADS)   4. Suspicious Abnormality    Patient Tolerated Procedure: good  Complications: n/a  Comments: n/a    Post Operative Care  Steri strips: Yes  Dressing: Gauze, Tape   Ice Applied to Site:  No  Evidence of Bleeding:  No    Pain Verbalized: No      Written Discharge Instructions: Yes  Condition at Discharge: good  Time of Discharge: 1347    Electronically signed by Timothy Knight on 4/12/2021 at 1:51 PM

## 2021-04-13 ENCOUNTER — CLINICAL DOCUMENTATION (OUTPATIENT)
Dept: WOMENS IMAGING | Age: 71
End: 2021-04-13

## 2021-04-13 NOTE — PROGRESS NOTES
Contact Type :    Telephone  Notes :  After consulting with Dr. Brisa Clayton was contacted by telephone. She was informed of the negative biopsy results and the need to return for a 6 month follow up. She voiced understanding. Biopsy site: A little sore, but fine. Results faxed to: Dr. Ian Marroquin and Dr. Emily Dawn.

## 2021-04-28 DIAGNOSIS — D50.0 IRON DEFICIENCY ANEMIA DUE TO CHRONIC BLOOD LOSS: Primary | ICD-10-CM

## 2021-04-29 ENCOUNTER — HOSPITAL ENCOUNTER (OUTPATIENT)
Dept: INFUSION THERAPY | Age: 71
Discharge: HOME | End: 2021-04-29
Payer: MEDICARE

## 2021-04-29 ENCOUNTER — OFFICE VISIT (OUTPATIENT)
Dept: ONCOLOGY | Age: 71
End: 2021-04-29
Payer: MEDICARE

## 2021-04-29 VITALS
HEIGHT: 62 IN | WEIGHT: 191 LBS | HEART RATE: 88 BPM | OXYGEN SATURATION: 97 % | BODY MASS INDEX: 35.15 KG/M2 | DIASTOLIC BLOOD PRESSURE: 75 MMHG | SYSTOLIC BLOOD PRESSURE: 146 MMHG | RESPIRATION RATE: 18 BRPM | TEMPERATURE: 98.1 F

## 2021-04-29 DIAGNOSIS — D50.0 IRON DEFICIENCY ANEMIA DUE TO CHRONIC BLOOD LOSS: ICD-10-CM

## 2021-04-29 DIAGNOSIS — Z17.0 MALIGNANT NEOPLASM OF RIGHT BREAST IN FEMALE, ESTROGEN RECEPTOR POSITIVE, UNSPECIFIED SITE OF BREAST (HCC): Primary | ICD-10-CM

## 2021-04-29 DIAGNOSIS — Z12.31 ENCOUNTER FOR SCREENING MAMMOGRAM FOR MALIGNANT NEOPLASM OF BREAST: ICD-10-CM

## 2021-04-29 DIAGNOSIS — C50.911 MALIGNANT NEOPLASM OF RIGHT BREAST IN FEMALE, ESTROGEN RECEPTOR POSITIVE, UNSPECIFIED SITE OF BREAST (HCC): Primary | ICD-10-CM

## 2021-04-29 LAB
ABSOLUTE IMMATURE GRANULOCYTE: 0.03 THOU/MM3 (ref 0–0.07)
BASINOPHIL, AUTOMATED: 1 % (ref 0–3)
BASOPHILS ABSOLUTE: 0.1 THOU/MM3 (ref 0–0.1)
EOSINOPHILS ABSOLUTE: 0.1 THOU/MM3 (ref 0–0.4)
EOSINOPHILS RELATIVE PERCENT: 2 % (ref 0–4)
HCT VFR BLD CALC: 44 % (ref 37–47)
HEMOGLOBIN: 14.5 GM/DL (ref 12–16)
IMMATURE GRANULOCYTES: 1 %
LYMPHOCYTES # BLD: 28 % (ref 15–47)
LYMPHOCYTES ABSOLUTE: 1.7 THOU/MM3 (ref 1–4.8)
MCH RBC QN AUTO: 30.7 PG (ref 26–33)
MCHC RBC AUTO-ENTMCNC: 33 GM/DL (ref 32.2–35.5)
MCV RBC AUTO: 93 FL (ref 81–99)
MONOCYTES ABSOLUTE: 0.5 THOU/MM3 (ref 0.4–1.3)
MONOCYTES: 9 % (ref 0–12)
PDW BLD-RTO: 12.1 % (ref 11.5–14.5)
PLATELET # BLD: 264 THOU/MM3 (ref 130–400)
PMV BLD AUTO: 9.8 FL (ref 9.4–12.4)
RBC # BLD: 4.72 MILL/MM3 (ref 4.2–5.4)
SEG NEUTROPHILS: 60 % (ref 43–75)
SEGMENTED NEUTROPHILS ABSOLUTE COUNT: 3.6 THOU/MM3 (ref 1.8–7.7)
WBC # BLD: 6 THOU/MM3 (ref 4.8–10.8)

## 2021-04-29 PROCEDURE — 85025 COMPLETE CBC W/AUTO DIFF WBC: CPT

## 2021-04-29 PROCEDURE — 99211 OFF/OP EST MAY X REQ PHY/QHP: CPT

## 2021-04-29 PROCEDURE — 36415 COLL VENOUS BLD VENIPUNCTURE: CPT

## 2021-04-29 PROCEDURE — 99213 OFFICE O/P EST LOW 20 MIN: CPT | Performed by: PHYSICIAN ASSISTANT

## 2021-04-29 NOTE — PATIENT INSTRUCTIONS
1. Return to clinic in one year with Dr. Nando Ma  2. Please schedule 6 month follow up mammogram from 4/12/21 (order placed per Dr. Paulo Aguirre). 3. Annual bilateral mammogram in April 2022  4. Please call for questions or concerns.

## 2021-04-29 NOTE — PROGRESS NOTES
[facility], [address] [facility],[address]  [address] [address]  Dept: [phone]  Dept Fax: [phone]: [phone]      Visit Date:4/29/2021     Alex Shrestha is a 65 y.o. female who presents today for:   Chief Complaint   Patient presents with    Follow-up     Malignant neoplasm of right breast in female, estrogen receptor positive, unspecified site of breast     Results     Rv Scans        HPI:   Alex Shrestha is a 65 y.o. female followed by Dr. Paloma Sosa for history breast cancer. Per Dr. Paloma Sosa' note on 4/16/2020: The patient underwent a routine mammogram on 02/03/2014 which revealed an abnormality. She returned for a follow up study on 04/03/2014 which included an ultrasound that was suspicious of malignancy. A biopsy confirmed a grade 2 infiltrating ductal carcinoma in the right breast. The malignancy was estrogen receptor positive (100%), progesterone receptor positive (90%) and HER-2/julienne overexpression was negative. A lumpectomy with sentinel lymph node biopsy was completed on 04/24/2014. Surgical pathology revealed a tumor measuring 0.4 cm with four negative sentinel lymph nodes. The patient underwent right breast radiation from 06/09/2014 through 07/20/2014. She started to Anastrozole therapy on 07/30/2014. Interval History 4/29/2021:   The patient is here for follow up evaluation of history of breast cancer. She was followed previously for iron deficiency anemia as well which improved with oral iron supplementation. Patient had annual mammogram completed on 3/30/2021 showing a cluster of punctate calcifications within the left breast posterior depth 6 o'clock position recommend magnification views of left breast as well as left breast.  The patient underwent stereotactic biopsy on 4/12/2021. Biopsy pathology was consistent with fibroadenoma with microcalcifications, 2 mm in greatest dimension. Negative for malignancy.   A 6-month follow-up mammogram was 01/06/2021    CO2 26 01/06/2021    BUN 17 01/06/2021    CREATININE 0.6 01/06/2021    GLUCOSE 139 01/06/2021    GLUCOSE 148 11/06/2019    CALCIUM 9.8 01/06/2021      LFT:   Lab Results   Component Value Date    ALT 49 01/06/2021    AST 42 (H) 01/06/2021    ALKPHOS 89 01/06/2021    BILITOT 0.5 01/06/2021         Assessment and Plan:   1. History of Right Breast Cancer - ER positive, WA positive, HER-2/julienne negative  S/P lumpectomy with sentinel lymph node biopsy. She completed adjuvant radiation and 5 years of AI therapy with Arimidex.    -Monitor for recurrence of malignancy   -continue annual mammogram - 6 month follow up mammogram to be completed in October 2021, annual bilateral mammogram April 2022  2. History of Iron Deficiency Anemia  Resolved. Hgb 14.5, hct 44, MCV 93. RTC in one year with Dr. Brennan Barillas    All patient questions answered. Pt voiced understanding. Patient agreed with treatment plan. Follow up as directed. Patient instructed to call for questions or concerns.           Electronically signed by   Niki Austin PA-C

## 2021-05-02 DIAGNOSIS — F33.0 MAJOR DEPRESSIVE DISORDER, RECURRENT EPISODE, MILD (HCC): ICD-10-CM

## 2021-05-02 DIAGNOSIS — F41.9 ANXIETY: ICD-10-CM

## 2021-05-03 RX ORDER — CITALOPRAM 20 MG/1
TABLET ORAL
Qty: 90 TABLET | Refills: 3 | Status: SHIPPED | OUTPATIENT
Start: 2021-05-03 | End: 2022-02-01

## 2021-06-18 ENCOUNTER — TELEPHONE (OUTPATIENT)
Dept: FAMILY MEDICINE CLINIC | Age: 71
End: 2021-06-18

## 2021-06-18 DIAGNOSIS — R73.9 HYPERGLYCEMIA: ICD-10-CM

## 2021-06-18 DIAGNOSIS — R73.03 PREDIABETES: Primary | ICD-10-CM

## 2021-06-18 NOTE — TELEPHONE ENCOUNTER
Pt due for fasting labs prior to next apt on 7/27/2021. Please call to have pt complete this. Thanks! ASSESSMENT & PLAN   Diagnosis Orders   1. Prediabetes  Glucose, random    Hemoglobin A1C   2.  Hyperglycemia  Glucose, random    Hemoglobin A1C     Future Appointments   Date Time Provider Ramses Melissa   7/27/2021 10:40 AM Paola Martin DO [address] [place] [address]   4/25/2022 10:20 AM STR MAMMOGRAPHY RM2  [facility] WOMEN STR Radiolog   4/28/2022 11:45 AM Tylor Gan MD  [facility]

## 2021-06-22 DIAGNOSIS — F41.1 GAD (GENERALIZED ANXIETY DISORDER): Chronic | ICD-10-CM

## 2021-06-22 RX ORDER — BUSPIRONE HYDROCHLORIDE 10 MG/1
TABLET ORAL
Qty: 180 TABLET | Refills: 3 | Status: SHIPPED | OUTPATIENT
Start: 2021-06-22 | End: 2022-06-06

## 2021-06-22 NOTE — TELEPHONE ENCOUNTER
Recent Visits  Date Type Provider Dept   01/14/21 Office Visit Nayla Hauser DO [facility]   07/14/20 Office Visit Nayla Hauser DO [facility]   01/14/20 Office Visit Nayla Hauser DO [facility]   Showing recent visits within past 540 days with a meds authorizing provider and meeting all other requirements  Future Appointments  Date Type Provider Dept   07/27/21 Appointment Nayla Hauser DO [facility]   Showing future appointments within next 150 days with a meds authorizing provider and meeting all other requirements    Future Appointments   Date Time Provider Ramses Melissa   7/27/2021 10:40 AM [facility]   4/25/2022 10:20 AM STR MAMMOGRAPHY RM2  [facility] Bodo WOMEN STR Radiolog   4/28/2022 11:45 AM Pepe Burrows MD  Oncology [facility] - [facility]

## 2021-07-24 LAB
AVERAGE GLUCOSE: 128 MG/DL
GLUCOSE: 139 MG/DL (ref 65–99)
HBA1C MFR BLD: 6.1 % (ref 4.4–6.4)

## 2021-07-26 ENCOUNTER — TELEPHONE (OUTPATIENT)
Dept: FAMILY MEDICINE CLINIC | Age: 71
End: 2021-07-26

## 2021-07-26 NOTE — TELEPHONE ENCOUNTER
----- Message from Rennie Rinne, DO sent at 7/24/2021  2:34 PM EDT -----  Please let pt know that blood sugar stable in the prediabetic range  Will d/w her at her f/u visit on 7/27  Let me know if questions, thanks!

## 2021-07-27 ENCOUNTER — OFFICE VISIT (OUTPATIENT)
Dept: FAMILY MEDICINE CLINIC | Age: 71
End: 2021-07-27
Payer: MEDICARE

## 2021-07-27 VITALS
HEIGHT: 62 IN | BODY MASS INDEX: 34.52 KG/M2 | OXYGEN SATURATION: 96 % | SYSTOLIC BLOOD PRESSURE: 122 MMHG | HEART RATE: 86 BPM | RESPIRATION RATE: 10 BRPM | TEMPERATURE: 98.1 F | WEIGHT: 187.6 LBS | DIASTOLIC BLOOD PRESSURE: 78 MMHG

## 2021-07-27 DIAGNOSIS — F41.1 GAD (GENERALIZED ANXIETY DISORDER): ICD-10-CM

## 2021-07-27 DIAGNOSIS — D50.0 IRON DEFICIENCY ANEMIA DUE TO CHRONIC BLOOD LOSS: ICD-10-CM

## 2021-07-27 DIAGNOSIS — Z00.00 ROUTINE GENERAL MEDICAL EXAMINATION AT A HEALTH CARE FACILITY: Primary | ICD-10-CM

## 2021-07-27 DIAGNOSIS — R73.03 PREDIABETES: ICD-10-CM

## 2021-07-27 DIAGNOSIS — I10 ESSENTIAL (PRIMARY) HYPERTENSION: ICD-10-CM

## 2021-07-27 DIAGNOSIS — E78.00 PURE HYPERCHOLESTEROLEMIA: ICD-10-CM

## 2021-07-27 DIAGNOSIS — C50.919 MALIGNANT NEOPLASM OF FEMALE BREAST, UNSPECIFIED ESTROGEN RECEPTOR STATUS, UNSPECIFIED LATERALITY, UNSPECIFIED SITE OF BREAST (HCC): ICD-10-CM

## 2021-07-27 DIAGNOSIS — M81.0 OSTEOPOROSIS, POST-MENOPAUSAL: Chronic | ICD-10-CM

## 2021-07-27 DIAGNOSIS — Z85.3 HISTORY OF BREAST CANCER: ICD-10-CM

## 2021-07-27 DIAGNOSIS — E66.9 OBESITY (BMI 30-39.9): ICD-10-CM

## 2021-07-27 DIAGNOSIS — F33.42 RECURRENT MAJOR DEPRESSIVE DISORDER, IN FULL REMISSION (HCC): ICD-10-CM

## 2021-07-27 PROCEDURE — 99214 OFFICE O/P EST MOD 30 MIN: CPT | Performed by: FAMILY MEDICINE

## 2021-07-27 PROCEDURE — G0439 PPPS, SUBSEQ VISIT: HCPCS | Performed by: FAMILY MEDICINE

## 2021-07-27 SDOH — ECONOMIC STABILITY: TRANSPORTATION INSECURITY
IN THE PAST 12 MONTHS, HAS LACK OF TRANSPORTATION KEPT YOU FROM MEETINGS, WORK, OR FROM GETTING THINGS NEEDED FOR DAILY LIVING?: NO

## 2021-07-27 SDOH — ECONOMIC STABILITY: HOUSING INSECURITY
IN THE LAST 12 MONTHS, WAS THERE A TIME WHEN YOU DID NOT HAVE A STEADY PLACE TO SLEEP OR SLEPT IN A SHELTER (INCLUDING NOW)?: NO

## 2021-07-27 SDOH — ECONOMIC STABILITY: FOOD INSECURITY: WITHIN THE PAST 12 MONTHS, YOU WORRIED THAT YOUR FOOD WOULD RUN OUT BEFORE YOU GOT MONEY TO BUY MORE.: NEVER TRUE

## 2021-07-27 SDOH — ECONOMIC STABILITY: FOOD INSECURITY: WITHIN THE PAST 12 MONTHS, THE FOOD YOU BOUGHT JUST DIDN'T LAST AND YOU DIDN'T HAVE MONEY TO GET MORE.: NEVER TRUE

## 2021-07-27 SDOH — ECONOMIC STABILITY: TRANSPORTATION INSECURITY
IN THE PAST 12 MONTHS, HAS THE LACK OF TRANSPORTATION KEPT YOU FROM MEDICAL APPOINTMENTS OR FROM GETTING MEDICATIONS?: NO

## 2021-07-27 SDOH — ECONOMIC STABILITY: INCOME INSECURITY: IN THE LAST 12 MONTHS, WAS THERE A TIME WHEN YOU WERE NOT ABLE TO PAY THE MORTGAGE OR RENT ON TIME?: NO

## 2021-07-27 ASSESSMENT — LIFESTYLE VARIABLES
AUDIT-C TOTAL SCORE: INCOMPLETE
AUDIT TOTAL SCORE: INCOMPLETE
HOW OFTEN DO YOU HAVE A DRINK CONTAINING ALCOHOL: 0
HOW OFTEN DO YOU HAVE A DRINK CONTAINING ALCOHOL: NEVER

## 2021-07-27 ASSESSMENT — PATIENT HEALTH QUESTIONNAIRE - PHQ9
SUM OF ALL RESPONSES TO PHQ QUESTIONS 1-9: 0
SUM OF ALL RESPONSES TO PHQ QUESTIONS 1-9: 0
1. LITTLE INTEREST OR PLEASURE IN DOING THINGS: 0
1. LITTLE INTEREST OR PLEASURE IN DOING THINGS: 0
SUM OF ALL RESPONSES TO PHQ QUESTIONS 1-9: 0
SUM OF ALL RESPONSES TO PHQ9 QUESTIONS 1 & 2: 0
2. FEELING DOWN, DEPRESSED OR HOPELESS: 0
SUM OF ALL RESPONSES TO PHQ QUESTIONS 1-9: 0
SUM OF ALL RESPONSES TO PHQ QUESTIONS 1-9: 0
SUM OF ALL RESPONSES TO PHQ9 QUESTIONS 1 & 2: 0
SUM OF ALL RESPONSES TO PHQ QUESTIONS 1-9: 0
2. FEELING DOWN, DEPRESSED OR HOPELESS: 0

## 2021-07-27 ASSESSMENT — SOCIAL DETERMINANTS OF HEALTH (SDOH): HOW HARD IS IT FOR YOU TO PAY FOR THE VERY BASICS LIKE FOOD, HOUSING, MEDICAL CARE, AND HEATING?: NOT HARD AT ALL

## 2021-07-27 NOTE — PATIENT INSTRUCTIONS
Personalized Preventive Plan for Kaitlin Banuelos - 7/27/2021  Medicare offers a range of preventive health benefits. Some of the tests and screenings are paid in full while other may be subject to a deductible, co-insurance, and/or copay. Some of these benefits include a comprehensive review of your medical history including lifestyle, illnesses that may run in your family, and various assessments and screenings as appropriate. After reviewing your medical record and screening and assessments performed today your provider may have ordered immunizations, labs, imaging, and/or referrals for you. A list of these orders (if applicable) as well as your Preventive Care list are included within your After Visit Summary for your review. Other Preventive Recommendations:    · A preventive eye exam performed by an eye specialist is recommended every 1-2 years to screen for glaucoma; cataracts, macular degeneration, and other eye disorders. · A preventive dental visit is recommended every 6 months. · Try to get at least 150 minutes of exercise per week or 10,000 steps per day on a pedometer . · Order or download the FREE \"Exercise & Physical Activity: Your Everyday Guide\" from [employer]. Call 9-434.708.6300 or search [employer] online. · You need 3652-4363 mg of calcium and 2313-9392 IU of vitamin D per day. It is possible to meet your calcium requirement with diet alone, but a vitamin D supplement is usually necessary to meet this goal.  · When exposed to the sun, use a sunscreen that protects against both UVA and UVB radiation with an SPF of 30 or greater. Reapply every 2 to 3 hours or after sweating, drying off with a towel, or swimming. · Always wear a seat belt when traveling in a car. Always wear a helmet when riding a bicycle or motorcycle.

## 2021-07-29 ENCOUNTER — TELEPHONE (OUTPATIENT)
Dept: FAMILY MEDICINE CLINIC | Age: 71
End: 2021-07-29

## 2021-07-29 DIAGNOSIS — M81.0 OSTEOPOROSIS, POST-MENOPAUSAL: Primary | ICD-10-CM

## 2021-07-29 NOTE — TELEPHONE ENCOUNTER
Please let pt know that I spoke with Dr. María Elena Segura and he is onboard with the change to prolia from Fosamax    We'll  Need to get it authorized and then we can do the injection in the office    Let me know if questions, thanks! Diagnosis Orders   1.  Osteoporosis, post-menopausal  denosumab (PROLIA) 60 MG/ML SOSY SC injection       Medications Discontinued During This Encounter   Medication Reason    alendronate (FOSAMAX) 70 MG tablet Alternate therapy

## 2021-08-02 ENCOUNTER — TELEPHONE (OUTPATIENT)
Dept: FAMILY MEDICINE CLINIC | Age: 71
End: 2021-08-02

## 2021-08-02 NOTE — TELEPHONE ENCOUNTER
PEEWEE MIN PROLIA  60MG ( PART B ) 08/02/21    Was denied through part B (see attached for details )     Called pharmacy states it has been sent though Part D and is being worked on by Donte .

## 2021-08-02 NOTE — TELEPHONE ENCOUNTER
----- Message from [facility] sent at 8/2/2021  9:38 AM EDT -----  Subject: Medication Problem    QUESTIONS  Name of Medication? denosumab (PROLIA) 60 MG/ML SOSY SC injection  Patient-reported dosage and instructions? 60 MG 1 every 6 months  What question or problem do you have with the medication? Freddy Dorantes from   [address] [place] [address] wants to confirm that the pre authorization was   received for this medicine from Dr. Aman Pruett office. She states the   pre-authorization was faxed on 7/29/21. Preferred Pharmacy? [id number] - [place], [address] [phone]  Pharmacy phone number (if available)? [phone]  Additional Information for Provider? Please call Freddy Dorantes at [phone]   reference# [id number] store number with information in regarding this   pre-authorization. If not able to speak with Freddy Dorantes anyone at [facility] [address]   can take care of the pre-authorization for this medicine.   ---------------------------------------------------------------------------  --------------  CALL BACK INFO  What is the best way for the office to contact you? OK to leave message on   voicemail  Preferred Call Back Phone Number? [phone]  ---------------------------------------------------------------------------  --------------  SCRIPT ANSWERS  Relationship to Patient? Third Party  Representative Name?  [employer]

## 2021-08-02 NOTE — TELEPHONE ENCOUNTER
Spoke to Kennedy Camilo with [facility] at the Previously documented phone number [phone] reference# [id number]. She states they just were calling to assure the PA was received, and started. Informed Kennedy Camilo that the PA was faxed to the insurance plan on Friday 7/30/21.

## 2021-08-09 ENCOUNTER — TELEPHONE (OUTPATIENT)
Dept: FAMILY MEDICINE CLINIC | Age: 71
End: 2021-08-09

## 2021-08-09 NOTE — TELEPHONE ENCOUNTER
----- Message from Estrellita Sagastume, [address] sent at 8/6/2021  1:37 PM EDT -----  Subject: Message to Provider    QUESTIONS  Information for Provider? [facility] called stating that they   faxed a prior auth for her Prolia. [facility] would like to know if the   office has received and if the office will complete the Prior Auth so that   the pt can start her medication. Rx# [id number] store number [phone]  ---------------------------------------------------------------------------  --------------  CALL BACK INFO  What is the best way for the office to contact you? Do not leave any   message, patient will call back for answer  Preferred Call Back Phone Number? [phone]  ---------------------------------------------------------------------------  --------------  SCRIPT ANSWERS  Relationship to Patient? Third Party  Representative Name?  [employer]

## 2021-08-11 DIAGNOSIS — E78.00 PURE HYPERCHOLESTEROLEMIA: Chronic | ICD-10-CM

## 2021-08-11 RX ORDER — ATORVASTATIN CALCIUM 20 MG/1
TABLET, FILM COATED ORAL
Qty: 90 TABLET | Refills: 3 | Status: SHIPPED | OUTPATIENT
Start: 2021-08-11 | End: 2022-05-03

## 2021-08-11 NOTE — TELEPHONE ENCOUNTER
Recent Visits  Date Type Provider Dept   07/27/21 Office Visit Mando Polk, DO [facility]   01/14/21 Office Visit Mando Polk, DO [facility]   07/14/20 Office Visit Mando Polk, DO [facility]   Showing recent visits within past 540 days with a meds authorizing provider and meeting all other requirements  Future Appointments  No visits were found meeting these conditions.   Showing future appointments within next 150 days with a meds authorizing provider and meeting all other requirements    Future Appointments   Date Time Provider Ramses Melissa   1/27/2022 11:00 AM Mando Polk DO [facility]   4/25/2022 10:20 AM STR MAMMOGRAPHY RM2  [facility] WOMEN STR Radiolog   4/28/2022 11:45 AM Eleanor Grossman MD  Oncology [facility] - [facility] [address]

## 2021-10-04 RX ORDER — VALSARTAN AND HYDROCHLOROTHIAZIDE 320; 25 MG/1; MG/1
TABLET, FILM COATED ORAL
Qty: 90 TABLET | Refills: 3 | Status: SHIPPED | OUTPATIENT
Start: 2021-10-04 | End: 2022-09-12

## 2021-11-16 ENCOUNTER — HOSPITAL ENCOUNTER (OUTPATIENT)
Dept: WOMENS IMAGING | Age: 71
Discharge: HOME | End: 2021-11-16
Payer: MEDICARE

## 2021-11-16 ENCOUNTER — TELEPHONE (OUTPATIENT)
Dept: FAMILY MEDICINE CLINIC | Age: 71
End: 2021-11-16

## 2021-11-16 DIAGNOSIS — R92.1 BREAST CALCIFICATIONS: ICD-10-CM

## 2021-11-16 DIAGNOSIS — Z09 FOLLOW-UP EXAM: ICD-10-CM

## 2021-11-16 PROCEDURE — G0279 TOMOSYNTHESIS, MAMMO: HCPCS

## 2021-11-16 NOTE — TELEPHONE ENCOUNTER
----- Message from Connie Ellis, DO sent at 11/16/2021  2:39 PM EST -----  Please let pt know that f/u L mammogram is normal  Return to routine annual screening  Let me know if questions, thanks!

## 2021-12-30 ENCOUNTER — TELEPHONE (OUTPATIENT)
Dept: FAMILY MEDICINE CLINIC | Age: 71
End: 2021-12-30

## 2021-12-30 DIAGNOSIS — I10 ESSENTIAL (PRIMARY) HYPERTENSION: Primary | Chronic | ICD-10-CM

## 2021-12-30 DIAGNOSIS — R73.9 HYPERGLYCEMIA: ICD-10-CM

## 2021-12-30 DIAGNOSIS — R73.03 PREDIABETES: Chronic | ICD-10-CM

## 2021-12-30 NOTE — TELEPHONE ENCOUNTER
Pt due for fasting labs prior to next apt on 1/27/2022. Please call to have pt complete this. Thanks! ASSESSMENT & PLAN   Diagnosis Orders   1. Essential (primary) hypertension  CBC Auto Differential    Comprehensive Metabolic Panel    Hemoglobin A1C    Lipid Panel    Microalbumin / Creatinine Urine Ratio    TSH with Reflex   2. Prediabetes  CBC Auto Differential    Comprehensive Metabolic Panel    Hemoglobin A1C    Lipid Panel    Microalbumin / Creatinine Urine Ratio    TSH with Reflex   3.  Hyperglycemia  CBC Auto Differential    Comprehensive Metabolic Panel    Hemoglobin A1C    Lipid Panel    Microalbumin / Creatinine Urine Ratio    TSH with Reflex     Future Appointments   Date Time Provider Ramses Patinoi   1/27/2022 11:00 AM [address]   4/25/2022 10:20 AM [facility]HY [facility]   4/28/2022  3:20 PM Brennan Rodríguez MD N [facility]

## 2022-01-20 LAB
ABSOLUTE BASO #: 0 X10E9/L (ref 0–0.2)
ABSOLUTE EOS #: 0.1 X10E9/L (ref 0–0.4)
ABSOLUTE LYMPH #: 1.9 X10E9/L (ref 1–3.5)
ABSOLUTE MONO #: 0.4 X10E9/L (ref 0–0.9)
ABSOLUTE NEUT #: 3.2 X10E9/L (ref 1.5–6.6)
ALBUMIN SERPL-MCNC: 4.6 G/DL (ref 3.2–5.3)
ALK PHOSPHATASE: 73 U/L (ref 39–130)
ALT SERPL-CCNC: 47 U/L (ref 0–31)
ANION GAP SERPL CALCULATED.3IONS-SCNC: 13 MMOL/L (ref 5–15)
AST SERPL-CCNC: 32 U/L (ref 0–41)
AVERAGE GLUCOSE: 128 MG/DL
BASOPHILS RELATIVE PERCENT: 0.8 %
BILIRUB SERPL-MCNC: 0.7 MG/DL (ref 0.3–1.2)
BUN BLDV-MCNC: 16 MG/DL (ref 5–27)
CALCIUM SERPL-MCNC: 9.6 MG/DL (ref 8.5–10.5)
CHLORIDE BLD-SCNC: 102 MMOL/L (ref 98–109)
CHOLESTEROL/HDL RATIO: 3.2 (ref 1–5)
CHOLESTEROL: 166 MG/DL (ref 150–200)
CO2: 25 MMOL/L (ref 22–32)
CREAT SERPL-MCNC: 0.59 MG/DL (ref 0.4–1)
CREATINE, URINE: 215.47 MG/DL
EGFR AFRICAN AMERICAN: >60 ML/MIN/1.73SQ.M
EGFR IF NONAFRICAN AMERICAN: >60 ML/MIN/1.73SQ.M
EOSINOPHILS RELATIVE PERCENT: 1.7 %
GLUCOSE: 139 MG/DL (ref 65–99)
HBA1C MFR BLD: 6.1 % (ref 4.4–6.4)
HCT VFR BLD CALC: 45.2 % (ref 35–47)
HDLC SERPL-MCNC: 52 MG/DL
HEMOGLOBIN: 15.3 G/DL (ref 11.7–15.5)
LDL CHOLESTEROL CALCULATED: 79 MG/DL
LDL/HDL RATIO: 1.5
LYMPHOCYTE %: 33.8 %
MCH RBC QN AUTO: 31.2 PG (ref 27–34)
MCHC RBC AUTO-ENTMCNC: 33.9 G/DL (ref 32–36)
MCV RBC AUTO: 92 FL (ref 80–100)
MICROALBUMIN/CREAT 24H UR: 2.6 MG/DL (ref 0–1.9)
MICROALBUMIN/CREAT UR-RTO: 12.1 MG/G CREAT (ref 0–30)
MONOCYTES # BLD: 7.2 %
NEUTROPHILS RELATIVE PERCENT: 56.5 %
PDW BLD-RTO: 12.9 % (ref 11.5–15)
PLATELETS: 267 X10E9/L (ref 150–450)
PMV BLD AUTO: 9.2 FL (ref 7–12)
POTASSIUM SERPL-SCNC: 3.7 MMOL/L (ref 3.5–5)
RBC: 4.91 X10E12/L (ref 3.8–5.2)
SODIUM BLD-SCNC: 140 MMOL/L (ref 134–146)
TOTAL PROTEIN: 7.3 G/DL (ref 6–8)
TRIGL SERPL-MCNC: 173 MG/DL (ref 27–150)
TSH SERPL DL<=0.05 MIU/L-ACNC: 2.24 UIU/ML (ref 0.49–4.67)
VLDLC SERPL CALC-MCNC: 35 MG/DL (ref 0–30)
WBC: 5.7 X10E9/L (ref 4–11)

## 2022-01-21 ENCOUNTER — TELEPHONE (OUTPATIENT)
Dept: FAMILY MEDICINE CLINIC | Age: 72
End: 2022-01-21

## 2022-01-21 NOTE — TELEPHONE ENCOUNTER
----- Message from Nena Quiroz DO sent at 1/20/2022  7:20 PM EST -----  Please let pt know that labs are stable and appropriate. Let me know if questions, thanks!

## 2022-01-26 NOTE — PROGRESS NOTES
Chief Complaint   Patient presents with    Follow-up     Chronic issues as noted below    Shoulder Pain     Left Shoulder       History obtained from the patient. SUBJECTIVE:  Kamini Foote is a 66 y.o. female that presents today for     -HTN:    HPI:     Taking meds as prescribed ?: yes  Tolerating well ?: yes  Side Effects ?: denies  BP at home ?: <140/90  Working on TLCS ?: yes  Chest Pain/SOB/Palpitations? denies    BP Readings from Last 3 Encounters:   01/27/22 132/74   07/27/21 122/78   04/29/21 (!) 146/75       -PreDM PRIOR VISIT: wts going up d/t estrogen blocker. Due for labs. Trying to watch diet. UPDATE PRIOR VISIT: wts down a few labs. Had labs done, those are pending. UPDATE PRIOR VISIT: wts up a bit. Higher d/t estrogen blockers. Trying to make diet changes. a1c stable. UPDATE PRIOR VISIT: wts stable. Labs a little worse. On meformin. Working on life style changes. UPDATE PRIOR VISIT: wts improved. Labs stable. On metformin. Working on life style changes. UPDATE PRIOr VISIT:   Labs stable  wts about the same  On metfromin  Working on life style changes     UPDATE PRIOR VISIT:   Due for labs  On metformin  Working on lifestyle changes     UPDATE TODAY:   Labs stable  On metformin yet   Doing lifestyle changes yet    Lab Results   Component Value Date    LABA1C 6.1 01/19/2022    LABA1C 6.1 07/23/2021    LABA1C 5.7 01/06/2021    LABA1C 6.3 11/06/2019    LABA1C 6.1 04/26/2019    LABA1C 6.4 10/23/2018     Wt Readings from Last 3 Encounters:   01/27/22 191 lb (86.6 kg)   07/27/21 187 lb 9.6 oz (85.1 kg)   04/29/21 191 lb (86.6 kg)       -HLD:    HPI:    Taking meds as prescribed ?: yes  Tolerating well ?: yes  Side Effects ?: denies  Muscle Pain?: denies  Working on TLCS ?: yes      -Depression/Anxiety: depression stable on celexa and buspar, but anxiety is worse d/t having sister living with her. Denies SI/HI. Doesn't want to adjust meds.        -Osteoporosis:    Was on alendronate, on for ~ 7 years  Last dexa changed from osteopenia to osteoporosis  Was on anastrozolefor breast CA for 5 years  No falls or fxrs  Wrote for prolia, but had issues with approval      -TIFFANY PRIOr VISIT:  On iron   Saw GI, neg w/u  Saw heme, neg w/u  Due for f/u labs, has order  No bleeding, melena or hematochezia    UPDATE PRIOR VISIT:   Neg w/u with GI and heme  On iron   Cbc back to WNL  No abd pain  Denies bleeding,melena or hematochezia. UPDATE LAST VISIT:   Doing well  On iron once daily  Labs improved  No bleeding, melena or hematochezia     UPDATE TODAY:   On iron once daily  Labs stable  No bleeding, melena or hematochezia       -Hx of Breast CA:  Under went R lumpectomy 2014  Seeing Oncology at [facility]  Abnormal PATEL in spring 2021 with neg bx  NEG PATEL NOV 2021  No breast changes or pain        -L Shoulder Pain    HPI:    R hand dominant  Going on a few months  Better at rest  Worse with movement and laying on the shoulder  No injury that she can recall    Inciting injury or history of trauma? No  Pain is relieved by - rest  Pain is aggravated by - as above  Radiation of the pain? No  Paresthesias of the extremities? No  Decreased ROM? Yes  Treatments tried - none      Age/Gender Health Maintenance    Lipid - ; LDL 76; HDL 50;  (OCT 2018)  TC  177;  LDL 86; HDL 60;  (OCT 2017)  ; LDL 91; HDL 60;  (JAN 2016)    Lab Results   Component Value Date    CHOL 166 01/19/2022    CHOL 170 01/06/2021    CHOL 159 11/06/2019     Lab Results   Component Value Date    TRIG 173 (H) 01/19/2022    TRIG 188 01/06/2021    TRIG 129 11/06/2019     Lab Results   Component Value Date    HDL 52 01/19/2022    HDL 53 01/06/2021    HDL 54 11/06/2019     Lab Results   Component Value Date    LDLCALC 79 01/19/2022    LDLCALC 79 01/06/2021    LDLCALC 79 11/06/2019       DM Screen -   Lab Results   Component Value Date    GLUCOSE 139 01/19/2022     Lab Results   Component Value Date    LABA1C 6.1 01/19/2022    LABA1C 6.1 07/23/2021    LABA1C 5.7 01/06/2021    LABA1C 6.3 11/06/2019    LABA1C 6.1 04/26/2019    LABA1C 6.4 10/23/2018     123 with a1c 6.4 (OCT 2018)  114 (JAN 2016) with A1C 6.0 (JAN 2016)  Glucose: 134/A1C 6.1 (SEPT 2016)      Colon Cancer Screening - NEG June 2016, repeat 10 years/ NEG SEPT 2019 with hyperplastic polyp SEPT 2019, repeat 10 years (Dr. Satish Bedoya)   Sarah Carr (Age 52 to [de-identified] with 30 pack year hx, current smoker or quit within past 15 years) - never smoker    Tetanus - to get at pharmacy per medicare rules  Influenza Vaccine - UTD FALL 2021  Pneumonia Vaccine - UTD PCV 13 OCT 2016/PPV 23 in OCT 2018  Zoster - pt to get at pharmacy ([facility] [address] 2016)/to get at pharmacy per medicare rules    Breast Cancer Screening - Birads 3 JAN 2018, repeat 6 months, oncology managing. Rena Cutler 3 July 2018 on Left with repeat 6 months, oncology managing/NEG bilat dx [facility] MARCH 2019/neg mammo MARCH 2020/ABN PATEL Spring 2021 with neg bx. [facility] NOV 2021  Osteoporosis Screening - Osteopenia MAY 2014/with stable osteopniea JAN 2018 repeat 3 years/Osteoporosis [facility] 2021    Specialist List  Ortho: [facility]  GI: [place]      Current Outpatient Medications   Medication Sig Dispense Refill    denosumab (PROLIA) 60 MG/ML SOSY SC injection Inject 1 mL into the skin every 6 months 180 mL 1    valsartan-hydroCHLOROthiazide (DIOVAN-HCT) 320-25 MG per tablet TAKE 1 TABLET BY MOUTH EVERY DAY 90 tablet 3    atorvastatin (LIPITOR) 20 MG tablet TAKE 1 TABLET BY MOUTH DAILY 90 tablet 3    MULTIPLE VITAMIN PO Take by mouth daily      busPIRone (BUSPAR) 10 MG tablet TAKE 1 TABLET BY MOUTH TWICE DAILY 180 tablet 3    citalopram (CELEXA) 20 MG tablet TAKE 1 TABLET BY MOUTH EVERY DAY 90 tablet 3    NONFORMULARY Take 150 mg by mouth Indications: Iron pills  ordered by GI      metFORMIN (GLUCOPHAGE) 500 MG tablet TAKE 1 TABLET BY MOUTH TWICE DAILY WITH MEALS 180 tablet 3    Misc Natural Products (GLUCOSAMINE CHOND DOUBLE STR) TABS Take by mouth      vitamin D3 (COLECALCIFEROL) 400 UNITS TABS Take by mouth daily      aspirin 81 MG tablet Take 81 mg by mouth daily. No current facility-administered medications for this visit. Orders Placed This Encounter   Medications    denosumab (PROLIA) 60 MG/ML SOSY SC injection     Sig: Inject 1 mL into the skin every 6 months     Dispense:  180 mL     Refill:  1         All medications reviewed and reconciled, including OTC and herbal medications. Updated list given to patient. Patient Active Problem List    Diagnosis Date Noted    Iron deficiency anemia due to chronic blood loss 07/17/2019    Chronic anemia 12/05/2018    Obesity (BMI 30-39. 9)     Pure hypercholesterolemia 10/09/2016    Osteoarthritis of thoracolumbar spine      Following with Dr. Rosario Larson Prediabetes     Major depression     Osteoporosis, post-menopausal     Encounter for monitoring aromatase inhibitor therapy 11/10/2015    OA (osteoarthritis) 10/11/2014    Essential (primary) hypertension     ALECIA (generalized anxiety disorder)     Breast cancer ([facility] [address] 04/23/2014     S/p lumpectomy. Following with oncology, Dr. Jocelin Moreira. Past Medical History:   Diagnosis Date    Breast cancer ([facility] [address] 04/23/2014    Essential (primary) hypertension     ALECIA (generalized anxiety disorder)     History of hip replacement     History of therapeutic radiation     Major depression     Morbid obesity ([address]     OA (osteoarthritis) 10/11/2014    Obesity (BMI 30-39. 9)     Osteoarthritis of thoracolumbar spine     Osteoporosis, post-menopausal     Prediabetes     Pure hypercholesterolemia 10/09/2016       Past Surgical History:   Procedure Laterality Date    BREAST LUMPECTOMY Right 2014    BREAST SURGERY Right     lumpectomy    CHOLECYSTECTOMY  2012    COLONOSCOPY      PATEL STEROTACTIC LOC BREAST BIOPSY LEFT Left 4/12/2021    benign    PATEL STEROTACTIC LOC BREAST BIOPSY RIGHT Right 10/24/2012    benign    TONSILLECTOMY      US BREAST NEEDLE BIOPSY LEFT Right 04/09/2014    Invasive ductal carcinoma       Allergies   Allergen Reactions    Codeine Nausea And Vomiting    Eszopiclone Nausea And Vomiting       Social History     Tobacco Use    Smoking status: Never Smoker    Smokeless tobacco: Never Used   Substance Use Topics    Alcohol use: Yes     Comment: little wine       Family History   Problem Relation Age of Onset    Cancer Mother         Lymphoma    Other Mother     Heart Disease Father         first bypass at age 43    Diabetes Father     Cancer Brother         Lymphoma    Colon Cancer Neg Hx     Breast Cancer Neg Hx          I have reviewed the patient's past medical history, past surgical history, allergies, medications, social and family history and I have made updates where appropriate. Review of Systems  Positive responses are highlighted in bold    Constitutional:  Fever, Chills, Night Sweats, Fatigue, Unexpected changes in weight  HENT:  Ear pain, Tinnitus, Nosebleeds, Trouble swallowing, Hearing loss, Sore throat  Cardiovascular:  Chest Pain, Palpitations, Orthopnea, Paroxysmal Nocturnal Dyspnea  Respiratory:  Cough, Wheezing, Shortness of breath, Chest tightness, Apnea  Gastrointestinal:  Nausea, Vomiting, Diarrhea, Constipation, Heartburn, Blood in stool  Genitourinary:  Difficulty or painful urination, Flank pain, Change in frequency, Urgency  Skin:  Color change, Rash, Itching, Wound  Musculoskeletal:  Joint pain, Back pain, Gait problems, Joint swelling, Myalgias  Neurological:  Dizziness, Headaches, Presyncope, Numbness, Seizures, Tremors  Endocrine:  Heat Intolerance, Cold Intolerance, Polydipsia, Polyphagia, Polyuria      PHYSICAL EXAM:  Vitals:    01/27/22 1049   BP: 132/74   Pulse: 68   Resp: 12   Temp: 97.5 °F (36.4 °C)   TempSrc: Oral   SpO2: 97%   Weight: 191 lb (86.6 kg)   Height: 5' 2\" (1.575 m)     Body mass index is 34.93 kg/m².        VS Reviewed  General Appearance: A&O x 3, No acute distress,well developed and well- nourished  Eyes: pupils equal, round, and reactive to light, extraocular eye movements intact, conjunctivae and eye lids without erythema  ENT: external ear and ear canal clear bilaterally, TMs intact and regular, nose without deformity, nasal mucosa and turbinates normal without polyps, oropharynx normal, dentition is normal for age  Neck: supple and non-tender without mass, no thyromegaly or thyroid nodules, no cervical lymphadenopathy  Pulmonary/Chest: clear to auscultation bilaterally- no wheezes, rales or rhonchi, normal air movement, no respiratory distress or retractions  Cardiovascular: S1 and S2 auscultated w/ RRR. No murmurs, rubs, clicks, or gallops, distal pulses intact. Abdomen: soft, non-tender, non-distended, bowl sounds physiologic,  no rebound or guarding, no masses or hernias noted. Liver and spleen without enlargement. Extremities: no cyanosis, clubbing or edema of the lower extremities. Skin: warm and dry, no rash or erythema  Psych: Affect appropriate. Mood euthymic. Thought process is normal without evidence of depression or psychosis. Good insight and appropriate interaction. Cognition and memory appear to be intact. L Shoulder:  SWELLING: none  DEFORMITY: none  TENDERNESS: none  ROM: decreased to int/ext rotation  STRENGTH: external rotation grade 5 of 5, internal rotation grade 5 of 5, supraspinatus grade 5 of 5, infraspinatus grade 5 of 5, belly press grade 5 of 5 and deltoid grade 5 of 5  STABILITY: normal  SPECIAL TESTS: Suella Batch' test: positive, Cross-chest abduction: negative, Yergason's test: negative, Speed's test: positive and [place]'s test: negative  Neurologic Exam: normal sensation and reflexes  Vascular Exam: radial pulse present and good color & capillary refill  Neck: supple, tender no and ROM: normal      ASSESSMENT & PLAN  1.  Essential (primary) hypertension    Stable  At goal  con't valsartan/hctz  Labs UTD    2. Prediabetes    Stable  con't metformin/lifestyle changes  Labs in 6 months    3. Obesity (BMI 30-39. 9)    Discussed wt loss strategies    4. Pure hypercholesterolemia    Stable  con't lipitor    5. Recurrent major depressive disorder, in full remission ([facility] [address]    Stable  con't celexa    6. ALECIA (generalized anxiety disorder)    A bit worse, but not bad enough to change meds, per pt  con't celexa/buspar  F/u any changes    7. Osteoporosis, post-menopausal    Changed from osteopenia to osteoporosis last dexa  On fosamax x 7 years    Need to change to Prolia    - denosumab (PROLIA) 60 MG/ML SOSY SC injection; Inject 1 mL into the skin every 6 months  Dispense: 180 mL; Refill: 1    8. Iron deficiency anemia due to chronic blood loss    Improved  Neg w/u  con't iron    9. Malignant neoplasm of female breast, unspecified estrogen receptor status, unspecified laterality, unspecified site of breast ([facility]    In remission  F/u onc    10. History of breast cancer      11. Impingement syndrome of left shoulder    Discussed PT vs HEP  Wants to try HEP  If not improving, she will call, and will get setup with formal OP PT/OT      DISPOSITION    Return in about 6 months (around 7/27/2022) for AWV, follow-up on chronic medical conditions, sooner as needed. Garett Grande released without restrictions. Future Appointments   Date Time Provider Ramses Melissa   4/25/2022 10:20 AM STR MAMMOGRAPHY RM2  [place] [facility] STR Radiolog   4/28/2022  3:20 PM Alfredo Watts MD N Oncology [facility] [address] [facility]   7/28/2022 10:20 AM Ellen Disla DO [facility]     PATIENT COUNSELING    Barriers to learning and self management: none    Discussed use, benefit, and side effects of prescribed medications. Barriers to medication compliance addressed. All patient questions answered. Pt voiced understanding.        Electronically signed by Ellen Disla DO on 1/27/2022 at 11:14 AM

## 2022-01-27 ENCOUNTER — OFFICE VISIT (OUTPATIENT)
Dept: FAMILY MEDICINE CLINIC | Age: 72
End: 2022-01-27
Payer: MEDICARE

## 2022-01-27 VITALS
WEIGHT: 191 LBS | HEIGHT: 62 IN | TEMPERATURE: 97.5 F | HEART RATE: 68 BPM | SYSTOLIC BLOOD PRESSURE: 132 MMHG | OXYGEN SATURATION: 97 % | RESPIRATION RATE: 12 BRPM | DIASTOLIC BLOOD PRESSURE: 74 MMHG | BODY MASS INDEX: 35.15 KG/M2

## 2022-01-27 DIAGNOSIS — Z85.3 HISTORY OF BREAST CANCER: ICD-10-CM

## 2022-01-27 DIAGNOSIS — I10 ESSENTIAL (PRIMARY) HYPERTENSION: Primary | ICD-10-CM

## 2022-01-27 DIAGNOSIS — R73.03 PREDIABETES: ICD-10-CM

## 2022-01-27 DIAGNOSIS — F41.1 GAD (GENERALIZED ANXIETY DISORDER): ICD-10-CM

## 2022-01-27 DIAGNOSIS — C50.919 MALIGNANT NEOPLASM OF FEMALE BREAST, UNSPECIFIED ESTROGEN RECEPTOR STATUS, UNSPECIFIED LATERALITY, UNSPECIFIED SITE OF BREAST (HCC): ICD-10-CM

## 2022-01-27 DIAGNOSIS — M75.42 IMPINGEMENT SYNDROME OF LEFT SHOULDER: ICD-10-CM

## 2022-01-27 DIAGNOSIS — D50.0 IRON DEFICIENCY ANEMIA DUE TO CHRONIC BLOOD LOSS: ICD-10-CM

## 2022-01-27 DIAGNOSIS — E78.00 PURE HYPERCHOLESTEROLEMIA: ICD-10-CM

## 2022-01-27 DIAGNOSIS — M81.0 OSTEOPOROSIS, POST-MENOPAUSAL: ICD-10-CM

## 2022-01-27 DIAGNOSIS — E66.9 OBESITY (BMI 30-39.9): ICD-10-CM

## 2022-01-27 DIAGNOSIS — F33.42 RECURRENT MAJOR DEPRESSIVE DISORDER, IN FULL REMISSION (HCC): ICD-10-CM

## 2022-01-27 PROCEDURE — 99214 OFFICE O/P EST MOD 30 MIN: CPT | Performed by: FAMILY MEDICINE

## 2022-01-28 RX ORDER — ALBUTEROL SULFATE 90 UG/1
4 AEROSOL, METERED RESPIRATORY (INHALATION) PRN
Status: CANCELLED | OUTPATIENT
Start: 2022-01-28

## 2022-01-28 RX ORDER — SODIUM CHLORIDE 9 MG/ML
INJECTION, SOLUTION INTRAVENOUS CONTINUOUS
Status: CANCELLED | OUTPATIENT
Start: 2022-01-28

## 2022-01-28 RX ORDER — ONDANSETRON 2 MG/ML
8 INJECTION INTRAMUSCULAR; INTRAVENOUS
Status: CANCELLED | OUTPATIENT
Start: 2022-01-28

## 2022-01-28 RX ORDER — DIPHENHYDRAMINE HYDROCHLORIDE 50 MG/ML
50 INJECTION INTRAMUSCULAR; INTRAVENOUS
Status: CANCELLED | OUTPATIENT
Start: 2022-01-28

## 2022-01-28 RX ORDER — EPINEPHRINE 1 MG/ML
0.3 INJECTION, SOLUTION, CONCENTRATE INTRAVENOUS PRN
Status: CANCELLED | OUTPATIENT
Start: 2022-01-28

## 2022-01-28 RX ORDER — ACETAMINOPHEN 325 MG/1
650 TABLET ORAL
Status: CANCELLED | OUTPATIENT
Start: 2022-01-28

## 2022-01-31 DIAGNOSIS — R73.03 PREDIABETES: Chronic | ICD-10-CM

## 2022-01-31 DIAGNOSIS — E66.9 OBESITY (BMI 30-39.9): ICD-10-CM

## 2022-01-31 NOTE — TELEPHONE ENCOUNTER
Recent Visits  Date Type Provider Dept   01/27/22 Office Visit Bryce Mcbride DO [facility]   07/27/21 Office Visit Bryce Mcbride DO [facility]   01/14/21 Office Visit Bryce Mcbride DO [facility]   Showing recent visits within past 540 days with a meds authorizing provider and meeting all other requirements  Future Appointments  No visits were found meeting these conditions.   Showing future appointments within next 150 days with a meds authorizing provider and meeting all other requirements    Future Appointments   Date Time Provider Ramses Melissa   4/25/2022 10:20 AM STR MAMMOGRAPHY RM2  [facility] WOMEN STR Radiolog   4/28/2022  3:20 PM Miguel Santoro MD N Oncology [facility]   7/28/2022 10:20 AM Bryce Mcbride DO [facility] [facility]

## 2022-02-01 DIAGNOSIS — F41.9 ANXIETY: ICD-10-CM

## 2022-02-01 DIAGNOSIS — F33.0 MAJOR DEPRESSIVE DISORDER, RECURRENT EPISODE, MILD (HCC): ICD-10-CM

## 2022-02-01 RX ORDER — CITALOPRAM 20 MG/1
TABLET ORAL
Qty: 90 TABLET | Refills: 3 | Status: SHIPPED | OUTPATIENT
Start: 2022-02-01

## 2022-02-11 ENCOUNTER — HOSPITAL ENCOUNTER (OUTPATIENT)
Dept: NURSING | Age: 72
Discharge: HOME | End: 2022-02-11
Payer: MEDICARE

## 2022-02-11 VITALS
OXYGEN SATURATION: 94 % | DIASTOLIC BLOOD PRESSURE: 81 MMHG | RESPIRATION RATE: 16 BRPM | SYSTOLIC BLOOD PRESSURE: 182 MMHG | HEART RATE: 62 BPM | TEMPERATURE: 97.3 F

## 2022-02-11 DIAGNOSIS — M81.0 OSTEOPOROSIS, POST-MENOPAUSAL: Primary | ICD-10-CM

## 2022-02-11 PROCEDURE — 6360000002 HC RX W HCPCS: Performed by: FAMILY MEDICINE

## 2022-02-11 PROCEDURE — 96372 THER/PROPH/DIAG INJ SC/IM: CPT

## 2022-02-11 RX ORDER — DIPHENHYDRAMINE HYDROCHLORIDE 50 MG/ML
50 INJECTION INTRAMUSCULAR; INTRAVENOUS
OUTPATIENT
Start: 2022-08-12

## 2022-02-11 RX ORDER — ONDANSETRON 2 MG/ML
8 INJECTION INTRAMUSCULAR; INTRAVENOUS
OUTPATIENT
Start: 2022-08-12

## 2022-02-11 RX ORDER — ALBUTEROL SULFATE 90 UG/1
4 AEROSOL, METERED RESPIRATORY (INHALATION) PRN
OUTPATIENT
Start: 2022-08-12

## 2022-02-11 RX ORDER — SODIUM CHLORIDE 9 MG/ML
INJECTION, SOLUTION INTRAVENOUS CONTINUOUS
OUTPATIENT
Start: 2022-08-12

## 2022-02-11 RX ORDER — ACETAMINOPHEN 325 MG/1
650 TABLET ORAL
OUTPATIENT
Start: 2022-08-12

## 2022-02-11 RX ADMIN — DENOSUMAB 60 MG: 60 INJECTION SUBCUTANEOUS at 12:45

## 2022-02-11 ASSESSMENT — PAIN - FUNCTIONAL ASSESSMENT: PAIN_FUNCTIONAL_ASSESSMENT: 0-10

## 2022-02-11 NOTE — PROGRESS NOTES
1242 Patient arrived to [facility] ambulatory for prolia injection. Oriented to room and call light  PT RIGHTS AND RESPONSIBILITIES OFFERED TO PT.      1245 Injection given and she denies complaints    1315 patient denies complaints. Discharge instructions given and explained and she denies questions.   Discharged ambulatory      __M__ Safety:       (Environmental)  Jessie Tiffanie to environment   Ensure ID band is correct and in place/ allergy band as needed   Assess for fall risk   Initiate fall precautions as applicable (fall band, side rails, etc.)   Call light within reach   Bed in low position/ wheels locked    __M__ Pain:        Assess pain level and characteristics   Administer analgesics as ordered   Assess effectiveness of pain management and report to MD as needed    _M___ Knowledge Deficit:   Assess baseline knowledge   Provide teaching at level of understanding   Provide teaching via preferred learning method   Evaluate teaching effectiveness    _M___ Hemodynamic/Respiratory Status:       (Pre and Post Procedure Monitoring)   Assess/Monitor vital signs and LOC   Assess Baseline SpO2 prior to any sedation   Obtain weight/height   Assess vital signs/ LOC until patient meets discharge criteria   Monitor procedure site and notify MD of any issues

## 2022-03-03 ENCOUNTER — OFFICE VISIT (OUTPATIENT)
Dept: FAMILY MEDICINE CLINIC | Age: 72
End: 2022-03-03
Payer: MEDICARE

## 2022-03-03 VITALS
WEIGHT: 193 LBS | HEART RATE: 88 BPM | TEMPERATURE: 97.7 F | DIASTOLIC BLOOD PRESSURE: 68 MMHG | RESPIRATION RATE: 14 BRPM | OXYGEN SATURATION: 96 % | HEIGHT: 62 IN | BODY MASS INDEX: 35.51 KG/M2 | SYSTOLIC BLOOD PRESSURE: 126 MMHG

## 2022-03-03 DIAGNOSIS — L72.3 INFECTED SEBACEOUS CYST OF SKIN: Primary | ICD-10-CM

## 2022-03-03 DIAGNOSIS — L08.9 INFECTED SEBACEOUS CYST OF SKIN: Primary | ICD-10-CM

## 2022-03-03 PROCEDURE — 99213 OFFICE O/P EST LOW 20 MIN: CPT | Performed by: FAMILY MEDICINE

## 2022-03-03 RX ORDER — SULFAMETHOXAZOLE AND TRIMETHOPRIM 800; 160 MG/1; MG/1
1 TABLET ORAL 2 TIMES DAILY
Qty: 20 TABLET | Refills: 0 | Status: SHIPPED | OUTPATIENT
Start: 2022-03-03 | End: 2022-03-12

## 2022-03-03 NOTE — PROGRESS NOTES
Chief Complaint   Patient presents with    Other     cyst on back - draining yellow upper mid back  35 + years        History obtained from the patient. SUBJECTIVE:  Pradeep Hutson is a 66 y.o. female that presents today for     -Cyst on back  Been there for 30 years  Larger the last wk, more painful  Draining the last 48 hours or so  No fevers      Age/Gender Health Maintenance    Lipid - ; LDL 76; HDL 50;  (OCT 2018)  TC  177;  LDL 86; HDL 60;  (OCT 2017)  ; LDL 91; HDL 60;  (JAN 2016)    Lab Results   Component Value Date    CHOL 166 01/19/2022    CHOL 170 01/06/2021    CHOL 159 11/06/2019     Lab Results   Component Value Date    TRIG 173 (H) 01/19/2022    TRIG 188 01/06/2021    TRIG 129 11/06/2019     Lab Results   Component Value Date    HDL 52 01/19/2022    HDL 53 01/06/2021    HDL 54 11/06/2019     Lab Results   Component Value Date    LDLCALC 79 01/19/2022    LDLCALC 79 01/06/2021    LDLCALC 79 11/06/2019       DM Screen -   Lab Results   Component Value Date    GLUCOSE 139 01/19/2022     Lab Results   Component Value Date    LABA1C 6.1 01/19/2022    LABA1C 6.1 07/23/2021    LABA1C 5.7 01/06/2021    LABA1C 6.3 11/06/2019    LABA1C 6.1 04/26/2019    LABA1C 6.4 10/23/2018     123 with a1c 6.4 (OCT 2018)  114 (JAN 2016) with A1C 6.0 (JAN 2016)  Glucose: 134/A1C 6.1 (SEPT 2016)      Colon Cancer Screening - NEG June 2016, repeat 10 years/ NEG SEPT 2019 with hyperplastic polyp SEPT 2019, repeat 10 years (Dr. Chapo Mckeon)   Phyllis Parker (Age 52 to [de-identified] with 30 pack year hx, current smoker or quit within past 15 years) - never smoker    Tetanus - to get at pharmacy per medicare rules  Influenza Vaccine - UTD FALL 2021  Pneumonia Vaccine - UTD PCV 13 OCT 2016/PPV 23 in OCT 2018  Zoster - pt to get at pharmacy (Christie 97 2016)/to get at pharmacy per medicare rules    Breast Cancer Screening - Birads 3 JAN 2018, repeat 6 months, oncology managing. Eufemia Mendoza 3 July 2018 on Left with repeat 6 months, oncology managing/NEG bilat dx mammo MARCH 2019/neg mammo MARCH 2020/ABN PATEL Spring 2021 with neg bx/NEG PATEL NOV 2021  Osteoporosis Screening - Osteopenia MAY 2014/with stable osteopniea JAN 2018 repeat 3 years/Osteoporosis [facility] 2021    Specialist List  Ortho: Medhat Caro  GI: Marino      Current Outpatient Medications   Medication Sig Dispense Refill    sulfamethoxazole-trimethoprim (BACTRIM DS;SEPTRA DS) 800-160 MG per tablet Take 1 tablet by mouth 2 times daily for 10 days 20 tablet 0    citalopram (CELEXA) 20 MG tablet TAKE 1 TABLET BY MOUTH EVERY DAY 90 tablet 3    metFORMIN (GLUCOPHAGE) 500 MG tablet TAKE 1 TABLET BY MOUTH TWICE DAILY WITH MEALS 180 tablet 3    denosumab (PROLIA) 60 MG/ML SOSY SC injection Inject 1 mL into the skin every 6 months 180 mL 1    valsartan-hydroCHLOROthiazide (DIOVAN-HCT) 320-25 MG per tablet TAKE 1 TABLET BY MOUTH EVERY DAY 90 tablet 3    atorvastatin (LIPITOR) 20 MG tablet TAKE 1 TABLET BY MOUTH DAILY 90 tablet 3    MULTIPLE VITAMIN PO Take by mouth daily      busPIRone (BUSPAR) 10 MG tablet TAKE 1 TABLET BY MOUTH TWICE DAILY 180 tablet 3    NONFORMULARY Take 150 mg by mouth Indications: Iron pills  ordered by GI      Misc Natural Products (GLUCOSAMINE CHOND DOUBLE STR) TABS Take by mouth      vitamin D3 (COLECALCIFEROL) 400 UNITS TABS Take by mouth daily      aspirin 81 MG tablet Take 81 mg by mouth daily. No current facility-administered medications for this visit. Orders Placed This Encounter   Medications    sulfamethoxazole-trimethoprim (BACTRIM DS;SEPTRA DS) 800-160 MG per tablet     Sig: Take 1 tablet by mouth 2 times daily for 10 days     Dispense:  20 tablet     Refill:  0         All medications reviewed and reconciled, including OTC and herbal medications. Updated list given to patient.        Patient Active Problem List    Diagnosis Date Noted    Iron deficiency anemia due to chronic blood loss 07/17/2019    Chronic anemia 12/05/2018    Obesity (BMI 30-39. 9)     Pure hypercholesterolemia 10/09/2016    Osteoarthritis of thoracolumbar spine      Following with Dr. Emerita Butler Prediabetes     Major depression     Osteoporosis, post-menopausal     Encounter for monitoring aromatase inhibitor therapy 11/10/2015    OA (osteoarthritis) 10/11/2014    Essential (primary) hypertension     ALECIA (generalized anxiety disorder)     Breast cancer ([facility] [address] 04/23/2014     S/p lumpectomy. Following with oncology, Dr. Yadiel Osborne. Past Medical History:   Diagnosis Date    Breast cancer ([facility] [address] 04/23/2014    Essential (primary) hypertension     ALECIA (generalized anxiety disorder)     History of hip replacement     History of therapeutic radiation     Major depression     Morbid obesity ([facility] [address]     OA (osteoarthritis) 10/11/2014    Obesity (BMI 30-39. 9)     Osteoarthritis of thoracolumbar spine     Osteoporosis, post-menopausal     Prediabetes     Pure hypercholesterolemia 10/09/2016       Past Surgical History:   Procedure Laterality Date    BREAST LUMPECTOMY Right 2014    BREAST SURGERY Right     lumpectomy    CHOLECYSTECTOMY  2012    COLONOSCOPY      PATEL STEROTACTIC LOC BREAST BIOPSY LEFT Left 4/12/2021    benign    PATEL STEROTACTIC LOC BREAST BIOPSY RIGHT Right 10/24/2012    benign    TONSILLECTOMY      US BREAST NEEDLE BIOPSY LEFT Right 04/09/2014    Invasive ductal carcinoma       Allergies   Allergen Reactions    Codeine Nausea And Vomiting    Eszopiclone Nausea And Vomiting       Social History     Tobacco Use    Smoking status: Never Smoker    Smokeless tobacco: Never Used   Substance Use Topics    Alcohol use: Yes     Comment: little wine       Family History   Problem Relation Age of Onset    Cancer Mother         Lymphoma    Other Mother     Heart Disease Father         first bypass at age 43    Diabetes Father     Cancer Brother         Lymphoma    Colon Cancer Neg Hx     Breast Cancer Neg Hx          I have reviewed the patient's past medical history, past surgical history, allergies, medications, social and family history and I have made updates where appropriate. Review of Systems  Positive responses are highlighted in bold    Constitutional:  Fever, Chills, Night Sweats, Fatigue, Unexpected changes in weight  HENT:  Ear pain, Tinnitus, Nosebleeds, Trouble swallowing, Hearing loss, Sore throat  Cardiovascular:  Chest Pain, Palpitations, Orthopnea, Paroxysmal Nocturnal Dyspnea  Respiratory:  Cough, Wheezing, Shortness of breath, Chest tightness, Apnea  Gastrointestinal:  Nausea, Vomiting, Diarrhea, Constipation, Heartburn, Blood in stool  Genitourinary:  Difficulty or painful urination, Flank pain, Change in frequency, Urgency  Skin:  Color change, Rash, Itching, Wound  Musculoskeletal:  Joint pain, Back pain, Gait problems, Joint swelling, Myalgias  Neurological:  Dizziness, Headaches, Presyncope, Numbness, Seizures, Tremors  Endocrine:  Heat Intolerance, Cold Intolerance, Polydipsia, Polyphagia, Polyuria      PHYSICAL EXAM:  Vitals:    03/03/22 1457   BP: 126/68   Pulse: 88   Resp: 14   Temp: 97.7 °F (36.5 °C)   TempSrc: Oral   SpO2: 96%   Weight: 193 lb (87.5 kg)   Height: 5' 2\" (1.575 m)     Body mass index is 35.3 kg/m².   Pain Score:   0 - No pain    VS Reviewed  General Appearance: A&O x 3, No acute distress,well developed and well- nourished  Eyes: pupils equal, round, and reactive to light, extraocular eye movements intact, conjunctivae and eye lids without erythema  ENT: external ear and ear canal clear bilaterally, TMs intact and regular, nose without deformity, nasal mucosa and turbinates normal without polyps, oropharynx normal, dentition is normal for age  Neck: supple and non-tender without mass, no thyromegaly or thyroid nodules, no cervical lymphadenopathy  Pulmonary/Chest: clear to auscultation bilaterally- no wheezes, rales or rhonchi, normal air movement, no respiratory distress or retractions  Cardiovascular: S1 and S2 auscultated w/ RRR. No murmurs, rubs, clicks, or gallops, distal pulses intact. Abdomen: soft, non-tender, non-distended, bowl sounds physiologic,  no rebound or guarding, no masses or hernias noted. Liver and spleen without enlargement. Extremities: no cyanosis, clubbing or edema of the lower extremities. Skin: warm and dry, no rash or erythema other than 2 cm x 2 cm cyst mild thoracic spine. Draining yellow discharge. Mild TTP. ASSESSMENT & PLAN  1. Infected sebaceous cyst of skin    Nothing to I+D at this point since already draining  Start bactrim DS bid x 10 days  F/u next wk if not improving  Reviewed ER precautions, pt understands. - sulfamethoxazole-trimethoprim (BACTRIM DS;SEPTRA DS) 800-160 MG per tablet; Take 1 tablet by mouth 2 times daily for 10 days  Dispense: 20 tablet; Refill: 0      DISPOSITION    Return if symptoms worsen or fail to improve. [facility] Neigh released without restrictions. Future Appointments   Date Time Provider Ramses Melissa   4/25/2022 10:20 AM STR MAMMOGRAPHY RM2  Carminena Piper WOMEN STR Radiolog   4/28/2022  3:20 PM Chandler Bernardo MD N Oncology [facility] [address]   7/28/2022 10:20 AM Nena Quiroz DO [facility] [place]   8/12/2022  1:00 PM STR EXAM ROOM 5 [facility] OP NURS [place] H[facility]     PATIENT COUNSELING    Barriers to learning and self management: none    Discussed use, benefit, and side effects of prescribed medications. Barriers to medication compliance addressed. All patient questions answered. Pt voiced understanding.        Electronically signed by Nena Quiroz DO on 3/3/2022 at 3:23 PM

## 2022-03-12 ENCOUNTER — TELEPHONE (OUTPATIENT)
Dept: FAMILY MEDICINE CLINIC | Age: 72
End: 2022-03-12

## 2022-03-12 RX ORDER — CLINDAMYCIN HYDROCHLORIDE 300 MG/1
300 CAPSULE ORAL 3 TIMES DAILY
Qty: 21 CAPSULE | Refills: 0 | Status: SHIPPED | OUTPATIENT
Start: 2022-03-12 | End: 2022-03-19

## 2022-03-14 NOTE — TELEPHONE ENCOUNTER
Late entry, received call on 3/12, patient recently started on Bactrim. Since that time, notes that she is 'feeling hot'. No rash, edema. Advised to stop this, clindamycin sent in place of Bactrim.

## 2022-04-25 ENCOUNTER — HOSPITAL ENCOUNTER (OUTPATIENT)
Dept: WOMENS IMAGING | Age: 72
Discharge: HOME | End: 2022-04-25
Payer: MEDICARE

## 2022-04-25 DIAGNOSIS — C50.911 MALIGNANT NEOPLASM OF RIGHT BREAST IN FEMALE, ESTROGEN RECEPTOR POSITIVE, UNSPECIFIED SITE OF BREAST (HCC): ICD-10-CM

## 2022-04-25 DIAGNOSIS — Z12.31 ENCOUNTER FOR SCREENING MAMMOGRAM FOR MALIGNANT NEOPLASM OF BREAST: ICD-10-CM

## 2022-04-25 DIAGNOSIS — Z17.0 MALIGNANT NEOPLASM OF RIGHT BREAST IN FEMALE, ESTROGEN RECEPTOR POSITIVE, UNSPECIFIED SITE OF BREAST (HCC): ICD-10-CM

## 2022-04-25 PROCEDURE — 77063 BREAST TOMOSYNTHESIS BI: CPT

## 2022-04-28 ENCOUNTER — HOSPITAL ENCOUNTER (OUTPATIENT)
Dept: INFUSION THERAPY | Age: 72
Discharge: HOME | End: 2022-04-28
Payer: MEDICARE

## 2022-04-28 ENCOUNTER — OFFICE VISIT (OUTPATIENT)
Dept: ONCOLOGY | Age: 72
End: 2022-04-28
Payer: MEDICARE

## 2022-04-28 VITALS
RESPIRATION RATE: 18 BRPM | HEART RATE: 105 BPM | TEMPERATURE: 98.3 F | SYSTOLIC BLOOD PRESSURE: 142 MMHG | DIASTOLIC BLOOD PRESSURE: 82 MMHG

## 2022-04-28 VITALS
HEART RATE: 105 BPM | TEMPERATURE: 98.3 F | BODY MASS INDEX: 35.51 KG/M2 | HEIGHT: 62 IN | DIASTOLIC BLOOD PRESSURE: 82 MMHG | WEIGHT: 193 LBS | SYSTOLIC BLOOD PRESSURE: 142 MMHG | RESPIRATION RATE: 18 BRPM | OXYGEN SATURATION: 95 %

## 2022-04-28 DIAGNOSIS — C50.911 MALIGNANT NEOPLASM OF RIGHT BREAST IN FEMALE, ESTROGEN RECEPTOR POSITIVE, UNSPECIFIED SITE OF BREAST (HCC): Primary | ICD-10-CM

## 2022-04-28 DIAGNOSIS — Z17.0 MALIGNANT NEOPLASM OF RIGHT BREAST IN FEMALE, ESTROGEN RECEPTOR POSITIVE, UNSPECIFIED SITE OF BREAST (HCC): Primary | ICD-10-CM

## 2022-04-28 DIAGNOSIS — E66.01 SEVERE OBESITY (BMI 35.0-39.9) WITH COMORBIDITY (HCC): ICD-10-CM

## 2022-04-28 PROCEDURE — 99215 OFFICE O/P EST HI 40 MIN: CPT | Performed by: INTERNAL MEDICINE

## 2022-04-28 PROCEDURE — 99211 OFF/OP EST MAY X REQ PHY/QHP: CPT

## 2022-04-28 RX ORDER — FERROUS SULFATE 325(65) MG
650 TABLET ORAL
COMMUNITY

## 2022-04-28 NOTE — PROGRESS NOTES
[address] [facility]  [address] [place] [address]  Dept: [phone]  Loc: [phone]   Hematology/Oncology Progress Note [facility])        Ruby Valenzuela  [birth year] 4/28/2022     No ref. provider found   Juan Cousins, DO     DIAGNOSIS:   -Right breast infiltrating ductal carcinoma, grade 2, ER/WI strongly positive HER2 negative, 0.4 cm all 4 sentinel nodes negative. T1 a N0 M0 stage I.  Diagnosis February 2014    -March 2021 abnormal annual mammogram with cluster of calcifications in the left breast.  Stereotactic biopsy 12/21 showed a fibroadenoma and no malignancy. November 21 follow-up mammography unremarkable and April 2022 mammography unremarkable    -History of iron deficiency anemia in 2019 approximately resolved with oral iron. Hemoglobin 1 year ago normal at 15.4 with normal indices. Unremarkable EGD and colonoscopy in August 2019      TREATMENT:   -Right lumpectomy and sentinel node procedure 4/24/2014  -Postlumpectomy radiation completed 7/20/2014  -Arimidex began July 30 2014 x 5 to 6 years. Followable Disease:   Standard history exam and routine labs. Annual mammography      Comorbidities:  See below      Subjective:   No new focal or systemic complaints. Overall sense of wellbeing remains stable and unremarkable    ROS:  Review of Systems 14 point negative except as above. PMH:   Past Medical History:   Diagnosis Date    Breast cancer ([address] 04/23/2014    Essential (primary) hypertension     ALECIA (generalized anxiety disorder)     History of hip replacement     History of therapeutic radiation     Major depression     Morbid obesity ([address]     OA (osteoarthritis) 10/11/2014    Obesity (BMI 30-39. 9)     Osteoarthritis of thoracolumbar spine     Osteoporosis, post-menopausal     Prediabetes     Pure hypercholesterolemia 10/09/2016        Social HX:   Social History     Socioeconomic History  Marital status:      Spouse name: Not on file    Number of children: Not on file    Years of education: Not on file    Highest education level: Not on file   Occupational History    Not on file   Tobacco Use    Smoking status: Never Smoker    Smokeless tobacco: Never Used   Substance and Sexual Activity    Alcohol use: Yes     Comment: little wine    Drug use: No    Sexual activity: Not on file   Other Topics Concern    Not on file   Social History Narrative    Not on file     Social Determinants of Health     Financial Resource Strain: Low Risk     Difficulty of Paying Living Expenses: Not hard at all   Food Insecurity: No Food Insecurity    Worried About [facility] in the Last Year: Never true    Alejandro of Food in the Last Year: Never true   Transportation Needs: No Transportation Needs    Lack of Transportation (Medical): No    Lack of Transportation (Non-Medical):  No   Physical Activity:     Days of Exercise per Week: Not on file    Minutes of Exercise per Session: Not on file   Stress:     Feeling of Stress : Not on file   Social Connections:     Frequency of Communication with Friends and Family: Not on file    Frequency of Social Gatherings with Friends and Family: Not on file    Attends Gnosticism Services: Not on file    Active Member of Clubs or Organizations: Not on file    Attends Club or Organization Meetings: Not on file    Marital Status: Not on file   Intimate Partner Violence:     Fear of Current or Ex-Partner: Not on file    Emotionally Abused: Not on file    Physically Abused: Not on file    Sexually Abused: Not on file   Housing Stability: Unknown    Unable to Pay for Housing in the Last Year: No    Number of [place] in the Last Year: Not on file    Unstable Housing in the Last Year: No        Spouse:     Phone: [phone]     Employment:  Retired cafeteria worker    Immunizations:  Immunization History   Administered Date(s) Administered    COVID-19, Arielle New, Primary or Immunocompromised, PF, 100mcg/0.5mL 02/04/2021, 03/02/2021, 12/20/2021    Influenza Vaccine, unspecified formulation 10/18/2016    Influenza Virus Vaccine 09/18/2020, 11/27/2021    Influenza, High Dose (Fluzone 65 yrs and older) 10/18/2018    Influenza, Quadv, IM, (6 mo and older Fluzone, Flulaval, Fluarix and 3 yrs and older Afluria) 10/16/2017    Influenza, Triv, inactivated, subunit, adjuvanted, IM (Fluad 65 yrs and older) 09/18/2019    Pneumococcal Conjugate 13-valent ([id number]) 10/18/2016    Pneumococcal Polysaccharide ([id number]) 10/29/2018        Health Screenings:  Mammogram:  Results for orders placed during the hospital encounter of 03/17/20    [facility] MAXIMINO DIGITAL SCREEN SELF REFERRAL W OR WO CAD BILATERAL    Impression  Mammogram BI-RADS: 2: Benign    There is no mammographic evidence of malignancy. A 1 year  screening mammogram is recommended. The patient has been entered  into our database and they will receive a notification when they  are due for their next exam.  The patient was notified of the results. #[id number] - [facility] MAXIMINO DIGITAL SCREEN SELF REFERRAL W OR WO CAD  BILATERAL  BILATERAL DIGITAL SCREENING MAMMOGRAM 3D/2D WITH CAD: 3/17/2020  CLINICAL: Tomosynthesis. Self referred screening mammogram.  Personal history of breast cancer. Comparison is made to exams dated:  3/13/2019 mammogram and  1/2/2018 mammogram - [id number] [facility] [address]. There are scattered fibroglandular elements in both breasts that  could obscure a lesion on mammography. Current study was also evaluated with a Computer Aided Detection  (CAD) system.   There are benign calcifications left breast.  There also are  benign scattered calcifications left breast.  Additionally, there  is a biopsy clip right breast.  No significant masses, calcifications, or other findings are seen  in either breast.  There has been no significant interval change. C-Scope: August 2019          Health Maintenance Due   Topic Date Due    DTaP/Tdap/Td vaccine (1 - Tdap) Never done    Shingles Vaccine (1 of 2) Never done        Interests:   Not reviewed    Fam HX:   Family History   Problem Relation Age of Onset   Blase Liming Cancer Mother         Lymphoma    Other Mother     Heart Disease Father         first bypass at age 43    Diabetes Father     Cancer Brother         Lymphoma    Colon Cancer Neg Hx     Breast Cancer Neg Hx         Hospitalizations:   None recent    Allergies: Allergies   Allergen Reactions    Codeine Nausea And Vomiting    Eszopiclone Nausea And Vomiting        Adult Illness:  Patient Active Problem List   Diagnosis    [facility] [address]    Essential (primary) hypertension    ALECIA (generalized anxiety disorder)    OA (osteoarthritis)    Encounter for monitoring aromatase inhibitor therapy    Osteoporosis, post-menopausal    Osteoarthritis of thoracolumbar spine    Prediabetes    Major depression    Pure hypercholesterolemia    Obesity (BMI 30-39. 9)    Chronic anemia    Iron deficiency anemia due to chronic blood loss        Surgery:  Past Surgical History:   Procedure Laterality Date    BREAST LUMPECTOMY Right 2014    BREAST SURGERY Right     lumpectomy    CHOLECYSTECTOMY  2012    COLONOSCOPY      PATEL STEROTACTIC LOC BREAST BIOPSY LEFT Left 4/12/2021    benign    PATEL STEROTACTIC LOC BREAST BIOPSY RIGHT Right 10/24/2012    benign    TONSILLECTOMY      US BREAST NEEDLE BIOPSY LEFT Right 04/09/2014    Invasive ductal carcinoma        Medications:  Current Outpatient Medications   Medication Sig Dispense Refill    ferrous sulfate (IRON 325) 325 (65 Fe) MG tablet Take 650 mg by mouth daily (with breakfast)      citalopram (CELEXA) 20 MG tablet TAKE 1 TABLET BY MOUTH EVERY DAY 90 tablet 3    metFORMIN (GLUCOPHAGE) 500 MG tablet TAKE 1 TABLET BY MOUTH TWICE DAILY WITH MEALS (Patient taking differently: daily (with breakfast) ) 180 tablet 3    denosumab (PROLIA) 60 MG/ML SOSY SC injection Inject 1 mL into the skin every 6 months 180 mL 1    valsartan-hydroCHLOROthiazide (DIOVAN-HCT) 320-25 MG per tablet TAKE 1 TABLET BY MOUTH EVERY DAY 90 tablet 3    atorvastatin (LIPITOR) 20 MG tablet TAKE 1 TABLET BY MOUTH DAILY 90 tablet 3    MULTIPLE VITAMIN PO Take by mouth daily      busPIRone (BUSPAR) 10 MG tablet TAKE 1 TABLET BY MOUTH TWICE DAILY 180 tablet 3    NONFORMULARY Take 150 mg by mouth Indications: Iron pills  ordered by GI      Misc Natural Products (GLUCOSAMINE CHOND DOUBLE STR) TABS Take by mouth      vitamin D3 (COLECALCIFEROL) 400 UNITS TABS Take by mouth daily      aspirin 81 MG tablet Take 81 mg by mouth daily. No current facility-administered medications for this visit. EXAM:   height is 5' 2\" (1.575 m) and weight is 193 lb (87.5 kg). Her oral temperature is 98.3 °F (36.8 °C). Her blood pressure is 142/82 (abnormal) and her pulse is 105. Her respiration is 18 and oxygen saturation is 95%. Estimated body surface area is 1.96 meters squared as calculated from the following:    Height as of this encounter: 5' 2\" (1.575 m). Weight as of this encounter: 193 lb (87.5 kg). ECO    General: Non-ill appearing. Appears relaxed in no distress. HEENT: NC/AT,nonicteric,   Neck: normal thyroid, no masses. pulses nl,  Nodes: No adenopathy  Lungs/chest: clear, no rales,rhonchi or wheezing, lung bases clear  CV: rrr, no rubs ,gallops or murmurs  Breasts: normal exam.  Healed incision site inferiorly right breast.  Chaperoned by Graham Wood  Abd/Rectal: soft, non-tender,bowel sounds normal , no HSM,no masses, mildly obese  Back: normal curvature, No midline tenderness. flanks nontender  : Not Examined  Extremities: no cyanosis,clubbing or edema.   Skin: unremarkable  Neuro: A and O x 4, CN exam nonfocal, Motor- no deficits, Sensory- no deficits, gait-nl, speech- fluent, no ataxia.   Devices: none    DATA:  LAB:   4/28/2022no labs drawn today    CBC with Differential:      Lab Results   Component Value Date    WBC 5.7 01/19/2022    WBC 6.0 04/29/2021    RBC 4.91 01/19/2022    HGB 15.3 01/19/2022    HCT 45.2 01/19/2022     01/19/2022     04/29/2021    MCV 92 01/19/2022    MCH 31.2 01/19/2022    MCHC 33.9 01/19/2022    RDW 12.9 01/19/2022    NRBC 0 01/06/2021    SEGSPCT 61.5 01/06/2021    LYMPHOPCT 33.8 01/19/2022    MONOPCT 7.2 01/19/2022    EOSPCT 1.7 01/19/2022    BASOPCT 0.8 01/19/2022    MONOSABS 0.4 01/19/2022    MONOSABS 0.5 04/29/2021    LYMPHSABS 1.9 01/19/2022    LYMPHSABS 1.7 04/29/2021    EOSABS 0.1 01/19/2022    EOSABS 0.1 04/29/2021    BASOSABS 0.0 01/19/2022    BASOSABS 0.1 04/29/2021      Lab Results   Component Value Date/Time    SEGSABS 3.6 04/29/2021 11:59 AM       CMP:    Lab Results   Component Value Date     01/19/2022    K 3.7 01/19/2022     01/19/2022    CO2 25 01/19/2022    BUN 16 01/19/2022    CREATININE 0.59 01/19/2022    LABGLOM >90 01/06/2021    GLUCOSE 139 01/19/2022    PROT 7.3 01/19/2022    LABALBU 4.6 01/19/2022    CALCIUM 9.6 01/19/2022    BILITOT 0.7 01/19/2022    ALKPHOS 73 01/19/2022    ALKPHOS 89 01/06/2021    AST 32 01/19/2022    ALT 47 01/19/2022       BMP:    Lab Results   Component Value Date     01/19/2022    K 3.7 01/19/2022     01/19/2022    CO2 25 01/19/2022    BUN 16 01/19/2022    LABALBU 4.6 01/19/2022    CREATININE 0.59 01/19/2022    CALCIUM 9.6 01/19/2022    LABGLOM >90 01/06/2021    GLUCOSE 139 01/19/2022       Magnesium:    Lab Results   Component Value Date    MG 2.0 10/12/2014     PT/INR:  No results found for: PROTIME, INR  TSH:    Lab Results   Component Value Date    TSH 2.24 01/19/2022     VITAMIN B12: No components found for: B12  FOLATE:    Lab Results   Component Value Date    FOLATE > 20.0 11/30/2018     IRON:    Lab Results   Component Value Date    IRON 121 04/16/2020     Iron Saturation:  No components found for: PERCENTFE  TIBC:    Lab Results   Component Value Date    TIBC 391 05/05/2020     FERRITIN:    Lab Results   Component Value Date    FERRITIN 224 05/05/2020             IMAGING:    PATEL MAXIMINO DIGITAL SCREEN BILATERAL  Result Date: 4/25/2022  No mammographic evidence of malignancy. A 1 year screening mammogram is recommended. A result letter will be sent to the patient. She will also receive a reminder 1 month prior to her next mammogram. . BI-RADS CATEGORY 2: BENIGN FINDINGS. Management Recommendation: Routine annual mammography. **This report has been created using voice recognition software. It may contain minor errors which are inherent in voice recognition technology. ** Final report electronically signed by Dr. Joya Tom on 4/25/2022 1:24 PM     DEXA scan 3/30/2021osteoporosis. PROCEDURES:  None recent    PATHOLOGY:   See below summary    GENETICS:  None    MOLECULAR:  None      ASSESSMENT/PLAN:    1: Diagnosis: Very pleasant relatively healthy 68-year-old female with a history of breast cancer. Summarized above. Completed adjuvant Arimidex for approximately 5 to 6 years. 2019 had iron deficiency anemia with negative colonoscopy and EGD and resolution with oral iron. No recurrence. Patient does have osteoporosis. 2) Prognosis / Disease Status: Excellent/BANDAR    3) Work-up:    Labs: No labs needed today 4/28/2022   Imaging: Mammography up-to-date April 2022 unremarkable   Procedures: None new   Consults: None   Other: None    4) Symptom Management:    She is asymptomatic        5) Supportive care provided. Level of care is appropriate. Teaching done today. Commend she take calcium and vitamin D. Consider either starting Fosamax weekly or even Prolia every 6 months. We can rediscuss this at follow-up in 1 year defer to her PCP      6) Treatment goal:      Treatment plan:     Surveillance at this time      7) Medications reviewed. Prescriptions today: None            No orders of the defined types were placed in this encounter. [place]ARRS:  Controlled Substance Monitoring:    Acute and Chronic Pain Monitoring:   No flowsheet data found. 8) Research Options:   None      9) Other:      None    10) Follow Up:  Routine follow-up in 1 year.         Dimple Estrada MD

## 2022-05-03 DIAGNOSIS — E78.00 PURE HYPERCHOLESTEROLEMIA: Chronic | ICD-10-CM

## 2022-05-03 RX ORDER — ATORVASTATIN CALCIUM 20 MG/1
TABLET, FILM COATED ORAL
Qty: 90 TABLET | Refills: 3 | Status: SHIPPED | OUTPATIENT
Start: 2022-05-03

## 2022-06-05 DIAGNOSIS — F41.1 GAD (GENERALIZED ANXIETY DISORDER): Chronic | ICD-10-CM

## 2022-06-06 RX ORDER — BUSPIRONE HYDROCHLORIDE 10 MG/1
TABLET ORAL
Qty: 180 TABLET | Refills: 3 | Status: SHIPPED | OUTPATIENT
Start: 2022-06-06

## 2022-06-24 ENCOUNTER — TELEPHONE (OUTPATIENT)
Dept: FAMILY MEDICINE CLINIC | Age: 72
End: 2022-06-24

## 2022-06-24 DIAGNOSIS — R73.03 PREDIABETES: Primary | Chronic | ICD-10-CM

## 2022-06-24 DIAGNOSIS — R73.9 HYPERGLYCEMIA: ICD-10-CM

## 2022-06-24 NOTE — LETTER
[address] [facility]  [id number] [facility]. [facility] [address]  Phone: [phone]  Fax: [address] ,         June 28, 2022    Jen Parisin  [facility] [facility] [address]      Dear Aby Guaman: We have made several attempts to contact you by phone and have   been unsuccessful. Please call our office at your earliest convenience  At [phone] opt [phone]. Thank you. If you have any questions or concerns, please don't hesitate to call.     Sincerely,        Karime Washington, DO

## 2022-06-24 NOTE — TELEPHONE ENCOUNTER
Pt due for fasting labs prior to next apt on 7/28/2022. Please call to have pt complete this. Thanks! ASSESSMENT & PLAN   Diagnosis Orders   1. Prediabetes  Glucose, Random    Hemoglobin A1C   2.  Hyperglycemia  Glucose, Random    Hemoglobin A1C     Future Appointments   Date Time Provider Ramses Melissa   7/28/2022 10:20 AM [address]   8/12/2022  1:00 PM STR EXAM ROOM 5 STRZ OP NURS [place] HOD   4/26/2023 10:40 AM Sunitha Meraz MD [facility]

## 2022-07-21 LAB — GLUCOSE: 144 MG/DL (ref 70–99)

## 2022-07-22 ENCOUNTER — TELEPHONE (OUTPATIENT)
Dept: FAMILY MEDICINE CLINIC | Age: 72
End: 2022-07-22

## 2022-07-22 LAB
AVERAGE GLUCOSE: 128 MG/DL
HBA1C MFR BLD: 6.1 % (ref 4.2–5.6)

## 2022-07-22 NOTE — TELEPHONE ENCOUNTER
----- Message from TASIA Killian CNP sent at 7/22/2022  8:42 AM EDT -----  Let Sonu Navarro know her A1C is stable and within appropriate ranges

## 2022-07-27 NOTE — PROGRESS NOTES
Chief Complaint   Patient presents with    Medicare AWV    Hearing Loss     Bilateral    Follow-up     Chronic issues as noted below     History obtained from the patient. SUBJECTIVE:  Heidy Chavira is a 74 y.o. female that presents today for     -Ears plugged:  Dec hearing the last wk  Was using q-tips and then things go worse  No pain  No tinnitus  No drainage      -HTN:    HPI:     Taking meds as prescribed ?: yes  Tolerating well ?: yes  Side Effects ?: denies  BP at home ?: <140/90  Working on TLCS ?: yes  Chest Pain/SOB/Palpitations? denies    BP Readings from Last 3 Encounters:   07/28/22 124/76   04/28/22 (!) 142/82   04/28/22 (!) 142/82       -PreDM PRIOR VISIT: wts going up d/t estrogen blocker. Due for labs. Trying to watch diet. UPDATE PRIOR VISIT: wts down a few labs. Had labs done, those are pending. UPDATE PRIOR VISIT: wts up a bit. Higher d/t estrogen blockers. Trying to make diet changes. a1c stable. UPDATE PRIOR VISIT: wts stable. Labs a little worse. On meformin. Working on life style changes. UPDATE PRIOR VISIT: wts improved. Labs stable. On metformin. Working on life style changes.       UPDATE PRIOr VISIT:   Labs stable  wts about the same  On metfromin  Working on life style changes     UPDATE PRIOR VISIT:   Due for labs  On metformin  Working on lifestyle changes     UPDATE TODAY:   Labs stable  On metformin yet   Doing lifestyle changes yet    Lab Results   Component Value Date/Time    LABA1C 6.1 07/20/2022 08:47 AM    LABA1C 6.1 01/19/2022 09:52 AM    LABA1C 6.1 07/23/2021 08:26 AM    LABA1C 5.7 01/06/2021 09:22 AM    LABA1C 6.3 11/06/2019 08:09 AM    LABA1C 6.1 04/26/2019 08:38 AM     Wt Readings from Last 3 Encounters:   07/28/22 187 lb (84.8 kg)   04/28/22 193 lb (87.5 kg)   03/03/22 193 lb (87.5 kg)       -HLD:    HPI:    Taking meds as prescribed ?: yes  Tolerating well ?: yes  Side Effects ?: denies  Muscle Pain?: denies  Working on TLCS ?: yes      -Depression/Anxiety: depression and anxiety doing well on buspar/celexa. No SI/HI      -Osteoporosis:    Was on alendronate, on for ~ 7 years  Last dexa changed from osteopenia to osteoporosis  Was on anastrozolefor breast CA for 5 years  No falls or fxrs  On Prolia, 1st injection completed 2/11/2022, 2nd scheduled for AUG 2022      -TIFFANY PRIOr VISIT:  On iron   Saw GI, neg w/u  Saw heme, neg w/u  Due for f/u labs, has order  No bleeding, melena or hematochezia    UPDATE PRIOR VISIT:   Neg w/u with GI and heme  On iron   Cbc back to WNL  No abd pain  Denies bleeding,melena or hematochezia. UPDATE LAST VISIT:   Doing well  On iron once daily  Labs improved  No bleeding, melena or hematochezia     UPDATE TODAY:   On iron once daily  Labs stable  No bleeding, melena or hematochezia       -Hx of Breast CA:  Under went R lumpectomy 2014  [facility]cology [facility]  Abnormal PATEL in spring 2021 with neg bx  NEG PATEL NOV 2021  No breast changes or pain      Age/Gender Health Maintenance    Lipid - ; LDL 76; HDL 50;  (OCT 2018)  TC  177;  LDL 86; HDL 60;  (OCT 2017)  ; LDL 91; HDL 60;  (JAN 2016)    Lab Results   Component Value Date    CHOL 166 01/19/2022    CHOL 170 01/06/2021    CHOL 159 11/06/2019     Lab Results   Component Value Date    TRIG 173 (H) 01/19/2022    TRIG 188 01/06/2021    TRIG 129 11/06/2019     Lab Results   Component Value Date    HDL 52 01/19/2022    HDL 53 01/06/2021    HDL 54 11/06/2019     Lab Results   Component Value Date    LDLCALC 79 01/19/2022    LDLCALC 79 01/06/2021    LDLCALC 79 11/06/2019       DM Screen -   Lab Results   Component Value Date/Time    GLUCOSE 144 07/20/2022 08:47 AM     Lab Results   Component Value Date/Time    LABA1C 6.1 07/20/2022 08:47 AM    LABA1C 6.1 01/19/2022 09:52 AM    LABA1C 6.1 07/23/2021 08:26 AM    LABA1C 5.7 01/06/2021 09:22 AM    LABA1C 6.3 11/06/2019 08:09 AM    LABA1C 6.1 04/26/2019 08:38 AM     123 with a1c 6.4 (OCT 2018)  114 (JAN 2016) with A1C 6.0 (JAN 2016)  Glucose: 134/A1C 6.1 (SEPT 2016)      Colon Cancer Screening - NEG June 2016, repeat 10 years/ NEG SEPT 2019 with hyperplastic polyp SEPT 2019, repeat 10 years (Dr. Claudia Slaughter)   Lung Cancer Screening - never smoker    Tetanus - to get at pharmacy per medicare rules  Influenza Vaccine - UTD FALL 2021  Pneumonia Vaccine - UTD PCV 13 OCT 2016/PPV 23 in OCT 2018  Zoster - pt to get at pharmacy ([facility] [address] 2016)/to get at pharmacy per medicare rules    Breast Cancer Screening - Birads 3 JAN 2018, repeat 6 months, oncology managing. Bob Ramirez 3 July 2018 on Left with repeat 6 months, oncology managing/NEG bilat dx [facility] MARCH 2019/neg mammo MARCH 2020/ABN [facility] Spring 2021 with neg bx. Carlos Hastings [facility] NOV 2021  Osteoporosis Screening - Osteopenia MAY 2014/with stable osteopniea JAN 2018 repeat 3 years/Osteoporosis MARCH 2021  Prolia: 1st injection 2/11/2022      Specialist List  Ortho: Latricia Durbin  GI: [place]      Current Outpatient Medications   Medication Sig Dispense Refill    busPIRone (BUSPAR) 10 MG tablet TAKE 1 TABLET BY MOUTH TWICE DAILY 180 tablet 3    atorvastatin (LIPITOR) 20 MG tablet TAKE 1 TABLET BY MOUTH DAILY 90 tablet 3    ferrous sulfate (IRON 325) 325 (65 Fe) MG tablet Take 650 mg by mouth daily (with breakfast)      citalopram (CELEXA) 20 MG tablet TAKE 1 TABLET BY MOUTH EVERY DAY 90 tablet 3    metFORMIN (GLUCOPHAGE) 500 MG tablet TAKE 1 TABLET BY MOUTH TWICE DAILY WITH MEALS (Patient taking differently: daily (with breakfast)) 180 tablet 3    denosumab (PROLIA) 60 MG/ML SOSY SC injection Inject 1 mL into the skin every 6 months 180 mL 1    valsartan-hydroCHLOROthiazide (DIOVAN-HCT) 320-25 MG per tablet TAKE 1 TABLET BY MOUTH EVERY DAY 90 tablet 3    MULTIPLE VITAMIN PO Take by mouth daily      NONFORMULARY Take 150 mg by mouth Indications: Iron pills  ordered by GI      Misc Natural Products (GLUCOSAMINE CHOND DOUBLE STR) TABS Take by mouth      vitamin D3 (COLECALCIFEROL) 400 UNITS TABS Take by mouth daily      aspirin 81 MG tablet Take 81 mg by mouth daily. No current facility-administered medications for this visit. No orders of the defined types were placed in this encounter. All medications reviewed and reconciled, including OTC and herbal medications. Updated list given to patient. Patient Active Problem List    Diagnosis Date Noted    Iron deficiency anemia due to chronic blood loss 07/17/2019    Chronic anemia 12/05/2018    Obesity (BMI 30-39. 9)     Pure hypercholesterolemia 10/09/2016    Osteoarthritis of thoracolumbar spine      Following with Dr. Radha Fofana      Prediabetes     Major depression     Osteoporosis, post-menopausal     Encounter for monitoring aromatase inhibitor therapy 11/10/2015    OA (osteoarthritis) 10/11/2014    Essential (primary) hypertension     ALECIA (generalized anxiety disorder)     [facility] 04/23/2014     S/p lumpectomy. Following with oncology, Dr. Kathryn Fleming. Past Medical History:   Diagnosis Date    Breast cancer ([facility] 04/23/2014    Essential (primary) hypertension     ALECIA (generalized anxiety disorder)     History of hip replacement     History of therapeutic radiation     Major depression     Morbid obesity (HCC)     OA (osteoarthritis) 10/11/2014    Obesity (BMI 30-39. 9)     Osteoarthritis of thoracolumbar spine     Osteoporosis, post-menopausal     Prediabetes     Pure hypercholesterolemia 10/09/2016       Past Surgical History:   Procedure Laterality Date    BREAST LUMPECTOMY Right 2014    BREAST SURGERY Right     lumpectomy    CHOLECYSTECTOMY  2012    COLONOSCOPY      PATEL STEROTACTIC LOC BREAST BIOPSY LEFT Left 4/12/2021    benign    PATEL STEROTACTIC LOC BREAST BIOPSY RIGHT Right 10/24/2012    benign    TONSILLECTOMY      US BREAST NEEDLE BIOPSY LEFT Right 04/09/2014    Invasive ductal carcinoma       Allergies   Allergen Reactions    Sulfa Antibiotics Hives    Codeine Nausea And Vomiting Eszopiclone Nausea And Vomiting       Social History     Tobacco Use    Smoking status: Never    Smokeless tobacco: Never   Substance Use Topics    Alcohol use: Yes     Comment: little wine       Family History   Problem Relation Age of Onset    Cancer Mother         Lymphoma    Other Mother     Heart Disease Father         first bypass at age 43    Diabetes Father     Cancer Brother         Lymphoma    Colon Cancer Neg Hx     Breast Cancer Neg Hx          I have reviewed the patient's past medical history, past surgical history, allergies, medications, social and family history and I have made updates where appropriate. Review of Systems  Positive responses are highlighted in bold    Constitutional:  Fever, Chills, Night Sweats, Fatigue, Unexpected changes in weight  HENT:  Ear pain, Tinnitus, Nosebleeds, Trouble swallowing, Hearing loss, Sore throat  Cardiovascular:  Chest Pain, Palpitations, Orthopnea, Paroxysmal Nocturnal Dyspnea  Respiratory:  Cough, Wheezing, Shortness of breath, Chest tightness, Apnea  Gastrointestinal:  Nausea, Vomiting, Diarrhea, Constipation, Heartburn, Blood in stool  Genitourinary:  Difficulty or painful urination, Flank pain, Change in frequency, Urgency  Skin:  Color change, Rash, Itching, Wound  Musculoskeletal:  Joint pain, Back pain, Gait problems, Joint swelling, Myalgias  Neurological:  Dizziness, Headaches, Presyncope, Numbness, Seizures, Tremors  Endocrine:  Heat Intolerance, Cold Intolerance, Polydipsia, Polyphagia, Polyuria      PHYSICAL EXAM:  Vitals:    07/28/22 1003   BP: 124/76   Pulse: 72   Resp: 12   Temp: 97.7 °F (36.5 °C)   TempSrc: Oral   SpO2: 96%   Weight: 187 lb (84.8 kg)   Height: 5' 2\" (1.575 m)     Body mass index is 34.2 kg/m².        VS Reviewed  General Appearance: A&O x 3, No acute distress,well developed and well- nourished  Eyes: pupils equal, round, and reactive to light, extraocular eye movements intact, conjunctivae and eye lids without erythema  ENT: external ear and ear canal clear bilaterally, TMs intact and regular, nose without deformity, nasal mucosa and turbinates normal without polyps, oropharynx normal, dentition is normal for age  Neck: supple and non-tender without mass, no thyromegaly or thyroid nodules, no cervical lymphadenopathy  Pulmonary/Chest: clear to auscultation bilaterally- no wheezes, rales or rhonchi, normal air movement, no respiratory distress or retractions  Cardiovascular: S1 and S2 auscultated w/ RRR. No murmurs, rubs, clicks, or gallops, distal pulses intact. Abdomen: soft, non-tender, non-distended, bowl sounds physiologic,  no rebound or guarding, no masses or hernias noted. Liver and spleen without enlargement. Extremities: no cyanosis, clubbing or edema of the lower extremities. Skin: warm and dry, no rash or erythema  Psych: Affect appropriate. Mood euthymic. Thought process is normal without evidence of depression or psychosis. Good insight and appropriate interaction. Cognition and memory appear to be intact. ASSESSMENT & PLAN  1. Ceruminosis, bilateral    Improved after irrigation  Discussed avoiding q-tips  Discussed irrigation  Discussed debrox OTC    2. Essential (primary) hypertension    Stable  At goal  con't valsartan/hctz  Labs UTD    3. Prediabetes    Stable  con't metformin/lifestyle changes  Labs in 6 months    4. Obesity (BMI 30-39. 9)    Discussed wt loss strategies    5. Pure hypercholesterolemia    Stable  con't lipitor    6. Recurrent major depressive disorder, in full remission ([address]    Stable  con't celexa and buspar    7. ALECIA (generalized anxiety disorder)    As above    8. Osteoporosis, post-menopausal    Stable  Con't prolia  2nd injection scheduled for August.     9. Iron deficiency anemia due to chronic blood loss    Improved  Neg w/u  con't iron    10.  Malignant neoplasm of female breast, unspecified estrogen receptor status, unspecified laterality, unspecified site of breast [facility]    In remission  F/u onc    11. History of breast cancer      DISPOSITION    Return in about 6 months (around 1/28/2023) for follow-up on chronic medical conditions, sooner as needed. Timothy Slider released without restrictions. Future Appointments   Date Time Provider Ramses Melissa   8/12/2022  1:00 PM STR EXAM ROOM 5 STR OP NURS [place] HOD   4/26/2023 10:40 AM Reyna Vergara MD N Oncology [facility] - [place]     PATIENT COUNSELING    Barriers to learning and self management: none    Discussed use, benefit, and side effects of prescribed medications. Barriers to medication compliance addressed. All patient questions answered. Pt voiced understanding. Electronically signed by Linda Major DO on 7/28/2022 at 10:53 AM      Medicare Annual Wellness Visit    Zhane Guo is here for Medicare AWV, Hearing Loss (Bilateral), and Follow-up (Chronic issues as noted below)    Assessment & Plan   Medicare annual wellness visit, subsequent      Recommendations for Preventive Services Due: see orders and patient instructions/AVS.  Recommended screening schedule for the next 5-10 years is provided to the patient in written form: see Patient Instructions/AVS.     Return in about 6 months (around 1/28/2023) for follow-up on chronic medical conditions, sooner as needed. Subjective     Patient's complete Health Risk Assessment and screening values have been reviewed and are found in Flowsheets. The following problems were reviewed today and where indicated follow up appointments were made and/or referrals ordered.     Positive Risk Factor Screenings with Interventions:             General Health and ACP:  General  In general, how would you say your health is?: Good  In the past 7 days, have you experienced any of the following: New or Increased Pain, New or Increased Fatigue, Loneliness, Social Isolation, Stress or Anger?: (!) Yes  Select all that apply: (!) Stress  Do you get the social and emotional support that you need?: (!) No  Do you have a Living Will?: Yes    Advance Directives       Power of [address] Will ACP-Advance Directive ACP-Power of     Not on File Not on File Not on File Not on File          General Health Risk Interventions:  Stress: patient declines any further evaluation/treatment for this issue  Inadequate social/emotional support: patient declines any further intervention for this issue  No Living Will: ACP documents already completed- patient asked to provide copy to the office    Health Habits/Nutrition:  Physical Activity: Inactive    Days of Exercise per Week: 0 days    Minutes of Exercise per Session: 0 min     Have you lost any weight without trying in the past 3 months?: No  Body mass index: (!) 34.2  Have you seen the dentist within the past year?: Yes  Health Habits/Nutrition Interventions:  Nutritional issues:  educational materials for healthy, well-balanced diet provided    Hearing/Vision:  Do you or your family notice any trouble with your hearing that hasn't been managed with hearing aids?: No  Do you have difficulty driving, watching TV, or doing any of your daily activities because of your eyesight?: No  Have you had an eye exam within the past year?: (!) No  No results found. Hearing/Vision Interventions:  Vision concerns:  patient encouraged to make appointment with his/her eye specialist            Objective   Vitals:    07/28/22 1003   BP: 124/76   Pulse: 72   Resp: 12   Temp: 97.7 °F (36.5 °C)   TempSrc: Oral   SpO2: 96%   Weight: 187 lb (84.8 kg)   Height: 5' 2\" (1.575 m)      Body mass index is 34.2 kg/m². Allergies   Allergen Reactions    Sulfa Antibiotics Hives    Codeine Nausea And Vomiting    Eszopiclone Nausea And Vomiting     Prior to Visit Medications    Medication Sig Taking?  Authorizing Provider   busPIRone (BUSPAR) 10 MG tablet TAKE 1 TABLET BY MOUTH TWICE DAILY Yes TASIA [place] - MATHEW   atorvastatin (LIPITOR) 20 MG tablet TAKE 1 TABLET BY MOUTH DAILY Yes Charles Hernandez DO   ferrous sulfate (IRON 325) 325 (65 Fe) MG tablet Take 650 mg by mouth daily (with breakfast) Yes Historical Provider, MD   citalopram (CELEXA) 20 MG tablet TAKE 1 TABLET BY MOUTH EVERY DAY Yes Suraj Machado DO   metFORMIN (GLUCOPHAGE) 500 MG tablet TAKE 1 TABLET BY MOUTH TWICE DAILY WITH MEALS  Patient taking differently: daily (with breakfast) Yes Suraj Machado DO   denosumab (PROLIA) 60 MG/ML SOSY SC injection Inject 1 mL into the skin every 6 months Yes Suraj Machado DO   valsartan-hydroCHLOROthiazide (DIOVAN-HCT) 320-25 MG per tablet TAKE 1 TABLET BY MOUTH EVERY DAY Yes Suraj Machado DO   MULTIPLE VITAMIN PO Take by mouth daily Yes Historical Provider, MD   NONFORMULARY Take 150 mg by mouth Indications: Iron pills  ordered by GI Yes Historical Provider, MD   Misc Natural Products (GLUCOSAMINE CHOND DOUBLE STR) TABS Take by mouth Yes Historical Provider, MD   vitamin D3 (COLECALCIFEROL) 400 UNITS TABS Take by mouth daily Yes Historical Provider, MD   aspirin 81 MG tablet Take 81 mg by mouth daily. Yes Historical Provider, MD [place] (Including outside providers/suppliers regularly involved in providing care):   Patient Care Team:  Charles Hernandez DO as PCP - General (Family Medicine)  Charles Henrandez DO as PCP - [facility] Empaneled Provider     Reviewed and updated this visit:  Tobacco  Allergies  Meds  Problems  Med Hx  Surg Hx  Soc Hx  Fam Hx                 Care plan reviewed with and given to patient.        Electronically signed by Charles Hernandez DO on 7/28/2022 at 10:53 AM

## 2022-07-28 ENCOUNTER — OFFICE VISIT (OUTPATIENT)
Dept: FAMILY MEDICINE CLINIC | Age: 72
End: 2022-07-28
Payer: MEDICARE

## 2022-07-28 VITALS
SYSTOLIC BLOOD PRESSURE: 124 MMHG | WEIGHT: 187 LBS | TEMPERATURE: 97.7 F | DIASTOLIC BLOOD PRESSURE: 76 MMHG | RESPIRATION RATE: 12 BRPM | HEIGHT: 62 IN | OXYGEN SATURATION: 96 % | HEART RATE: 72 BPM | BODY MASS INDEX: 34.41 KG/M2

## 2022-07-28 DIAGNOSIS — R73.03 PREDIABETES: ICD-10-CM

## 2022-07-28 DIAGNOSIS — C50.919 MALIGNANT NEOPLASM OF FEMALE BREAST, UNSPECIFIED ESTROGEN RECEPTOR STATUS, UNSPECIFIED LATERALITY, UNSPECIFIED SITE OF BREAST (HCC): ICD-10-CM

## 2022-07-28 DIAGNOSIS — H61.23 CERUMINOSIS, BILATERAL: ICD-10-CM

## 2022-07-28 DIAGNOSIS — Z85.3 HISTORY OF BREAST CANCER: ICD-10-CM

## 2022-07-28 DIAGNOSIS — F33.42 RECURRENT MAJOR DEPRESSIVE DISORDER, IN FULL REMISSION (HCC): ICD-10-CM

## 2022-07-28 DIAGNOSIS — M81.0 OSTEOPOROSIS, POST-MENOPAUSAL: ICD-10-CM

## 2022-07-28 DIAGNOSIS — Z00.00 MEDICARE ANNUAL WELLNESS VISIT, SUBSEQUENT: Primary | ICD-10-CM

## 2022-07-28 DIAGNOSIS — F41.1 GAD (GENERALIZED ANXIETY DISORDER): ICD-10-CM

## 2022-07-28 DIAGNOSIS — E66.9 OBESITY (BMI 30-39.9): ICD-10-CM

## 2022-07-28 DIAGNOSIS — D50.0 IRON DEFICIENCY ANEMIA DUE TO CHRONIC BLOOD LOSS: ICD-10-CM

## 2022-07-28 DIAGNOSIS — E78.00 PURE HYPERCHOLESTEROLEMIA: ICD-10-CM

## 2022-07-28 DIAGNOSIS — I10 ESSENTIAL (PRIMARY) HYPERTENSION: ICD-10-CM

## 2022-07-28 PROCEDURE — 99213 OFFICE O/P EST LOW 20 MIN: CPT | Performed by: FAMILY MEDICINE

## 2022-07-28 PROCEDURE — 1123F ACP DISCUSS/DSCN MKR DOCD: CPT | Performed by: FAMILY MEDICINE

## 2022-07-28 PROCEDURE — G0439 PPPS, SUBSEQ VISIT: HCPCS | Performed by: FAMILY MEDICINE

## 2022-07-28 ASSESSMENT — PATIENT HEALTH QUESTIONNAIRE - PHQ9
SUM OF ALL RESPONSES TO PHQ QUESTIONS 1-9: 0
SUM OF ALL RESPONSES TO PHQ9 QUESTIONS 1 & 2: 0
2. FEELING DOWN, DEPRESSED OR HOPELESS: 0
SUM OF ALL RESPONSES TO PHQ QUESTIONS 1-9: 0
SUM OF ALL RESPONSES TO PHQ QUESTIONS 1-9: 0
9. THOUGHTS THAT YOU WOULD BE BETTER OFF DEAD, OR OF HURTING YOURSELF: 0
5. POOR APPETITE OR OVEREATING: 0
6. FEELING BAD ABOUT YOURSELF - OR THAT YOU ARE A FAILURE OR HAVE LET YOURSELF OR YOUR FAMILY DOWN: 0
4. FEELING TIRED OR HAVING LITTLE ENERGY: 0
7. TROUBLE CONCENTRATING ON THINGS, SUCH AS READING THE NEWSPAPER OR WATCHING TELEVISION: 0
3. TROUBLE FALLING OR STAYING ASLEEP: 0
1. LITTLE INTEREST OR PLEASURE IN DOING THINGS: 0
8. MOVING OR SPEAKING SO SLOWLY THAT OTHER PEOPLE COULD HAVE NOTICED. OR THE OPPOSITE, BEING SO FIGETY OR RESTLESS THAT YOU HAVE BEEN MOVING AROUND A LOT MORE THAN USUAL: 0
SUM OF ALL RESPONSES TO PHQ QUESTIONS 1-9: 0

## 2022-07-28 ASSESSMENT — LIFESTYLE VARIABLES: HOW OFTEN DO YOU HAVE A DRINK CONTAINING ALCOHOL: NEVER

## 2022-07-28 NOTE — PATIENT INSTRUCTIONS
Personalized Preventive Plan for Tomi Aguila - 7/28/2022  Medicare offers a range of preventive health benefits. Some of the tests and screenings are paid in full while other may be subject to a deductible, co-insurance, and/or copay. Some of these benefits include a comprehensive review of your medical history including lifestyle, illnesses that may run in your family, and various assessments and screenings as appropriate. After reviewing your medical record and screening and assessments performed today your provider may have ordered immunizations, labs, imaging, and/or referrals for you. A list of these orders (if applicable) as well as your Preventive Care list are included within your After Visit Summary for your review. Other Preventive Recommendations:    A preventive eye exam performed by an eye specialist is recommended every 1-2 years to screen for glaucoma; cataracts, macular degeneration, and other eye disorders. A preventive dental visit is recommended every 6 months. Try to get at least 150 minutes of exercise per week or 10,000 steps per day on a pedometer . Order or download the FREE \"Exercise & Physical Activity: Your Everyday Guide\" from [employer]. Call 5-967.119.1605 or search [employer] online. You need 0988-5974 mg of calcium and 4308-9438 IU of vitamin D per day. It is possible to meet your calcium requirement with diet alone, but a vitamin D supplement is usually necessary to meet this goal.  When exposed to the sun, use a sunscreen that protects against both UVA and UVB radiation with an SPF of 30 or greater. Reapply every 2 to 3 hours or after sweating, drying off with a towel, or swimming. Always wear a seat belt when traveling in a car. Always wear a helmet when riding a bicycle or motorcycle.

## 2022-08-12 ENCOUNTER — HOSPITAL ENCOUNTER (OUTPATIENT)
Dept: NURSING | Age: 72
Discharge: HOME | End: 2022-08-12
Payer: MEDICARE

## 2022-08-12 VITALS
TEMPERATURE: 98.8 F | RESPIRATION RATE: 18 BRPM | HEART RATE: 96 BPM | SYSTOLIC BLOOD PRESSURE: 152 MMHG | DIASTOLIC BLOOD PRESSURE: 77 MMHG

## 2022-08-12 DIAGNOSIS — M81.0 OSTEOPOROSIS, POST-MENOPAUSAL: Primary | ICD-10-CM

## 2022-08-12 PROCEDURE — 96372 THER/PROPH/DIAG INJ SC/IM: CPT

## 2022-08-12 PROCEDURE — 6360000002 HC RX W HCPCS: Performed by: FAMILY MEDICINE

## 2022-08-12 RX ORDER — ACETAMINOPHEN 325 MG/1
650 TABLET ORAL
OUTPATIENT
Start: 2023-02-10

## 2022-08-12 RX ORDER — DIPHENHYDRAMINE HYDROCHLORIDE 50 MG/ML
50 INJECTION INTRAMUSCULAR; INTRAVENOUS
OUTPATIENT
Start: 2023-02-10

## 2022-08-12 RX ORDER — SODIUM CHLORIDE 9 MG/ML
INJECTION, SOLUTION INTRAVENOUS CONTINUOUS
OUTPATIENT
Start: 2023-02-10

## 2022-08-12 RX ORDER — ONDANSETRON 2 MG/ML
8 INJECTION INTRAMUSCULAR; INTRAVENOUS
OUTPATIENT
Start: 2023-02-10

## 2022-08-12 RX ORDER — ALBUTEROL SULFATE 90 UG/1
4 AEROSOL, METERED RESPIRATORY (INHALATION) PRN
OUTPATIENT
Start: 2023-02-10

## 2022-08-12 RX ADMIN — DENOSUMAB 60 MG: 60 INJECTION SUBCUTANEOUS at 12:53

## 2022-08-12 NOTE — PROGRESS NOTES
1248 pt arrives ambulatory for prolia injection. Injection explained and questions answered. PT RIGHTS AND RESPONSIBILITIES OFFERED TO PT.   1253 injection given. Pt tolerated it well with no complaints. Pt discharged ambulatory with instructions with no complaints.                _m___ Safety:       (Environmental)  [place] to environment  Ensure ID band is correct and in place/ allergy band as needed  Assess for fall risk  Initiate fall precautions as applicable (fall band, side rails, etc.)  Call light within reach  Bed in low position/ wheels locked    _m___ Pain:       Assess pain level and characteristics  Administer analgesics as ordered  Assess effectiveness of pain management and report to MD as needed    __m__ Knowledge Deficit:  Assess baseline knowledge  Provide teaching at level of understanding  Provide teaching via preferred learning method  Evaluate teaching effectiveness    __m__ Hemodynamic/Respiratory Status:       (Pre and Post Procedure Monitoring)  Assess/Monitor vital signs and LOC  Assess Baseline SpO2 prior to any sedation  Obtain weight/height  Assess vital signs/ LOC until patient meets discharge criteria  Monitor procedure site and notify MD of any issues

## 2022-09-12 RX ORDER — VALSARTAN AND HYDROCHLOROTHIAZIDE 320; 25 MG/1; MG/1
TABLET, FILM COATED ORAL
Qty: 90 TABLET | Refills: 3 | Status: SHIPPED | OUTPATIENT
Start: 2022-09-12

## 2022-09-12 NOTE — TELEPHONE ENCOUNTER
Recent Visits  Date Type Provider Dept   07/28/22 Office Visit Addison Navarrete, DO [facility]   03/03/22 Office Visit Addison Navarrete, DO [facility]   01/27/22 Office Visit Addison Navarrete, DO [facility]   07/27/21 Office Visit Addison Navarrete, DO [facility]   Showing recent visits within past 540 days with a meds authorizing provider and meeting all other requirements  Future Appointments  No visits were found meeting these conditions.   Showing future appointments within next 150 days with a meds authorizing provider and meeting all other requirements        Future Appointments   Date Time Provider Ramses Melissa   4/26/2023 10:40 AM Karie Joy MD N Oncology [facility] [place]

## 2022-10-21 DIAGNOSIS — E66.9 OBESITY (BMI 30-39.9): ICD-10-CM

## 2022-10-21 DIAGNOSIS — R73.03 PREDIABETES: Chronic | ICD-10-CM

## 2022-10-21 NOTE — TELEPHONE ENCOUNTER
Future Appointments   Date Time Provider Ramses Belén   4/26/2023 10:40 AM Kayden Spencer [facility]

## 2023-02-17 DIAGNOSIS — F41.9 ANXIETY: ICD-10-CM

## 2023-02-17 DIAGNOSIS — F33.0 MAJOR DEPRESSIVE DISORDER, RECURRENT EPISODE, MILD (HCC): ICD-10-CM

## 2023-02-20 RX ORDER — CITALOPRAM 20 MG/1
TABLET ORAL
Qty: 90 TABLET | Refills: 3 | Status: SHIPPED | OUTPATIENT
Start: 2023-02-20

## 2023-02-20 NOTE — TELEPHONE ENCOUNTER
Recent Visits  Date Type Provider Dept   07/28/22 Office Visit Luz Marinakely Skydiana, DO [facility]   03/03/22 Office Visit Luz Marinakely Betancourt, DO [facility]   01/27/22 Office Visit Luz Marinakely Serafin, DO [facility]   Showing recent visits within past 540 days with a meds authorizing provider and meeting all other requirements  Future Appointments  No visits were found meeting these conditions.   Showing future appointments within next 150 days with a meds authorizing provider and meeting all other requirements     Future Appointments   Date Time Provider Ramses Melissa   4/26/2023 10:40 AM Ariadne Rodriguez MD N Oncology [facility] [place]

## 2023-03-29 ENCOUNTER — TELEPHONE (OUTPATIENT)
Dept: ONCOLOGY | Age: 73
End: 2023-03-29

## 2023-03-29 NOTE — TELEPHONE ENCOUNTER
Patient has an appt on 4/26/23. Was unable to reach patient, unable to leave a voice message that this appt has been rescheduled to 4/27/23 at 11:40 due to a pt.  Needing treatment on 4/26/23

## 2023-04-25 NOTE — PROGRESS NOTES
found for: PERCENTFE  TIBC:    Lab Results   Component Value Date/Time    TIBC 391 05/05/2020 08:51 AM     FERRITIN:    Lab Results   Component Value Date/Time    FERRITIN 224 05/05/2020 08:51 AM             IMAGING:  PROCEDURE: [facility] MAXIMINO DIGITAL SCREEN SELF REFERRAL W OR WO CAD BILATERAL 4/26/23     CLINICAL INFORMATION: Visit for screening mammogram. Tomosynthesis. CLINICAL:     Self-referred screening mammogram  PATIENT MEDICAL HISTORY:  Medical history includes breast cancer. FAMILY HISTORY:   No relevant family history has been documented for this patient. RISK VALUES: Tyrer-Cuzick 10yr.: NA%, Tyrer-Cuzick life:   NA%     COMPARISON: 4/25/2022, 3/30/2021, 3/17/2020. TECHNIQUE: Bilateral CC and MLO views of the breasts were obtained. 3D tomosynthesis was utilized. CAD was utilized. BREAST COMPOSITION: The tissue of the breast(s) is predominantly fatty. FINDINGS: There are bilateral benign-appearing calcifications. There is a biopsy clip in each breast. There are benign-appearing left axillary lymph nodes. No significant masses, calcifications, or other findings are seen in the breast(s). There has been no significant interval change. IMPRESSION:  No mammographic evidence of malignancy. A 1 year screening mammogram is recommended. [facility] MAXIMINO DIGITAL SCREEN BILATERAL  Result Date: 4/25/2022  No mammographic evidence of malignancy. A 1 year screening mammogram is recommended. A result letter will be sent to the patient. She will also receive a reminder 1 month prior to her next mammogram. . BI-RADS CATEGORY 2: BENIGN FINDINGS. Management Recommendation: Routine annual mammography. **This report has been created using voice recognition software. It may contain minor errors which are inherent in voice recognition technology. ** Final report electronically signed by Dr. Eugenie Mobley on 4/25/2022 1:24 PM     DEXA scan 3/30/2021-osteoporosis.     PROCEDURES:  None recent    PATHOLOGY:   See

## 2023-04-26 ENCOUNTER — HOSPITAL ENCOUNTER (OUTPATIENT)
Dept: WOMENS IMAGING | Age: 73
Discharge: HOME | End: 2023-04-26
Payer: MEDICARE

## 2023-04-26 DIAGNOSIS — Z12.31 VISIT FOR SCREENING MAMMOGRAM: ICD-10-CM

## 2023-04-26 PROCEDURE — 77063 BREAST TOMOSYNTHESIS BI: CPT

## 2023-04-27 ENCOUNTER — HOSPITAL ENCOUNTER (OUTPATIENT)
Dept: INFUSION THERAPY | Age: 73
Discharge: HOME | End: 2023-04-27
Payer: MEDICARE

## 2023-04-27 ENCOUNTER — OFFICE VISIT (OUTPATIENT)
Dept: ONCOLOGY | Age: 73
End: 2023-04-27
Payer: MEDICARE

## 2023-04-27 VITALS
RESPIRATION RATE: 16 BRPM | OXYGEN SATURATION: 95 % | HEART RATE: 79 BPM | TEMPERATURE: 97.7 F | SYSTOLIC BLOOD PRESSURE: 140 MMHG | DIASTOLIC BLOOD PRESSURE: 82 MMHG

## 2023-04-27 VITALS
DIASTOLIC BLOOD PRESSURE: 82 MMHG | WEIGHT: 192.6 LBS | SYSTOLIC BLOOD PRESSURE: 140 MMHG | TEMPERATURE: 97.7 F | BODY MASS INDEX: 35.23 KG/M2 | OXYGEN SATURATION: 95 % | RESPIRATION RATE: 16 BRPM | HEART RATE: 79 BPM

## 2023-04-27 DIAGNOSIS — C50.911 MALIGNANT NEOPLASM OF RIGHT BREAST IN FEMALE, ESTROGEN RECEPTOR POSITIVE, UNSPECIFIED SITE OF BREAST (HCC): Primary | ICD-10-CM

## 2023-04-27 DIAGNOSIS — Z17.0 MALIGNANT NEOPLASM OF RIGHT BREAST IN FEMALE, ESTROGEN RECEPTOR POSITIVE, UNSPECIFIED SITE OF BREAST (HCC): ICD-10-CM

## 2023-04-27 DIAGNOSIS — C50.911 MALIGNANT NEOPLASM OF RIGHT BREAST IN FEMALE, ESTROGEN RECEPTOR POSITIVE, UNSPECIFIED SITE OF BREAST (HCC): ICD-10-CM

## 2023-04-27 DIAGNOSIS — Z17.0 MALIGNANT NEOPLASM OF RIGHT BREAST IN FEMALE, ESTROGEN RECEPTOR POSITIVE, UNSPECIFIED SITE OF BREAST (HCC): Primary | ICD-10-CM

## 2023-04-27 DIAGNOSIS — M81.0 OSTEOPOROSIS, UNSPECIFIED OSTEOPOROSIS TYPE, UNSPECIFIED PATHOLOGICAL FRACTURE PRESENCE: ICD-10-CM

## 2023-04-27 LAB
ABSOLUTE IMMATURE GRANULOCYTE: 0.02 THOU/MM3 (ref 0–0.07)
ALBUMIN SERPL BCG-MCNC: 4.6 G/DL (ref 3.5–5.1)
ALP SERPL-CCNC: 83 U/L (ref 38–126)
ALT SERPL W/O P-5'-P-CCNC: 45 U/L (ref 11–66)
AST SERPL-CCNC: 38 U/L (ref 5–40)
BASOPHILS ABSOLUTE: 0.1 THOU/MM3 (ref 0–0.1)
BASOPHILS NFR BLD AUTO: 1 % (ref 0–3)
BILIRUB CONJ SERPL-MCNC: < 0.2 MG/DL (ref 0–0.3)
BILIRUB SERPL-MCNC: 0.6 MG/DL (ref 0.3–1.2)
BUN BLDP-MCNC: 12 MG/DL (ref 8–26)
CHLORIDE BLD-SCNC: 103 MEQ/L (ref 98–109)
CREAT BLD-MCNC: 0.6 MG/DL (ref 0.5–1.2)
EOSINOPHIL NFR BLD AUTO: 2 % (ref 0–4)
EOSINOPHILS ABSOLUTE: 0.1 THOU/MM3 (ref 0–0.4)
ERYTHROCYTE [DISTWIDTH] IN BLOOD BY AUTOMATED COUNT: 12.1 % (ref 11.5–14.5)
GFR SERPL CREATININE-BSD FRML MDRD: > 60 ML/MIN/1.73M2
GLUCOSE BLD-MCNC: 106 MG/DL (ref 70–108)
HCT VFR BLD AUTO: 43.7 % (ref 37–47)
HGB BLD-MCNC: 14.6 GM/DL (ref 12–16)
IMMATURE GRANULOCYTES: 0 %
IONIZED CALCIUM, WHOLE BLOOD: 1.21 MMOL/L (ref 1.12–1.32)
LYMPHOCYTES ABSOLUTE: 2.4 THOU/MM3 (ref 1–4.8)
LYMPHOCYTES NFR BLD AUTO: 38 % (ref 15–47)
MCH RBC QN AUTO: 31.3 PG (ref 26–33)
MCHC RBC AUTO-ENTMCNC: 33.4 GM/DL (ref 32.2–35.5)
MCV RBC AUTO: 94 FL (ref 81–99)
MONOCYTES ABSOLUTE: 0.6 THOU/MM3 (ref 0.4–1.3)
MONOCYTES NFR BLD AUTO: 10 % (ref 0–12)
NEUTROPHILS NFR BLD AUTO: 49 % (ref 43–75)
PLATELET # BLD AUTO: 267 THOU/MM3 (ref 130–400)
PMV BLD AUTO: 9.8 FL (ref 9.4–12.4)
POTASSIUM BLD-SCNC: 4.2 MEQ/L (ref 3.5–4.9)
PROT SERPL-MCNC: 7 G/DL (ref 6.1–8)
RBC # BLD AUTO: 4.67 MILL/MM3 (ref 4.2–5.4)
SEGMENTED NEUTROPHILS ABSOLUTE COUNT: 3.1 THOU/MM3 (ref 1.8–7.7)
SODIUM BLD-SCNC: 142 MEQ/L (ref 138–146)
TOTAL CO2, WHOLE BLOOD: 30 MEQ/L (ref 23–33)
WBC # BLD AUTO: 6.3 THOU/MM3 (ref 4.8–10.8)

## 2023-04-27 PROCEDURE — 99211 OFF/OP EST MAY X REQ PHY/QHP: CPT

## 2023-04-27 PROCEDURE — 85025 COMPLETE CBC W/AUTO DIFF WBC: CPT

## 2023-04-27 PROCEDURE — 36415 COLL VENOUS BLD VENIPUNCTURE: CPT

## 2023-04-27 PROCEDURE — 80047 BASIC METABLC PNL IONIZED CA: CPT

## 2023-04-27 PROCEDURE — 1123F ACP DISCUSS/DSCN MKR DOCD: CPT | Performed by: INTERNAL MEDICINE

## 2023-04-27 PROCEDURE — 3077F SYST BP >= 140 MM HG: CPT | Performed by: INTERNAL MEDICINE

## 2023-04-27 PROCEDURE — 3079F DIAST BP 80-89 MM HG: CPT | Performed by: INTERNAL MEDICINE

## 2023-04-27 PROCEDURE — 99214 OFFICE O/P EST MOD 30 MIN: CPT | Performed by: INTERNAL MEDICINE

## 2023-04-27 PROCEDURE — 80076 HEPATIC FUNCTION PANEL: CPT

## 2023-04-27 RX ORDER — FAMOTIDINE 10 MG/ML
20 INJECTION, SOLUTION INTRAVENOUS ONCE
OUTPATIENT
Start: 2023-04-27 | End: 2023-04-27

## 2023-04-27 RX ORDER — SODIUM CHLORIDE 9 MG/ML
INJECTION, SOLUTION INTRAVENOUS CONTINUOUS
OUTPATIENT
Start: 2023-04-27

## 2023-04-27 RX ORDER — DIPHENHYDRAMINE HYDROCHLORIDE 50 MG/ML
50 INJECTION INTRAMUSCULAR; INTRAVENOUS
OUTPATIENT
Start: 2023-04-27

## 2023-04-27 RX ORDER — ONDANSETRON 2 MG/ML
8 INJECTION INTRAMUSCULAR; INTRAVENOUS
OUTPATIENT
Start: 2023-04-27

## 2023-04-27 RX ORDER — ALBUTEROL SULFATE 90 UG/1
4 AEROSOL, METERED RESPIRATORY (INHALATION) PRN
OUTPATIENT
Start: 2023-04-27

## 2023-04-27 RX ORDER — ACETAMINOPHEN 325 MG/1
650 TABLET ORAL
OUTPATIENT
Start: 2023-04-27

## 2023-04-27 RX ORDER — EPINEPHRINE 1 MG/ML
0.3 INJECTION, SOLUTION, CONCENTRATE INTRAVENOUS PRN
OUTPATIENT
Start: 2023-04-27

## 2023-04-27 NOTE — PATIENT INSTRUCTIONS
Please confirm, I believe patient's last Prolia was August 2022. If confirmed, she is due. Please schedule. Should be every 6 months. CBC and CMP today  Routine follow-up in 6 months. May be due for Prolia again at that time  Please schedule DEXA scan to be done within the next month.   Ordered

## 2023-04-29 DIAGNOSIS — E78.00 PURE HYPERCHOLESTEROLEMIA: Chronic | ICD-10-CM

## 2023-05-01 ENCOUNTER — TELEPHONE (OUTPATIENT)
Dept: FAMILY MEDICINE CLINIC | Age: 73
End: 2023-05-01

## 2023-05-01 RX ORDER — ATORVASTATIN CALCIUM 20 MG/1
TABLET, FILM COATED ORAL
Qty: 90 TABLET | Refills: 3 | Status: SHIPPED | OUTPATIENT
Start: 2023-05-01

## 2023-05-01 NOTE — TELEPHONE ENCOUNTER
----- Message from Madalyn Buchanan, DO sent at 4/30/2023  8:50 AM EDT -----  Please let pt know that mammogram is normal. Let me know if questions, thanks!

## 2023-05-01 NOTE — TELEPHONE ENCOUNTER
Recent Visits  Date Type Provider Dept   07/28/22 Office Visit Kurt Feliberto, DO [facility]   03/03/22 Office Visit Kurt Feliberto, DO [facility]   01/27/22 Office Visit Kurt Feliberto, DO [facility]   Showing recent visits within past 540 days with a meds authorizing provider and meeting all other requirements  Future Appointments  No visits were found meeting these conditions.   Showing future appointments within next 150 days with a meds authorizing provider and meeting all other requirements    Future Appointments   Date Time Provider Ramses Melissa   5/10/2023 11:00 AM [facility] W[facility] Radiolog   10/26/2023 11:20 AM Benito Ha MD N Oncology [facility] [address]

## 2023-05-10 ENCOUNTER — HOSPITAL ENCOUNTER (OUTPATIENT)
Dept: WOMENS IMAGING | Age: 73
Discharge: HOME | End: 2023-05-10
Payer: MEDICARE

## 2023-05-10 DIAGNOSIS — M81.0 OSTEOPOROSIS, UNSPECIFIED OSTEOPOROSIS TYPE, UNSPECIFIED PATHOLOGICAL FRACTURE PRESENCE: ICD-10-CM

## 2023-05-10 DIAGNOSIS — Z17.0 MALIGNANT NEOPLASM OF RIGHT BREAST IN FEMALE, ESTROGEN RECEPTOR POSITIVE, UNSPECIFIED SITE OF BREAST (HCC): ICD-10-CM

## 2023-05-10 DIAGNOSIS — C50.911 MALIGNANT NEOPLASM OF RIGHT BREAST IN FEMALE, ESTROGEN RECEPTOR POSITIVE, UNSPECIFIED SITE OF BREAST (HCC): ICD-10-CM

## 2023-05-10 PROCEDURE — 77080 DXA BONE DENSITY AXIAL: CPT

## 2023-05-15 ENCOUNTER — TELEPHONE (OUTPATIENT)
Dept: FAMILY MEDICINE CLINIC | Age: 73
End: 2023-05-15

## 2023-05-15 NOTE — TELEPHONE ENCOUNTER
Pt needing an appointment due to [facility] requiring pt's to have 2 visits per a year. First one between 1/1-6/30 and second between 7/1-12-31. Pt has an appointment 7/31/23. Okay to move patient up before 6/30/23?

## 2023-05-16 ENCOUNTER — OFFICE VISIT (OUTPATIENT)
Dept: FAMILY MEDICINE CLINIC | Age: 73
End: 2023-05-16
Payer: MEDICARE

## 2023-05-16 VITALS
RESPIRATION RATE: 16 BRPM | SYSTOLIC BLOOD PRESSURE: 120 MMHG | HEART RATE: 68 BPM | DIASTOLIC BLOOD PRESSURE: 70 MMHG | HEIGHT: 63 IN | WEIGHT: 194.4 LBS | OXYGEN SATURATION: 98 % | TEMPERATURE: 97.8 F | BODY MASS INDEX: 34.45 KG/M2

## 2023-05-16 DIAGNOSIS — I10 ESSENTIAL (PRIMARY) HYPERTENSION: Primary | ICD-10-CM

## 2023-05-16 DIAGNOSIS — Z85.3 HISTORY OF BREAST CANCER: ICD-10-CM

## 2023-05-16 DIAGNOSIS — D50.0 IRON DEFICIENCY ANEMIA DUE TO CHRONIC BLOOD LOSS: ICD-10-CM

## 2023-05-16 DIAGNOSIS — E78.00 PURE HYPERCHOLESTEROLEMIA: ICD-10-CM

## 2023-05-16 DIAGNOSIS — F33.42 RECURRENT MAJOR DEPRESSIVE DISORDER, IN FULL REMISSION (HCC): ICD-10-CM

## 2023-05-16 DIAGNOSIS — F41.1 GAD (GENERALIZED ANXIETY DISORDER): ICD-10-CM

## 2023-05-16 DIAGNOSIS — E66.9 OBESITY (BMI 30-39.9): ICD-10-CM

## 2023-05-16 DIAGNOSIS — C50.919 MALIGNANT NEOPLASM OF FEMALE BREAST, UNSPECIFIED ESTROGEN RECEPTOR STATUS, UNSPECIFIED LATERALITY, UNSPECIFIED SITE OF BREAST (HCC): ICD-10-CM

## 2023-05-16 DIAGNOSIS — R73.9 HYPERGLYCEMIA: ICD-10-CM

## 2023-05-16 DIAGNOSIS — M81.0 OSTEOPOROSIS, POST-MENOPAUSAL: ICD-10-CM

## 2023-05-16 DIAGNOSIS — R73.03 PREDIABETES: ICD-10-CM

## 2023-05-16 PROCEDURE — 99214 OFFICE O/P EST MOD 30 MIN: CPT | Performed by: FAMILY MEDICINE

## 2023-05-16 PROCEDURE — 3074F SYST BP LT 130 MM HG: CPT | Performed by: FAMILY MEDICINE

## 2023-05-16 PROCEDURE — 1123F ACP DISCUSS/DSCN MKR DOCD: CPT | Performed by: FAMILY MEDICINE

## 2023-05-16 PROCEDURE — 3078F DIAST BP <80 MM HG: CPT | Performed by: FAMILY MEDICINE

## 2023-05-16 SDOH — ECONOMIC STABILITY: FOOD INSECURITY: WITHIN THE PAST 12 MONTHS, YOU WORRIED THAT YOUR FOOD WOULD RUN OUT BEFORE YOU GOT MONEY TO BUY MORE.: NEVER TRUE

## 2023-05-16 SDOH — ECONOMIC STABILITY: FOOD INSECURITY: WITHIN THE PAST 12 MONTHS, THE FOOD YOU BOUGHT JUST DIDN'T LAST AND YOU DIDN'T HAVE MONEY TO GET MORE.: NEVER TRUE

## 2023-05-16 SDOH — ECONOMIC STABILITY: INCOME INSECURITY: HOW HARD IS IT FOR YOU TO PAY FOR THE VERY BASICS LIKE FOOD, HOUSING, MEDICAL CARE, AND HEATING?: NOT HARD AT ALL

## 2023-05-16 ASSESSMENT — PATIENT HEALTH QUESTIONNAIRE - PHQ9
7. TROUBLE CONCENTRATING ON THINGS, SUCH AS READING THE NEWSPAPER OR WATCHING TELEVISION: 0
SUM OF ALL RESPONSES TO PHQ QUESTIONS 1-9: 0
3. TROUBLE FALLING OR STAYING ASLEEP: 0
SUM OF ALL RESPONSES TO PHQ9 QUESTIONS 1 & 2: 0
SUM OF ALL RESPONSES TO PHQ QUESTIONS 1-9: 0
8. MOVING OR SPEAKING SO SLOWLY THAT OTHER PEOPLE COULD HAVE NOTICED. OR THE OPPOSITE, BEING SO FIGETY OR RESTLESS THAT YOU HAVE BEEN MOVING AROUND A LOT MORE THAN USUAL: 0
10. IF YOU CHECKED OFF ANY PROBLEMS, HOW DIFFICULT HAVE THESE PROBLEMS MADE IT FOR YOU TO DO YOUR WORK, TAKE CARE OF THINGS AT HOME, OR GET ALONG WITH OTHER PEOPLE: 0
2. FEELING DOWN, DEPRESSED OR HOPELESS: 0
9. THOUGHTS THAT YOU WOULD BE BETTER OFF DEAD, OR OF HURTING YOURSELF: 0
5. POOR APPETITE OR OVEREATING: 0
SUM OF ALL RESPONSES TO PHQ QUESTIONS 1-9: 0
SUM OF ALL RESPONSES TO PHQ QUESTIONS 1-9: 0
1. LITTLE INTEREST OR PLEASURE IN DOING THINGS: 0
6. FEELING BAD ABOUT YOURSELF - OR THAT YOU ARE A FAILURE OR HAVE LET YOURSELF OR YOUR FAMILY DOWN: 0
4. FEELING TIRED OR HAVING LITTLE ENERGY: 0

## 2023-05-16 NOTE — PROGRESS NOTES
Chief Complaint   Patient presents with    Follow-up     Chronic issues noted below     History obtained from the patient. SUBJECTIVE:  Betsy Razo is a 74 y.o. female that presents today for     -HTN:    HPI:     Taking meds as prescribed ?: yes  Tolerating well ?: yes  Side Effects ?: denies  BP at home ?: <140/90  Working on TLCS ?: yes  Chest Pain/SOB/Palpitations? denies    BP Readings from Last 3 Encounters:   05/16/23 120/70   04/27/23 (!) 140/82   04/27/23 (!) 140/82       -PreDM PRIOR VISIT: wts going up d/t estrogen blocker. Due for labs. Trying to watch diet. UPDATE PRIOR VISIT: wts down a few labs. Had labs done, those are pending. UPDATE PRIOR VISIT: wts up a bit. Higher d/t estrogen blockers. Trying to make diet changes. a1c stable. UPDATE PRIOR VISIT: wts stable. Labs a little worse. On meformin. Working on life style changes. UPDATE PRIOR VISIT: wts improved. Labs stable. On metformin. Working on life style changes. UPDATE PRIOr VISIT:   Labs stable  wts about the same  On metfromin  Working on life style changes     UPDATE PRIOR VISIT:   Due for labs  On metformin  Working on lifestyle changes     UPDATE TODAY:   Labs stable  On metformin yet   Doing lifestyle changes yet    Lab Results   Component Value Date/Time    LABA1C 6.1 07/20/2022 08:47 AM    LABA1C 6.1 01/19/2022 09:52 AM    LABA1C 6.1 07/23/2021 08:26 AM    LABA1C 5.7 01/06/2021 09:22 AM    LABA1C 6.3 11/06/2019 08:09 AM    LABA1C 6.1 04/26/2019 08:38 AM     Wt Readings from Last 3 Encounters:   05/16/23 194 lb 6.4 oz (88.2 kg)   04/27/23 192 lb 9.6 oz (87.4 kg)   07/28/22 187 lb (84.8 kg)       -HLD:    HPI:    Taking meds as prescribed ?: yes  Tolerating well ?: yes  Side Effects ?: denies  Muscle Pain?: denies  Working on TLCS ?: yes      -Depression/Anxiety: depression and anxiety doing well on buspar/celexa.  No SI/HI      -Osteoporosis:    Was on alendronate, on for ~ 7 years  Last dexa changed from

## 2023-05-16 NOTE — TELEPHONE ENCOUNTER
Future Appointments   Date Time Provider Ramses Melissa   5/16/2023 12:20 PM Jose Angel Alcazar DO [facility] [address] [facility]   7/31/2023 12:40 PM Jose Angel Alcazar DO [facility] [address]   10/26/2023 11:20 AM Clearance MD ADRIANO Kim [facility]     Patient has been informed and voiced understanding

## 2023-05-16 NOTE — TELEPHONE ENCOUNTER
----- Message from Sandi Marroquin sent at 5/16/2023  9:13 AM EDT -----  Subject: Message to Provider    QUESTIONS  Information for Provider?  Patient returning phone call she received from   office staff, tried reaching out to office staff no answer patient would   like call, back  ---------------------------------------------------------------------------  --------------  [id number]  [phone]; OK to leave message on voicemail  ---------------------------------------------------------------------------  --------------  SCRIPT ANSWERS  undefined

## 2023-05-17 RX ORDER — SODIUM CHLORIDE 9 MG/ML
INJECTION, SOLUTION INTRAVENOUS CONTINUOUS
OUTPATIENT
Start: 2023-05-22

## 2023-05-17 RX ORDER — DIPHENHYDRAMINE HYDROCHLORIDE 50 MG/ML
50 INJECTION INTRAMUSCULAR; INTRAVENOUS
OUTPATIENT
Start: 2023-05-22

## 2023-05-17 RX ORDER — EPINEPHRINE 1 MG/ML
0.3 INJECTION, SOLUTION, CONCENTRATE INTRAVENOUS PRN
OUTPATIENT
Start: 2023-05-22

## 2023-05-19 DIAGNOSIS — F41.1 GAD (GENERALIZED ANXIETY DISORDER): Chronic | ICD-10-CM

## 2023-05-19 RX ORDER — BUSPIRONE HYDROCHLORIDE 10 MG/1
TABLET ORAL
Qty: 180 TABLET | Refills: 3 | Status: SHIPPED | OUTPATIENT
Start: 2023-05-19

## 2023-05-19 NOTE — TELEPHONE ENCOUNTER
Recent Visits  Date Type Provider Dept   05/16/23 Office Visit Sebastián Eliza, DO [facility]   07/28/22 Office Visit Sebastián Eliza, DO [facility]   03/03/22 Office Visit Sebastián Eliza, DO [facility]   01/27/22 Office Visit Sebastián Eliza, DO [facility]   Showing recent visits within past 540 days with a meds authorizing provider and meeting all other requirements  Future Appointments  No visits were found meeting these conditions.   Showing future appointments within next 150 days with a meds authorizing provider and meeting all other requirements

## 2023-05-31 ENCOUNTER — HOSPITAL ENCOUNTER (OUTPATIENT)
Dept: NURSING | Age: 73
Discharge: HOME | End: 2023-05-31
Payer: MEDICARE

## 2023-05-31 VITALS
OXYGEN SATURATION: 97 % | SYSTOLIC BLOOD PRESSURE: 138 MMHG | DIASTOLIC BLOOD PRESSURE: 64 MMHG | HEART RATE: 71 BPM | TEMPERATURE: 98 F | RESPIRATION RATE: 18 BRPM

## 2023-05-31 DIAGNOSIS — M81.0 OSTEOPOROSIS, POST-MENOPAUSAL: Primary | ICD-10-CM

## 2023-05-31 PROCEDURE — 6360000002 HC RX W HCPCS: Performed by: FAMILY MEDICINE

## 2023-05-31 PROCEDURE — 96372 THER/PROPH/DIAG INJ SC/IM: CPT

## 2023-05-31 RX ORDER — DIPHENHYDRAMINE HYDROCHLORIDE 50 MG/ML
50 INJECTION INTRAMUSCULAR; INTRAVENOUS
OUTPATIENT
Start: 2023-11-29

## 2023-05-31 RX ORDER — SODIUM CHLORIDE 9 MG/ML
INJECTION, SOLUTION INTRAVENOUS CONTINUOUS
OUTPATIENT
Start: 2023-11-29

## 2023-05-31 RX ORDER — EPINEPHRINE 1 MG/ML
0.3 INJECTION, SOLUTION INTRAMUSCULAR; SUBCUTANEOUS PRN
OUTPATIENT
Start: 2023-11-29

## 2023-05-31 RX ADMIN — DENOSUMAB 60 MG: 60 INJECTION SUBCUTANEOUS at 12:14

## 2023-05-31 ASSESSMENT — PAIN - FUNCTIONAL ASSESSMENT: PAIN_FUNCTIONAL_ASSESSMENT: 0-10

## 2023-05-31 NOTE — DISCHARGE INSTRUCTIONS
PROLIA DISCHARGE INSTRUCTIONS    DIET:  Drink extra fluids. ACTIVITY : Usual     1. Continue to take adequate calcium and vitamin D. Recommend calcium 1200 mg per day and vitamin D 400 - 800 units daily. 2.  If flu like symptoms - fever, muscle soreness or headache take Tylenol or Ibuprofen. Symptoms may last up to 3 days, sometimes 7 - 14 days. 3.  Follow up with ordering physician. 4.  Any problems return to emergency room. 5.  Continue usual medication. MEDICATION GIVEN TWICE A YEAR. YOUR NEXT PORLIA INJECTION IS SCHEDULED FOR 12/01/2023.   PLEASE CALL IF YOU HAVE ANY QUESTIONS [phone]

## 2023-05-31 NOTE — PROGRESS NOTES
__m__ Safety:       (Environmental)  [place] to environment  Ensure ID band is correct and in place/ allergy band as needed  Assess for fall risk  Initiate fall precautions as applicable (fall band, side rails, etc.)  Call light within reach  Bed in low position/ wheels locked    ___m_ Pain:       Assess pain level and characteristics  Administer analgesics as ordered  Assess effectiveness of pain management and report to MD as needed    _m___ Knowledge Deficit:  Assess baseline knowledge  Provide teaching at level of understanding  Provide teaching via preferred learning method  Evaluate teaching effectiveness    ___m_ Hemodynamic/Respiratory Status:       (Pre and Post Procedure Monitoring)  Assess/Monitor vital signs and LOC  Assess Baseline SpO2 prior to any sedation  Obtain weight/height  Assess vital signs/ LOC until patient meets discharge criteria  Monitor procedure site and notify MD of any issues

## 2023-06-09 RX ORDER — VALSARTAN AND HYDROCHLOROTHIAZIDE 320; 25 MG/1; MG/1
TABLET, FILM COATED ORAL
Qty: 90 TABLET | Refills: 3 | Status: SHIPPED | OUTPATIENT
Start: 2023-06-09

## 2023-06-09 NOTE — TELEPHONE ENCOUNTER
Recent Visits  Date Type Provider Dept   05/16/23 Office Visit Ogkalee Fernandezsherrie, DO [facility]   07/28/22 Office Visit Og Calebjonasherrie, DO [facility]   03/03/22 Office Visit Og Calebjonasherrie, DO [facility]   01/27/22 Office Visit Og Bougie, DO [facility]   Showing recent visits within past 540 days with a meds authorizing provider and meeting all other requirements  Future Appointments  No visits were found meeting these conditions.   Showing future appointments within next 150 days with a meds authorizing provider and meeting all other requirements     Future Appointments   Date Time Provider Ramses Melissa   10/26/2023 11:20 AM Ирина Pace MD N Oncology [facility]   11/16/2023  2:00 PM [address]   12/1/2023 12:30 PM STR EXAM ROOM 4 STR OP NURS [place] [facility]

## 2023-10-19 ENCOUNTER — TELEPHONE (OUTPATIENT)
Dept: FAMILY MEDICINE CLINIC | Age: 73
End: 2023-10-19

## 2023-10-19 DIAGNOSIS — R73.9 HYPERGLYCEMIA: Primary | ICD-10-CM

## 2023-10-19 NOTE — TELEPHONE ENCOUNTER
Pt due for fasting labs prior to next apt on 11/16/2023. Please call to have pt complete this. Thanks! ASSESSMENT & PLAN   Diagnosis Orders   1.  Hyperglycemia  Glucose, Random    Hemoglobin A1C        Future Appointments   Date Time Provider [address]   11/1/2023 11:00 AM Cullen Figueroa MD N Oncology [facility]   11/16/2023  2:00 PM [facility] [address]   12/1/2023 12:30 PM STR EXAM ROOM 4 [facility] OP NURS [place] [facility]

## 2023-10-19 NOTE — TELEPHONE ENCOUNTER
Attempted to call patient, no voicemail set up, I was unable to leave message for patient.    Labs sent in mail

## 2023-11-01 ENCOUNTER — OFFICE VISIT (OUTPATIENT)
Dept: ONCOLOGY | Age: 73
End: 2023-11-01
Payer: MEDICARE

## 2023-11-01 ENCOUNTER — HOSPITAL ENCOUNTER (OUTPATIENT)
Dept: INFUSION THERAPY | Age: 73
Discharge: HOME | End: 2023-11-01
Payer: MEDICARE

## 2023-11-01 VITALS
HEART RATE: 58 BPM | SYSTOLIC BLOOD PRESSURE: 122 MMHG | TEMPERATURE: 97.8 F | RESPIRATION RATE: 16 BRPM | DIASTOLIC BLOOD PRESSURE: 90 MMHG | HEIGHT: 63 IN | OXYGEN SATURATION: 97 % | WEIGHT: 190 LBS | BODY MASS INDEX: 33.66 KG/M2

## 2023-11-01 VITALS
DIASTOLIC BLOOD PRESSURE: 90 MMHG | RESPIRATION RATE: 16 BRPM | TEMPERATURE: 97.8 F | SYSTOLIC BLOOD PRESSURE: 122 MMHG | HEART RATE: 58 BPM | OXYGEN SATURATION: 97 %

## 2023-11-01 DIAGNOSIS — C50.919 MALIGNANT NEOPLASM OF FEMALE BREAST, UNSPECIFIED ESTROGEN RECEPTOR STATUS, UNSPECIFIED LATERALITY, UNSPECIFIED SITE OF BREAST (HCC): Primary | ICD-10-CM

## 2023-11-01 DIAGNOSIS — Z12.31 ENCOUNTER FOR SCREENING MAMMOGRAM FOR MALIGNANT NEOPLASM OF BREAST: ICD-10-CM

## 2023-11-01 DIAGNOSIS — Z51.81 ENCOUNTER FOR MONITORING AROMATASE INHIBITOR THERAPY: ICD-10-CM

## 2023-11-01 DIAGNOSIS — Z79.811 ENCOUNTER FOR MONITORING AROMATASE INHIBITOR THERAPY: ICD-10-CM

## 2023-11-01 PROCEDURE — 3080F DIAST BP >= 90 MM HG: CPT | Performed by: INTERNAL MEDICINE

## 2023-11-01 PROCEDURE — 1123F ACP DISCUSS/DSCN MKR DOCD: CPT | Performed by: INTERNAL MEDICINE

## 2023-11-01 PROCEDURE — 99212 OFFICE O/P EST SF 10 MIN: CPT | Performed by: INTERNAL MEDICINE

## 2023-11-01 PROCEDURE — 3074F SYST BP LT 130 MM HG: CPT | Performed by: INTERNAL MEDICINE

## 2023-11-01 PROCEDURE — 99211 OFF/OP EST MAY X REQ PHY/QHP: CPT

## 2023-11-01 NOTE — PROGRESS NOTES
Value Date/Time    TSH 3.27 06/13/2023 08:45 AM     VITAMIN B12: No components found for: \"B12\"  FOLATE:    Lab Results   Component Value Date/Time    FOLATE > 20.0 11/30/2018 10:15 AM     IRON:    Lab Results   Component Value Date/Time    IRON 121 04/16/2020 10:19 AM     Iron Saturation:  No components found for: \"PERCENTFE\"  TIBC:    Lab Results   Component Value Date/Time    TIBC 391 05/05/2020 08:51 AM     FERRITIN:    Lab Results   Component Value Date/Time    FERRITIN 224 05/05/2020 08:51 AM             IMAGING:  PROCEDURE: [facility] MAXIMINO DIGITAL SCREEN SELF REFERRAL W OR WO CAD BILATERAL 4/26/23     CLINICAL INFORMATION: Visit for screening mammogram. Tomosynthesis. CLINICAL:     Self-referred screening mammogram  PATIENT MEDICAL HISTORY:  Medical history includes breast cancer. FAMILY HISTORY:   No relevant family history has been documented for this patient. RISK VALUES: Tyrer-Cuzick 10yr.: NA%, Tyrer-Cuzick life:   NA%     COMPARISON: 4/25/2022, 3/30/2021, 3/17/2020. TECHNIQUE: Bilateral CC and MLO views of the breasts were obtained. 3D tomosynthesis was utilized. CAD was utilized. BREAST COMPOSITION: The tissue of the breast(s) is predominantly fatty. FINDINGS: There are bilateral benign-appearing calcifications. There is a biopsy clip in each breast. There are benign-appearing left axillary lymph nodes. No significant masses, calcifications, or other findings are seen in the breast(s). There has been no significant interval change. IMPRESSION:  No mammographic evidence of malignancy. A 1 year screening mammogram is recommended. [facility] MAXIMINO DIGITAL SCREEN BILATERAL  Result Date: 4/25/2022  No mammographic evidence of malignancy. A 1 year screening mammogram is recommended. A result letter will be sent to the patient. She will also receive a reminder 1 month prior to her next mammogram. . BI-RADS CATEGORY 2: BENIGN FINDINGS. Management Recommendation: Routine annual mammography.

## 2023-11-07 DIAGNOSIS — F33.0 MAJOR DEPRESSIVE DISORDER, RECURRENT EPISODE, MILD (HCC): ICD-10-CM

## 2023-11-07 DIAGNOSIS — F41.9 ANXIETY: ICD-10-CM

## 2023-11-07 RX ORDER — CITALOPRAM 20 MG/1
TABLET ORAL
Qty: 90 TABLET | Refills: 3 | Status: SHIPPED | OUTPATIENT
Start: 2023-11-07

## 2023-11-15 LAB
AVERAGE GLUCOSE: 126 MG/DL
GLUCOSE: 139 MG/DL (ref 70–99)
HBA1C MFR BLD: 6 % (ref 4.2–5.6)

## 2023-11-15 NOTE — PROGRESS NOTES
Chief Complaint   Patient presents with    Medicare AWV    Follow-up     Chronic issues noted below     History obtained from the patient. SUBJECTIVE:  Aime Hernández is a 74 y.o. female that presents today for     -HTN:    HPI:     Taking meds as prescribed ?: yes  Tolerating well ?: yes  Side Effects ?: denies  BP at home ?: <140/90  Working on TLCS ?: yes  Chest Pain/SOB/Palpitations? denies    BP Readings from Last 3 Encounters:   11/16/23 128/76   11/01/23 (!) 122/90   11/01/23 (!) 122/90       -PreDM PRIOR VISIT: wts going up d/t estrogen blocker. Due for labs. Trying to watch diet. UPDATE PRIOR VISIT: wts down a few labs. Had labs done, those are pending. UPDATE PRIOR VISIT: wts up a bit. Higher d/t estrogen blockers. Trying to make diet changes. a1c stable. UPDATE PRIOR VISIT: wts stable. Labs a little worse. On meformin. Working on life style changes. UPDATE PRIOR VISIT: wts improved. Labs stable. On metformin. Working on life style changes. UPDATE PRIOr VISIT:   Labs stable  wts about the same  On metfromin  Working on life style changes     UPDATE PRIOR VISIT:   Due for labs  On metformin  Working on lifestyle changes     UPDATE TODAY:   Labs stable  On metformin yet   Doing lifestyle changes yet    Lab Results   Component Value Date/Time    LABA1C 6.0 11/15/2023 08:16 AM    LABA1C 6.1 06/13/2023 08:45 AM    LABA1C 6.1 07/20/2022 08:47 AM    LABA1C 6.1 01/19/2022 09:52 AM    LABA1C 6.1 07/23/2021 08:26 AM    LABA1C 5.7 01/06/2021 09:22 AM     Wt Readings from Last 3 Encounters:   11/16/23 85.5 kg (188 lb 6.4 oz)   11/01/23 86.2 kg (190 lb)   05/16/23 88.2 kg (194 lb 6.4 oz)       -HLD:    HPI:    Taking meds as prescribed ?: yes  Tolerating well ?: yes  Side Effects ?: denies  Muscle Pain?: denies  Working on TLCS ?: yes      -Depression/Anxiety: depression and anxiety doing well on buspar/celexa.  No SI/HI      -Osteoporosis:  Was on alendronate, on for ~ 7 years  Last dexa

## 2023-11-16 ENCOUNTER — OFFICE VISIT (OUTPATIENT)
Dept: FAMILY MEDICINE CLINIC | Age: 73
End: 2023-11-16

## 2023-11-16 VITALS
OXYGEN SATURATION: 98 % | TEMPERATURE: 97.7 F | SYSTOLIC BLOOD PRESSURE: 128 MMHG | RESPIRATION RATE: 18 BRPM | HEART RATE: 50 BPM | DIASTOLIC BLOOD PRESSURE: 76 MMHG | HEIGHT: 63 IN | BODY MASS INDEX: 33.38 KG/M2 | WEIGHT: 188.4 LBS

## 2023-11-16 DIAGNOSIS — R73.9 HYPERGLYCEMIA: ICD-10-CM

## 2023-11-16 DIAGNOSIS — E66.9 OBESITY (BMI 30-39.9): ICD-10-CM

## 2023-11-16 DIAGNOSIS — F41.1 GAD (GENERALIZED ANXIETY DISORDER): ICD-10-CM

## 2023-11-16 DIAGNOSIS — D50.0 IRON DEFICIENCY ANEMIA DUE TO CHRONIC BLOOD LOSS: ICD-10-CM

## 2023-11-16 DIAGNOSIS — Z23 NEED FOR INFLUENZA VACCINATION: ICD-10-CM

## 2023-11-16 DIAGNOSIS — F33.42 RECURRENT MAJOR DEPRESSIVE DISORDER, IN FULL REMISSION (HCC): ICD-10-CM

## 2023-11-16 DIAGNOSIS — Z00.00 MEDICARE ANNUAL WELLNESS VISIT, SUBSEQUENT: Primary | ICD-10-CM

## 2023-11-16 DIAGNOSIS — E78.00 PURE HYPERCHOLESTEROLEMIA: ICD-10-CM

## 2023-11-16 DIAGNOSIS — C50.919 MALIGNANT NEOPLASM OF FEMALE BREAST, UNSPECIFIED ESTROGEN RECEPTOR STATUS, UNSPECIFIED LATERALITY, UNSPECIFIED SITE OF BREAST (HCC): ICD-10-CM

## 2023-11-16 DIAGNOSIS — M81.0 OSTEOPOROSIS, POST-MENOPAUSAL: ICD-10-CM

## 2023-11-16 DIAGNOSIS — Z85.3 HISTORY OF BREAST CANCER: ICD-10-CM

## 2023-11-16 DIAGNOSIS — R73.03 PREDIABETES: ICD-10-CM

## 2023-11-16 DIAGNOSIS — I10 ESSENTIAL (PRIMARY) HYPERTENSION: ICD-10-CM

## 2023-11-16 ASSESSMENT — LIFESTYLE VARIABLES
HOW MANY STANDARD DRINKS CONTAINING ALCOHOL DO YOU HAVE ON A TYPICAL DAY: PATIENT DOES NOT DRINK
HOW OFTEN DO YOU HAVE A DRINK CONTAINING ALCOHOL: NEVER

## 2023-11-16 ASSESSMENT — COLUMBIA-SUICIDE SEVERITY RATING SCALE - C-SSRS
3. HAVE YOU BEEN THINKING ABOUT HOW YOU MIGHT KILL YOURSELF?: NO
4. HAVE YOU HAD THESE THOUGHTS AND HAD SOME INTENTION OF ACTING ON THEM?: NO
7. DID THIS OCCUR IN THE LAST THREE MONTHS: NO
5. HAVE YOU STARTED TO WORK OUT OR WORKED OUT THE DETAILS OF HOW TO KILL YOURSELF? DO YOU INTEND TO CARRY OUT THIS PLAN?: NO

## 2023-11-16 ASSESSMENT — PATIENT HEALTH QUESTIONNAIRE - PHQ9
SUM OF ALL RESPONSES TO PHQ QUESTIONS 1-9: 3
7. TROUBLE CONCENTRATING ON THINGS, SUCH AS READING THE NEWSPAPER OR WATCHING TELEVISION: 0
10. IF YOU CHECKED OFF ANY PROBLEMS, HOW DIFFICULT HAVE THESE PROBLEMS MADE IT FOR YOU TO DO YOUR WORK, TAKE CARE OF THINGS AT HOME, OR GET ALONG WITH OTHER PEOPLE: 0
2. FEELING DOWN, DEPRESSED OR HOPELESS: 1
9. THOUGHTS THAT YOU WOULD BE BETTER OFF DEAD, OR OF HURTING YOURSELF: 0
SUM OF ALL RESPONSES TO PHQ QUESTIONS 1-9: 3
SUM OF ALL RESPONSES TO PHQ QUESTIONS 1-9: 3
8. MOVING OR SPEAKING SO SLOWLY THAT OTHER PEOPLE COULD HAVE NOTICED. OR THE OPPOSITE, BEING SO FIGETY OR RESTLESS THAT YOU HAVE BEEN MOVING AROUND A LOT MORE THAN USUAL: 0
SUM OF ALL RESPONSES TO PHQ9 QUESTIONS 1 & 2: 1
4. FEELING TIRED OR HAVING LITTLE ENERGY: 1
6. FEELING BAD ABOUT YOURSELF - OR THAT YOU ARE A FAILURE OR HAVE LET YOURSELF OR YOUR FAMILY DOWN: 0
SUM OF ALL RESPONSES TO PHQ QUESTIONS 1-9: 3
5. POOR APPETITE OR OVEREATING: 1
3. TROUBLE FALLING OR STAYING ASLEEP: 0
1. LITTLE INTEREST OR PLEASURE IN DOING THINGS: 0

## 2023-11-16 NOTE — PROGRESS NOTES
Vaccine Information Sheet, \"Influenza - Inactivated\"  given to Martha Arroyo, or parent/legal guardian of  Martha Arroyo and verbalized understanding. Patient responses:    Have you ever had a reaction to a flu vaccine? No  Do you have an allergy to eggs, neomycin or polymixin? No  Do you have an allergy to Thimerosal, contact lens solution, or Merthiolate? No  Have you ever had [place] Syndrome? No  Do you have any current illness? No  Do you have a temperature above 100 degrees? No  Are you pregnant? No  If pregnant, permission obtained from physician? No  Do you have an active neurological disorder? No      Flu vaccine given per order. Please see immunization tab.

## 2023-12-01 ENCOUNTER — HOSPITAL ENCOUNTER (OUTPATIENT)
Dept: NURSING | Age: 73
Discharge: HOME | End: 2023-12-01
Payer: MEDICARE

## 2023-12-01 VITALS
RESPIRATION RATE: 18 BRPM | WEIGHT: 193 LBS | OXYGEN SATURATION: 96 % | HEART RATE: 69 BPM | BODY MASS INDEX: 34.2 KG/M2 | DIASTOLIC BLOOD PRESSURE: 74 MMHG | SYSTOLIC BLOOD PRESSURE: 157 MMHG | TEMPERATURE: 98 F

## 2023-12-01 DIAGNOSIS — M81.0 OSTEOPOROSIS, POST-MENOPAUSAL: Primary | ICD-10-CM

## 2023-12-01 PROCEDURE — 96372 THER/PROPH/DIAG INJ SC/IM: CPT

## 2023-12-01 PROCEDURE — 6360000002 HC RX W HCPCS: Performed by: FAMILY MEDICINE

## 2023-12-01 RX ORDER — DIPHENHYDRAMINE HYDROCHLORIDE 50 MG/ML
50 INJECTION INTRAMUSCULAR; INTRAVENOUS
OUTPATIENT
Start: 2024-05-31

## 2023-12-01 RX ORDER — EPINEPHRINE 1 MG/ML
0.3 INJECTION, SOLUTION INTRAMUSCULAR; SUBCUTANEOUS PRN
OUTPATIENT
Start: 2024-05-31

## 2023-12-01 RX ORDER — SODIUM CHLORIDE 9 MG/ML
INJECTION, SOLUTION INTRAVENOUS CONTINUOUS
OUTPATIENT
Start: 2024-05-31

## 2023-12-01 RX ADMIN — DENOSUMAB 60 MG: 60 INJECTION SUBCUTANEOUS at 12:27

## 2023-12-01 ASSESSMENT — PAIN - FUNCTIONAL ASSESSMENT: PAIN_FUNCTIONAL_ASSESSMENT: NONE - DENIES PAIN

## 2023-12-01 NOTE — DISCHARGE INSTRUCTIONS
PROLIA DISCHARGE INSTRUCTIONS    DIET:  Drink extra fluids. ACTIVITY : Usual     1. Continue to take adequate calcium and vitamin D. Recommend calcium 1200 mg per day and vitamin D 400 - 800 units daily. 2.  If flu like symptoms - fever, muscle soreness or headache take Tylenol or Ibuprofen. Symptoms may last up to 3 days, sometimes 7 - 14 days. 3.  Follow up with ordering physician. 4.  Any problems return to emergency room. 5.  Continue usual medication. MEDICATION GIVEN TWICE A YEAR. YOU WILL NEED A NEW ORDER FOR YOUR NEXT DOSE. CONSULT YOUR DOCTOR FOR NEXT DOSE.   YOUR NEXT PROLIA INJECTION SHOULD BE ON OR AFTER 6/03/2024  PLEASE CALL IF YOU HAVE ANY QUESTIONS [phone]

## 2023-12-01 NOTE — PROGRESS NOTES
___M_ Safety:       (Environmental)  [place] to environment  Ensure ID band is correct and in place/ allergy band as needed  Assess for fall risk  Initiate fall precautions as applicable (fall band, side rails, etc.)  Call light within reach  Bed in low position/ wheels locked    __M__ Pain:       Assess pain level and characteristics  Administer analgesics as ordered  Assess effectiveness of pain management and report to MD as needed    __M__ Knowledge Deficit:  Assess baseline knowledge  Provide teaching at level of understanding  Provide teaching via preferred learning method  Evaluate teaching effectiveness    __M__ Hemodynamic/Respiratory Status:       (Pre and Post Procedure Monitoring)  Assess/Monitor vital signs and LOC  Assess Baseline SpO2 prior to any sedation  Obtain weight/height  Assess vital signs/ LOC until patient meets discharge criteria  Monitor procedure site and notify MD of any issues

## 2023-12-11 RX ORDER — VALSARTAN AND HYDROCHLOROTHIAZIDE 320; 25 MG/1; MG/1
TABLET, FILM COATED ORAL
Qty: 90 TABLET | Refills: 3 | Status: SHIPPED | OUTPATIENT
Start: 2023-12-11

## 2023-12-11 NOTE — TELEPHONE ENCOUNTER
Recent Visits  Date Type Provider Dept   11/16/23 Office Visit Aurora Franklin, DO [facility]   05/16/23 Office Visit Aurora Franklin, DO [facility]   07/28/22 Office Visit Aurora Franklin, DO [facility]   Showing recent visits within past 540 days with a meds authorizing provider and meeting all other requirements  Future Appointments  No visits were found meeting these conditions.   Showing future appointments within next 150 days with a meds authorizing provider and meeting all other requirements

## 2024-02-21 ENCOUNTER — TELEPHONE (OUTPATIENT)
Dept: FAMILY MEDICINE CLINIC | Age: 74
End: 2024-02-21

## 2024-02-21 DIAGNOSIS — R73.03 PREDIABETES: Chronic | ICD-10-CM

## 2024-02-21 DIAGNOSIS — E66.9 OBESITY (BMI 30-39.9): ICD-10-CM

## 2024-04-16 DIAGNOSIS — E78.00 PURE HYPERCHOLESTEROLEMIA: Chronic | ICD-10-CM

## 2024-04-16 RX ORDER — ATORVASTATIN CALCIUM 20 MG/1
TABLET, FILM COATED ORAL
Qty: 90 TABLET | Refills: 3 | Status: SHIPPED | OUTPATIENT
Start: 2024-04-16

## 2024-04-16 NOTE — TELEPHONE ENCOUNTER
Recent Visits  Date Type Provider Dept   11/16/23 Office Visit Suraj Machado, DO [facility]   05/16/23 Office Visit Suraj Machado, DO [facility]   Showing recent visits within past 540 days with a meds authorizing provider and meeting all other requirements  Future Appointments  Date Type Provider Dept   05/13/24 Appointment Suraj Machado, DO [facility]   Showing future appointments within next 150 days with a meds authorizing provider and meeting all other requirements

## 2024-04-27 DIAGNOSIS — F41.1 GAD (GENERALIZED ANXIETY DISORDER): Chronic | ICD-10-CM

## 2024-04-29 ENCOUNTER — HOSPITAL ENCOUNTER (OUTPATIENT)
Dept: WOMENS IMAGING | Age: 74
Discharge: HOME | End: 2024-04-29
Attending: INTERNAL MEDICINE
Payer: MEDICARE

## 2024-04-29 VITALS — WEIGHT: 193 LBS | HEIGHT: 62 IN | BODY MASS INDEX: 35.51 KG/M2

## 2024-04-29 DIAGNOSIS — Z12.31 ENCOUNTER FOR SCREENING MAMMOGRAM FOR MALIGNANT NEOPLASM OF BREAST: ICD-10-CM

## 2024-04-29 DIAGNOSIS — Z51.81 ENCOUNTER FOR MONITORING AROMATASE INHIBITOR THERAPY: ICD-10-CM

## 2024-04-29 DIAGNOSIS — Z79.811 ENCOUNTER FOR MONITORING AROMATASE INHIBITOR THERAPY: ICD-10-CM

## 2024-04-29 DIAGNOSIS — C50.919 MALIGNANT NEOPLASM OF FEMALE BREAST, UNSPECIFIED ESTROGEN RECEPTOR STATUS, UNSPECIFIED LATERALITY, UNSPECIFIED SITE OF BREAST (HCC): ICD-10-CM

## 2024-04-29 PROCEDURE — 77063 BREAST TOMOSYNTHESIS BI: CPT

## 2024-04-29 RX ORDER — BUSPIRONE HYDROCHLORIDE 10 MG/1
TABLET ORAL
Qty: 180 TABLET | Refills: 3 | Status: SHIPPED | OUTPATIENT
Start: 2024-04-29

## 2024-05-01 ENCOUNTER — HOSPITAL ENCOUNTER (OUTPATIENT)
Dept: INFUSION THERAPY | Age: 74
Discharge: HOME | End: 2024-05-01
Payer: MEDICARE

## 2024-05-01 ENCOUNTER — OFFICE VISIT (OUTPATIENT)
Dept: ONCOLOGY | Age: 74
End: 2024-05-01
Payer: MEDICARE

## 2024-05-01 VITALS
HEART RATE: 60 BPM | RESPIRATION RATE: 16 BRPM | TEMPERATURE: 97.8 F | DIASTOLIC BLOOD PRESSURE: 76 MMHG | SYSTOLIC BLOOD PRESSURE: 121 MMHG | OXYGEN SATURATION: 96 %

## 2024-05-01 VITALS
DIASTOLIC BLOOD PRESSURE: 76 MMHG | HEIGHT: 62 IN | RESPIRATION RATE: 16 BRPM | WEIGHT: 195 LBS | BODY MASS INDEX: 35.88 KG/M2 | OXYGEN SATURATION: 96 % | SYSTOLIC BLOOD PRESSURE: 121 MMHG | HEART RATE: 60 BPM | TEMPERATURE: 97.8 F

## 2024-05-01 DIAGNOSIS — C50.919 MALIGNANT NEOPLASM OF FEMALE BREAST, UNSPECIFIED ESTROGEN RECEPTOR STATUS, UNSPECIFIED LATERALITY, UNSPECIFIED SITE OF BREAST (HCC): Primary | ICD-10-CM

## 2024-05-01 DIAGNOSIS — Z12.31 ENCOUNTER FOR SCREENING MAMMOGRAM FOR MALIGNANT NEOPLASM OF BREAST: ICD-10-CM

## 2024-05-01 DIAGNOSIS — E66.01 SEVERE OBESITY (BMI 35.0-39.9) WITH COMORBIDITY (HCC): ICD-10-CM

## 2024-05-01 PROCEDURE — 99214 OFFICE O/P EST MOD 30 MIN: CPT | Performed by: INTERNAL MEDICINE

## 2024-05-01 PROCEDURE — 3074F SYST BP LT 130 MM HG: CPT | Performed by: INTERNAL MEDICINE

## 2024-05-01 PROCEDURE — 99211 OFF/OP EST MAY X REQ PHY/QHP: CPT

## 2024-05-01 PROCEDURE — 1123F ACP DISCUSS/DSCN MKR DOCD: CPT | Performed by: INTERNAL MEDICINE

## 2024-05-01 PROCEDURE — 3078F DIAST BP <80 MM HG: CPT | Performed by: INTERNAL MEDICINE

## 2024-05-01 NOTE — PATIENT INSTRUCTIONS
Orders Placed This Encounter   Procedures    PATEL MAXIMINO DIGITAL SCREEN BILATERAL    CBC with Auto Differential    Hepatic Function Panel    POC PANEL BMP W/IOCA   Follow up visit MD visit+ labs+ review the MMG results.

## 2024-05-02 NOTE — PROGRESS NOTES
therapeutic radiation 2014    Major depression     Morbid obesity (HCC)     OA (osteoarthritis) 10/11/2014    Obesity (BMI 30-39.9)     Osteoarthritis of thoracolumbar spine     Osteoporosis, post-menopausal     Prediabetes     Pure hypercholesterolemia 10/09/2016        Social HX:   Social History     Socioeconomic History    Marital status:      Spouse name: Not on file    Number of children: Not on file    Years of education: Not on file    Highest education level: Not on file   Occupational History    Not on file   Tobacco Use    Smoking status: Never    Smokeless tobacco: Never   Substance and Sexual Activity    Alcohol use: Yes     Comment: little wine    Drug use: No    Sexual activity: Not Currently     Partners: Male   Other Topics Concern    Not on file   Social History Narrative    Not on file     Social Determinants of Health     Financial Resource Strain: Low Risk  (2023)    Overall Financial Resource Strain (CARDIA)     Difficulty of Paying Living Expenses: Not hard at all   Food Insecurity: Not on file (2023)   Transportation Needs: Unknown (2023)    PRAPARE - Transportation     Lack of Transportation (Medical): Not on file     Lack of Transportation (Non-Medical): No   Physical Activity: Inactive (2023)    Exercise Vital Sign     Days of Exercise per Week: 0 days     Minutes of Exercise per Session: 0 min   Stress: Not on file   Social Connections: Not on file   Intimate Partner Violence: Not on file   Housing Stability: Unknown (2023)    Housing Stability Vital Sign     Unable to Pay for Housing in the Last Year: Not on file     Number of Places Lived in the Last Year: Not on file     Unstable Housing in the Last Year: No        Spouse:     Phone: [phone] [address] [place] [address]     Employment:  Retired     Immunizations:  Immunization History   Administered Date(s) Administered    COVID-19, MODERNA [facility], [facility]

## 2024-05-10 NOTE — PATIENT INSTRUCTIONS
LAB INSTRUCTIONS:    Please complete labs within 2 week(s).    Please fast for 8 hours prior to lab collection.    The clinic will call you within 1 week of collection. If you have not heard from us within that amount of time, please call us at [phone].

## 2024-05-10 NOTE — PROGRESS NOTES
change, Rash, Itching, Wound  Musculoskeletal:  Joint pain, Back pain, Gait problems, Joint swelling, Myalgias  Neurological:  Dizziness, Headaches, Presyncope, Numbness, Seizures, Tremors  Endocrine:  Heat Intolerance, Cold Intolerance, Polydipsia, Polyphagia, Polyuria      PHYSICAL EXAM:  Vitals:    05/13/24 0904   BP: 120/78   Pulse: 83   Resp: 14   Temp: 98.2 °F (36.8 °C)   TempSrc: Oral   SpO2: 95%   Weight: 86.7 kg (191 lb 3.2 oz)   Height: 1.575 m (5' 2\")     Body mass index is 34.97 kg/m².       VS Reviewed  General Appearance: A&O x 3, No acute distress,well developed and well- nourished  Eyes: pupils equal, round, and reactive to light, extraocular eye movements intact, conjunctivae and eye lids without erythema  ENT: external ear and ear canal clear bilaterally, TMs intact and regular, nose without deformity, nasal mucosa and turbinates normal without polyps, oropharynx normal, dentition is normal for age  Neck: supple and non-tender without mass, no thyromegaly or thyroid nodules, no cervical lymphadenopathy  Pulmonary/Chest: clear to auscultation bilaterally- no wheezes, rales or rhonchi, normal air movement, no respiratory distress or retractions  Cardiovascular: S1 and S2 auscultated w/ RRR. No murmurs, rubs, clicks, or gallops, distal pulses intact.  Abdomen: soft, non-tender, non-distended, bowl sounds physiologic,  no rebound or guarding, no masses or hernias noted. Liver and spleen without enlargement.   Extremities: no cyanosis, clubbing or edema of the lower extremities.   Skin: warm and dry, no rash or erythema  Psych: Affect appropriate. Mood euthymic. Thought process is normal without evidence of depression or psychosis. Good insight and appropriate interaction.  Cognition and memory appear to be intact.      ASSESSMENT & PLAN  1. Essential (primary) hypertension    Stable  At goal  con't valsartan/hctz    - Lipid Panel; Future  - Hemoglobin A1C; Future  - TSH with Reflex; Future  -

## 2024-05-13 ENCOUNTER — OFFICE VISIT (OUTPATIENT)
Dept: FAMILY MEDICINE CLINIC | Age: 74
End: 2024-05-13
Payer: MEDICARE

## 2024-05-13 VITALS
RESPIRATION RATE: 14 BRPM | HEART RATE: 83 BPM | OXYGEN SATURATION: 95 % | WEIGHT: 191.2 LBS | DIASTOLIC BLOOD PRESSURE: 78 MMHG | HEIGHT: 62 IN | SYSTOLIC BLOOD PRESSURE: 120 MMHG | TEMPERATURE: 98.2 F | BODY MASS INDEX: 35.19 KG/M2

## 2024-05-13 DIAGNOSIS — E78.00 PURE HYPERCHOLESTEROLEMIA: Chronic | ICD-10-CM

## 2024-05-13 DIAGNOSIS — I10 ESSENTIAL (PRIMARY) HYPERTENSION: Primary | Chronic | ICD-10-CM

## 2024-05-13 DIAGNOSIS — D50.0 IRON DEFICIENCY ANEMIA DUE TO CHRONIC BLOOD LOSS: ICD-10-CM

## 2024-05-13 DIAGNOSIS — R73.9 HYPERGLYCEMIA: ICD-10-CM

## 2024-05-13 DIAGNOSIS — M81.0 OSTEOPOROSIS, POST-MENOPAUSAL: Chronic | ICD-10-CM

## 2024-05-13 DIAGNOSIS — C50.919 MALIGNANT NEOPLASM OF FEMALE BREAST, UNSPECIFIED ESTROGEN RECEPTOR STATUS, UNSPECIFIED LATERALITY, UNSPECIFIED SITE OF BREAST (HCC): ICD-10-CM

## 2024-05-13 DIAGNOSIS — F33.42 RECURRENT MAJOR DEPRESSIVE DISORDER, IN FULL REMISSION (HCC): ICD-10-CM

## 2024-05-13 DIAGNOSIS — Z85.3 HISTORY OF BREAST CANCER: ICD-10-CM

## 2024-05-13 DIAGNOSIS — F41.1 GAD (GENERALIZED ANXIETY DISORDER): ICD-10-CM

## 2024-05-13 DIAGNOSIS — E66.9 OBESITY (BMI 30-39.9): Chronic | ICD-10-CM

## 2024-05-13 DIAGNOSIS — R73.03 PREDIABETES: Chronic | ICD-10-CM

## 2024-05-13 PROCEDURE — 3074F SYST BP LT 130 MM HG: CPT | Performed by: FAMILY MEDICINE

## 2024-05-13 PROCEDURE — 3078F DIAST BP <80 MM HG: CPT | Performed by: FAMILY MEDICINE

## 2024-05-13 PROCEDURE — 1123F ACP DISCUSS/DSCN MKR DOCD: CPT | Performed by: FAMILY MEDICINE

## 2024-05-13 PROCEDURE — 99214 OFFICE O/P EST MOD 30 MIN: CPT | Performed by: FAMILY MEDICINE

## 2024-05-13 ASSESSMENT — PATIENT HEALTH QUESTIONNAIRE - PHQ9
10. IF YOU CHECKED OFF ANY PROBLEMS, HOW DIFFICULT HAVE THESE PROBLEMS MADE IT FOR YOU TO DO YOUR WORK, TAKE CARE OF THINGS AT HOME, OR GET ALONG WITH OTHER PEOPLE: NOT DIFFICULT AT ALL
9. THOUGHTS THAT YOU WOULD BE BETTER OFF DEAD, OR OF HURTING YOURSELF: NOT AT ALL
SUM OF ALL RESPONSES TO PHQ QUESTIONS 1-9: 0
6. FEELING BAD ABOUT YOURSELF - OR THAT YOU ARE A FAILURE OR HAVE LET YOURSELF OR YOUR FAMILY DOWN: NOT AT ALL
SUM OF ALL RESPONSES TO PHQ QUESTIONS 1-9: 0
8. MOVING OR SPEAKING SO SLOWLY THAT OTHER PEOPLE COULD HAVE NOTICED. OR THE OPPOSITE, BEING SO FIGETY OR RESTLESS THAT YOU HAVE BEEN MOVING AROUND A LOT MORE THAN USUAL: NOT AT ALL
2. FEELING DOWN, DEPRESSED OR HOPELESS: NOT AT ALL
SUM OF ALL RESPONSES TO PHQ QUESTIONS 1-9: 0
5. POOR APPETITE OR OVEREATING: NOT AT ALL
SUM OF ALL RESPONSES TO PHQ9 QUESTIONS 1 & 2: 0
SUM OF ALL RESPONSES TO PHQ QUESTIONS 1-9: 0
4. FEELING TIRED OR HAVING LITTLE ENERGY: NOT AT ALL
3. TROUBLE FALLING OR STAYING ASLEEP: NOT AT ALL
7. TROUBLE CONCENTRATING ON THINGS, SUCH AS READING THE NEWSPAPER OR WATCHING TELEVISION: NOT AT ALL
1. LITTLE INTEREST OR PLEASURE IN DOING THINGS: NOT AT ALL

## 2024-05-14 RX ORDER — SODIUM CHLORIDE 9 MG/ML
INJECTION, SOLUTION INTRAVENOUS CONTINUOUS
OUTPATIENT
Start: 2024-06-03

## 2024-05-14 RX ORDER — EPINEPHRINE 1 MG/ML
0.3 INJECTION, SOLUTION, CONCENTRATE INTRAVENOUS PRN
OUTPATIENT
Start: 2024-06-03

## 2024-05-14 RX ORDER — DIPHENHYDRAMINE HYDROCHLORIDE 50 MG/ML
50 INJECTION INTRAMUSCULAR; INTRAVENOUS
OUTPATIENT
Start: 2024-06-03

## 2024-05-21 LAB
CHOLESTEROL, TOTAL: 178 MG/DL
CHOLESTEROL/HDL RATIO: 3 RATIO
CREATINE, URINE: 304.3 MG/DL
ESTIMATED AVERAGE GLUCOSE: 126 MG/DL
HBA1C MFR BLD: 6 % (ref 4.2–5.6)
HDLC SERPL-MCNC: 59 MG/DL
LDL CHOLESTEROL: 82 MG/DL
LDL/HDL RATIO: 1.4 RATIO
MICROALBUMIN/CREAT 24H UR: 9.4 MG/DL
MICROALBUMIN/CREAT UR-RTO: 31 MG/G
TRIGL SERPL-MCNC: 184 MG/DL
TSH SERPL DL<=0.05 MIU/L-ACNC: 2.63 UIU/ML (ref 0.4–4.1)
VLDLC SERPL CALC-MCNC: 37 MG/DL

## 2024-05-22 ENCOUNTER — TELEPHONE (OUTPATIENT)
Dept: FAMILY MEDICINE CLINIC | Age: 74
End: 2024-05-22

## 2024-05-22 NOTE — TELEPHONE ENCOUNTER
Patient has been informed and voiced understanding of results below    Got patient scheduled for AWV visit    Future Appointments   Date Time Provider Department Center   11/13/2024  2:40 PM Suraj Machado, DO [facility] [place]   5/1/2025 11:00 AM STR MAMMOGRAPHY RM2  [facility] WOMEN STR Rad/Card

## 2024-05-22 NOTE — TELEPHONE ENCOUNTER
----- Message from Suraj Machado, DO sent at 5/21/2024  8:41 PM EDT -----  Please let pt know that labs are stable and appropriate.   Pt needs apt for AWV ~11/13/24, not scheduled at time of last visit  Please do that now if able  Let me know if questions, thanks!

## 2024-05-31 ENCOUNTER — TELEPHONE (OUTPATIENT)
Dept: FAMILY MEDICINE CLINIC | Age: 74
End: 2024-05-31

## 2024-05-31 DIAGNOSIS — M81.0 OSTEOPOROSIS, POST-MENOPAUSAL: Primary | ICD-10-CM

## 2024-05-31 NOTE — TELEPHONE ENCOUNTER
Aruna from OP nursing states patient is scheduled to receive her Prolia Injection.   Aruna states she does not see recent calcium and creatine results. Wondering if our office can place orders for this. Aruna hoping patient can get these labs completed before she has injection done as it slows their process of giving injection by over an hour.    Please advise, thank you.

## 2024-06-03 ENCOUNTER — HOSPITAL ENCOUNTER (OUTPATIENT)
Dept: NURSING | Age: 74
Discharge: HOME | End: 2024-06-03

## 2024-06-03 NOTE — DISCHARGE INSTRUCTIONS
Prolia injection discharge instructions:    Side effects: headache, joint pain, rash, swelling    Next Prolia injection on: WEDNESDAY DECEMBER 4TH, 2024 AT 11:00AM    This medication is given every 6 months. We will need a new order before we schedule your next one due around:     Please call [phone] with a any questions or concerns.

## 2024-06-20 ENCOUNTER — HOSPITAL ENCOUNTER (OUTPATIENT)
Dept: NURSING | Age: 74
Discharge: HOME | End: 2024-06-20
Payer: MEDICARE

## 2024-06-20 VITALS
SYSTOLIC BLOOD PRESSURE: 151 MMHG | DIASTOLIC BLOOD PRESSURE: 67 MMHG | HEART RATE: 84 BPM | TEMPERATURE: 97.3 F | OXYGEN SATURATION: 95 % | RESPIRATION RATE: 20 BRPM

## 2024-06-20 DIAGNOSIS — M81.0 OSTEOPOROSIS, POST-MENOPAUSAL: Primary | ICD-10-CM

## 2024-06-20 LAB
ANION GAP SERPL CALC-SCNC: 12 MEQ/L (ref 8–16)
BUN SERPL-MCNC: 12 MG/DL (ref 7–22)
CALCIUM SERPL-MCNC: 9.6 MG/DL (ref 8.5–10.5)
CHLORIDE SERPL-SCNC: 104 MEQ/L (ref 98–111)
CO2 SERPL-SCNC: 26 MEQ/L (ref 23–33)
CREAT SERPL-MCNC: 0.6 MG/DL (ref 0.4–1.2)
GFR SERPL CREATININE-BSD FRML MDRD: > 90 ML/MIN/1.73M2
GLUCOSE SERPL-MCNC: 111 MG/DL (ref 70–108)
POTASSIUM SERPL-SCNC: 3.5 MEQ/L (ref 3.5–5.2)
SODIUM SERPL-SCNC: 142 MEQ/L (ref 135–145)

## 2024-06-20 PROCEDURE — 96372 THER/PROPH/DIAG INJ SC/IM: CPT

## 2024-06-20 PROCEDURE — 6360000002 HC RX W HCPCS: Performed by: FAMILY MEDICINE

## 2024-06-20 PROCEDURE — 36415 COLL VENOUS BLD VENIPUNCTURE: CPT

## 2024-06-20 PROCEDURE — 80048 BASIC METABOLIC PNL TOTAL CA: CPT

## 2024-06-20 RX ORDER — SODIUM CHLORIDE 9 MG/ML
INJECTION, SOLUTION INTRAVENOUS CONTINUOUS
OUTPATIENT
Start: 2024-12-19

## 2024-06-20 RX ORDER — DIPHENHYDRAMINE HYDROCHLORIDE 50 MG/ML
50 INJECTION INTRAMUSCULAR; INTRAVENOUS
OUTPATIENT
Start: 2024-12-19

## 2024-06-20 RX ORDER — EPINEPHRINE 1 MG/ML
0.3 INJECTION, SOLUTION INTRAMUSCULAR; SUBCUTANEOUS PRN
OUTPATIENT
Start: 2024-12-19

## 2024-06-20 RX ADMIN — DENOSUMAB 60 MG: 60 INJECTION SUBCUTANEOUS at 12:02

## 2024-06-20 NOTE — PROGRESS NOTES
1050:  ARRIVES AMBULATORY FOR PROLIA INJECTION.  PT STATES SHE HAS TOLERATED PREVIOUS INJECTIONS WITHOUT ANY ISSUES.

## 2024-06-20 NOTE — PROGRESS NOTES
1100: Patient arrived ambulatory for Prolia injection administered- patient tolerated well. Patient rights and responsibilities offered to patient.   Blood work collected and sent to lab per order. Awaiting lab work before administering Prolia.     1202: Prolia injection administered- patient tolerated well.   AVS reviewed with patient, voiced understanding. Patient discharged ambulatory.                   _m___ Safety:       (Environmental)  Muir to environment  Ensure ID band is correct and in place/ allergy band as needed  Assess for fall risk  Initiate fall precautions as applicable (fall band, side rails, etc.)  Call light within reach  Bed in low position/ wheels locked    _m___ Pain:       Assess pain level and characteristics  Administer analgesics as ordered  Assess effectiveness of pain management and report to MD as needed    _m___ Knowledge Deficit:  Assess baseline knowledge  Provide teaching at level of understanding  Provide teaching via preferred learning method  Evaluate teaching effectiveness    _m___ Hemodynamic/Respiratory Status:       (Pre and Post Procedure Monitoring)  Assess/Monitor vital signs and LOC  Assess Baseline SpO2 prior to any sedation  Obtain weight/height  Assess vital signs/ LOC until patient meets discharge criteria  Monitor procedure site and notify MD of any issues

## 2024-06-21 ENCOUNTER — TELEPHONE (OUTPATIENT)
Dept: FAMILY MEDICINE CLINIC | Age: 74
End: 2024-06-21

## 2024-06-21 NOTE — TELEPHONE ENCOUNTER
----- Message from Suraj Machado,  sent at 6/20/2024  9:38 PM EDT -----  Please let pt know that BMP is WNL  Let me know if questions, thanks!

## 2024-06-21 NOTE — TELEPHONE ENCOUNTER
Left detailed message ok per HIPAA letting patient know of the results below and a call back number incase patient had any questions or concerns

## 2024-08-07 ENCOUNTER — TELEPHONE (OUTPATIENT)
Dept: FAMILY MEDICINE CLINIC | Age: 74
End: 2024-08-07

## 2024-08-07 NOTE — TELEPHONE ENCOUNTER
Patient came to the office because she has been Summonsed to Jury Duty and has requested a note to be excused due to existing medical conditions. She states that she is not able to sit for long periods of time due to sciatica, low back pain, urinary frequency and anxiety. If approved please contact the patient at [phone] and she will come to  the note.

## 2024-09-03 RX ORDER — VALSARTAN AND HYDROCHLOROTHIAZIDE 320; 25 MG/1; MG/1
TABLET, FILM COATED ORAL
Qty: 90 TABLET | Refills: 3 | Status: SHIPPED | OUTPATIENT
Start: 2024-09-03

## 2024-09-03 NOTE — TELEPHONE ENCOUNTER
Recent Visits  Date Type Provider Dept   05/13/24 Office Visit Suraj Machado, DO [facility]   11/16/23 Office Visit Suraj Machado, DO [facility]   05/16/23 Office Visit Suraj Machado, DO [facility]   Showing recent visits within past 540 days with a meds authorizing provider and meeting all other requirements  Future Appointments  Date Type Provider Dept   11/13/24 Appointment Suraj Machado, DO [facility]   Showing future appointments within next 150 days with a meds authorizing provider and meeting all other requirements

## 2024-10-24 ENCOUNTER — TELEPHONE (OUTPATIENT)
Dept: FAMILY MEDICINE CLINIC | Age: 74
End: 2024-10-24

## 2024-10-24 DIAGNOSIS — R73.9 HYPERGLYCEMIA: Primary | ICD-10-CM

## 2024-10-24 NOTE — TELEPHONE ENCOUNTER
Pt due for fasting labs prior to next apt on 11/13/2024. Please call to have pt complete this. Thanks!    ASSESSMENT & PLAN   Diagnosis Orders   1. Hyperglycemia  Glucose, Random    Hemoglobin A1C        Future Appointments   Date Time Provider Department Center   11/13/2024  2:40 PM Suraj Machado, DO [facility]   12/23/2024 11:00 AM STR EXAM ROOM 4 [facility] [place] [facility]   5/1/2025 11:00 AM STR MAMMOGRAPHY RM2  [facility] Rad/Card

## 2024-10-24 NOTE — TELEPHONE ENCOUNTER
Pt informed of labs to be completed . Pt voiced understanding with no further questions at this time.     Lab slip mailed to pt

## 2024-11-09 LAB
GLUCOSE: 135 MG/DL (ref 70–100)
HBA1C MFR BLD: 5.9 %

## 2024-11-10 DIAGNOSIS — R73.03 PREDIABETES: Chronic | ICD-10-CM

## 2024-11-10 DIAGNOSIS — E66.9 OBESITY (BMI 30-39.9): ICD-10-CM

## 2024-11-11 ENCOUNTER — TELEPHONE (OUTPATIENT)
Dept: FAMILY MEDICINE CLINIC | Age: 74
End: 2024-11-11

## 2024-11-11 NOTE — TELEPHONE ENCOUNTER
Recent Visits  Date Type Provider Dept   05/13/24 Office Visit Suraj Machado, DO [facility]   11/16/23 Office Visit Suraj Machado, DO [facility]   Showing recent visits within past 540 days with a meds authorizing provider and meeting all other requirements  Future Appointments  Date Type Provider Dept   11/13/24 Appointment uSraj Machado, DO [facility]   Showing future appointments within next 150 days with a meds authorizing provider and meeting all other requirements

## 2024-11-11 NOTE — TELEPHONE ENCOUNTER
----- Message from Dr. Suraj Machado, DO sent at 11/10/2024  9:26 AM EST -----  Please let pt know that A1c stable at 5.9  Will discuss at f/u visit  Let me know if questions, thanks!

## 2024-11-12 NOTE — PROGRESS NOTES
Chief Complaint   Patient presents with    Medicare AWV     History obtained from the patient.    SUBJECTIVE:  María Guo is a 74 y.o. female that presents today for     -HTN:    HPI:     Taking meds as prescribed ?: yes  Tolerating well ?: yes  Side Effects ?: denies  BP at home ?: <140/90  Working on TLCS ?: yes  Chest Pain/SOB/Palpitations? denies    BP Readings from Last 3 Encounters:   11/13/24 134/76   06/20/24 (!) 151/67   05/13/24 120/78       -PreDM  Labs stable  On metformin yet for this, started by previous provider, we have continued it  Doing lifestyle changes yet    Lab Results   Component Value Date/Time    LABA1C 5.9 11/08/2024 08:56 AM    LABA1C 6.0 05/21/2024 08:10 AM    LABA1C 6.0 11/15/2023 08:16 AM    LABA1C 6.1 06/13/2023 08:45 AM    LABA1C 6.1 07/20/2022 08:47 AM    LABA1C 6.1 01/19/2022 09:52 AM     Wt Readings from Last 3 Encounters:   11/13/24 86.4 kg (190 lb 6.4 oz)   05/13/24 86.7 kg (191 lb 3.2 oz)   05/01/24 88.5 kg (195 lb)       -HLD:    HPI:    Taking meds as prescribed ?: yes  Tolerating well ?: yes  Side Effects ?: denies  Muscle Pain?: denies  Working on TLCS ?: yes      -Depression/Anxiety: depression and anxiety doing well on buspar/celexa. No SI/HI      -Osteoporosis:  Was on alendronate, on for ~ 7 years  dexa March 2021 changed from osteopenia to osteoporosis  Was on anastrozole for breast CA for 5 years  No falls or fxrs  On Prolia, 1st injection completed 2/11/2022  Last injection June 2024  Next injection is Scheduled DEC 2024  Has been on since then  DEXA MAY 2023, stable osteoporosis      -TIFFANY PRIOr VISIT:  On iron   Saw GI, neg w/u  Saw heme, neg w/u  Due for f/u labs, has order  No bleeding, melena or hematochezia    UPDATE PRIOR VISIT:   Neg w/u with GI and heme  On iron   Cbc back to WNL  No abd pain  Denies bleeding,melena or hematochezia.     UPDATE PRIOR VISIT:   Doing well  On iron once daily  Labs improved  No bleeding, melena or hematochezia     UPDATE

## 2024-11-13 ENCOUNTER — OFFICE VISIT (OUTPATIENT)
Dept: FAMILY MEDICINE CLINIC | Age: 74
End: 2024-11-13

## 2024-11-13 VITALS
BODY MASS INDEX: 35.04 KG/M2 | WEIGHT: 190.4 LBS | SYSTOLIC BLOOD PRESSURE: 134 MMHG | RESPIRATION RATE: 16 BRPM | OXYGEN SATURATION: 98 % | HEIGHT: 62 IN | DIASTOLIC BLOOD PRESSURE: 76 MMHG | HEART RATE: 76 BPM | TEMPERATURE: 97.7 F

## 2024-11-13 DIAGNOSIS — Z85.3 HISTORY OF BREAST CANCER: ICD-10-CM

## 2024-11-13 DIAGNOSIS — M81.0 OSTEOPOROSIS, POST-MENOPAUSAL: Chronic | ICD-10-CM

## 2024-11-13 DIAGNOSIS — F33.42 RECURRENT MAJOR DEPRESSIVE DISORDER, IN FULL REMISSION (HCC): ICD-10-CM

## 2024-11-13 DIAGNOSIS — R73.03 PREDIABETES: Chronic | ICD-10-CM

## 2024-11-13 DIAGNOSIS — Z23 NEED FOR INFLUENZA VACCINATION: ICD-10-CM

## 2024-11-13 DIAGNOSIS — C50.919 MALIGNANT NEOPLASM OF FEMALE BREAST, UNSPECIFIED ESTROGEN RECEPTOR STATUS, UNSPECIFIED LATERALITY, UNSPECIFIED SITE OF BREAST (HCC): ICD-10-CM

## 2024-11-13 DIAGNOSIS — Z00.00 MEDICARE ANNUAL WELLNESS VISIT, SUBSEQUENT: Primary | ICD-10-CM

## 2024-11-13 DIAGNOSIS — I10 ESSENTIAL (PRIMARY) HYPERTENSION: Chronic | ICD-10-CM

## 2024-11-13 DIAGNOSIS — E66.9 OBESITY (BMI 30-39.9): Chronic | ICD-10-CM

## 2024-11-13 DIAGNOSIS — E78.00 PURE HYPERCHOLESTEROLEMIA: Chronic | ICD-10-CM

## 2024-11-13 DIAGNOSIS — D50.0 IRON DEFICIENCY ANEMIA DUE TO CHRONIC BLOOD LOSS: ICD-10-CM

## 2024-11-13 DIAGNOSIS — F41.1 GAD (GENERALIZED ANXIETY DISORDER): Chronic | ICD-10-CM

## 2024-11-13 SDOH — ECONOMIC STABILITY: FOOD INSECURITY: WITHIN THE PAST 12 MONTHS, YOU WORRIED THAT YOUR FOOD WOULD RUN OUT BEFORE YOU GOT MONEY TO BUY MORE.: NEVER TRUE

## 2024-11-13 SDOH — ECONOMIC STABILITY: FOOD INSECURITY: WITHIN THE PAST 12 MONTHS, THE FOOD YOU BOUGHT JUST DIDN'T LAST AND YOU DIDN'T HAVE MONEY TO GET MORE.: NEVER TRUE

## 2024-11-13 SDOH — ECONOMIC STABILITY: INCOME INSECURITY: HOW HARD IS IT FOR YOU TO PAY FOR THE VERY BASICS LIKE FOOD, HOUSING, MEDICAL CARE, AND HEATING?: NOT HARD AT ALL

## 2024-11-13 ASSESSMENT — PATIENT HEALTH QUESTIONNAIRE - PHQ9
SUM OF ALL RESPONSES TO PHQ QUESTIONS 1-9: 1
4. FEELING TIRED OR HAVING LITTLE ENERGY: NOT AT ALL
7. TROUBLE CONCENTRATING ON THINGS, SUCH AS READING THE NEWSPAPER OR WATCHING TELEVISION: NOT AT ALL
SUM OF ALL RESPONSES TO PHQ QUESTIONS 1-9: 1
SUM OF ALL RESPONSES TO PHQ9 QUESTIONS 1 & 2: 1
SUM OF ALL RESPONSES TO PHQ QUESTIONS 1-9: 1
8. MOVING OR SPEAKING SO SLOWLY THAT OTHER PEOPLE COULD HAVE NOTICED. OR THE OPPOSITE, BEING SO FIGETY OR RESTLESS THAT YOU HAVE BEEN MOVING AROUND A LOT MORE THAN USUAL: NOT AT ALL
5. POOR APPETITE OR OVEREATING: NOT AT ALL
10. IF YOU CHECKED OFF ANY PROBLEMS, HOW DIFFICULT HAVE THESE PROBLEMS MADE IT FOR YOU TO DO YOUR WORK, TAKE CARE OF THINGS AT HOME, OR GET ALONG WITH OTHER PEOPLE: NOT DIFFICULT AT ALL
1. LITTLE INTEREST OR PLEASURE IN DOING THINGS: NOT AT ALL
SUM OF ALL RESPONSES TO PHQ QUESTIONS 1-9: 1
2. FEELING DOWN, DEPRESSED OR HOPELESS: SEVERAL DAYS
6. FEELING BAD ABOUT YOURSELF - OR THAT YOU ARE A FAILURE OR HAVE LET YOURSELF OR YOUR FAMILY DOWN: NOT AT ALL
3. TROUBLE FALLING OR STAYING ASLEEP: NOT AT ALL
9. THOUGHTS THAT YOU WOULD BE BETTER OFF DEAD, OR OF HURTING YOURSELF: NOT AT ALL

## 2024-11-13 ASSESSMENT — LIFESTYLE VARIABLES
HOW OFTEN DO YOU HAVE A DRINK CONTAINING ALCOHOL: NEVER
HOW MANY STANDARD DRINKS CONTAINING ALCOHOL DO YOU HAVE ON A TYPICAL DAY: PATIENT DOES NOT DRINK

## 2024-11-13 NOTE — PROGRESS NOTES
Vaccine Information Sheet, \"Influenza - Inactivated\"  given to María Guo, or parent/legal guardian of  María Guo and verbalized understanding.    Patient responses:    Have you ever had a reaction to a flu vaccine? No  Do you have an allergy to eggs, neomycin or polymixin?  No  Do you have an allergy to Thimerosal, contact lens solution, or Merthiolate? No  Have you ever had [place]illian [place] Syndrome?  No  Do you have any current illness?  No  Do you have a temperature above 100 degrees? No  Are you pregnant? No  If pregnant, permission obtained from physician? No  Do you have an active neurological disorder? No      Flu vaccine given per order. Please see immunization tab.    Immunization(s) given during visit:    Immunizations Administered       Name Date Dose Route    Influenza, FLUAD, (age 65 y+), IM, Trivalent PF, 0.5mL 11/13/2024 0.5 mL Intramuscular    Site: Deltoid- Left    Lot: [id number]    NDC: 51385-787-77            Most recent Vaccine Information Sheet dated 8.6.2021 given to pt

## 2024-11-22 ENCOUNTER — TELEPHONE (OUTPATIENT)
Dept: FAMILY MEDICINE CLINIC | Age: 74
End: 2024-11-22

## 2024-11-22 DIAGNOSIS — D50.0 IRON DEFICIENCY ANEMIA DUE TO CHRONIC BLOOD LOSS: Primary | ICD-10-CM

## 2024-11-22 LAB
BASOPHILS ABSOLUTE: 0.04 K/UL (ref 0–0.2)
BASOPHILS RELATIVE PERCENT: 0.6 % (ref 0–2)
EOSINOPHILS ABSOLUTE: 0.09 K/UL (ref 0–0.8)
EOSINOPHILS RELATIVE PERCENT: 1.4 % (ref 0–5)
FERRITIN: 407 NG/ML (ref 13–200)
HCT VFR BLD CALC: 43.5 % (ref 35–47)
HEMOGLOBIN: 14.8 G/DL (ref 11.9–16)
IMMATURE GRANS (ABS): 0.03 K/UL (ref 0–0.06)
IMMATURE GRANULOCYTES %: 0.5 % (ref 0–2)
IRON % SATURATION: 41 % (ref 13–45)
IRON: 130 UG/DL (ref 37–145)
LYMPHOCYTES ABSOLUTE: 2.32 K/UL (ref 0.9–5.2)
LYMPHOCYTES RELATIVE PERCENT: 35.6 % (ref 20–45)
MCH RBC QN AUTO: 31.8 PG (ref 26–33)
MCHC RBC AUTO-ENTMCNC: 34 G/DL (ref 32–35)
MCV RBC AUTO: 93 FL (ref 75–100)
MONOCYTES ABSOLUTE: 0.54 K/UL (ref 0.1–1)
MONOCYTES RELATIVE PERCENT: 8.3 % (ref 0–13)
NEUTROPHILS ABSOLUTE: 3.49 K/UL (ref 1.9–8)
NEUTROPHILS RELATIVE PERCENT: 53.6 % (ref 45–75)
PDW BLD-RTO: 12.4 % (ref 11.2–14.8)
PLATELET # BLD: 232 THOUS/CMM (ref 140–440)
RBC # BLD: 4.66 MILL/CMM (ref 3.8–5.2)
TOTAL IRON BINDING CAPACITY: 320 UG/DL (ref 250–450)
UNSATURATED IRON BINDING CAPACITY: 190 UG/DL (ref 112–347)
WBC # BLD: 6.5 THDS/CMM (ref 3.6–11)

## 2024-11-22 NOTE — TELEPHONE ENCOUNTER
I called and spoke to María and she verbalized understanding and had no questions at this time.     Sent lab orders to address on file per pt request.

## 2024-11-22 NOTE — TELEPHONE ENCOUNTER
----- Message from Dr. Suraj Machado, DO sent at 11/22/2024  7:06 AM EST -----  Please let pt know that cbc WNL, no anemia.   Iron levels look good as well.   On marker of iron stores is a bit elevated, this may be transient.  Con't oral iron for now, but let's repeat this in 4 wks to f/u  Fasting  Let me know if questions, thanks!

## 2024-12-03 DIAGNOSIS — F33.0 MAJOR DEPRESSIVE DISORDER, RECURRENT EPISODE, MILD (HCC): ICD-10-CM

## 2024-12-03 DIAGNOSIS — F41.9 ANXIETY: ICD-10-CM

## 2024-12-03 RX ORDER — CITALOPRAM HYDROBROMIDE 20 MG/1
TABLET ORAL
Qty: 90 TABLET | Refills: 3 | Status: SHIPPED | OUTPATIENT
Start: 2024-12-03

## 2024-12-03 NOTE — TELEPHONE ENCOUNTER
Recent Visits  Date Type Provider Dept   11/13/24 Office Visit Suraj Machado, DO [facility]   05/13/24 Office Visit Suraj Machado, DO [facility]   11/16/23 Office Visit Suraj Machado, DO [facility]   Showing recent visits within past 540 days with a meds authorizing provider and meeting all other requirements  Future Appointments  No visits were found meeting these conditions.  Showing future appointments within next 150 days with a meds authorizing provider and meeting all other requirements

## 2024-12-07 LAB
BASOPHILS ABSOLUTE: 0.04 K/UL (ref 0–0.2)
BASOPHILS RELATIVE PERCENT: 0.6 % (ref 0–2)
EOSINOPHILS ABSOLUTE: 0.13 K/UL (ref 0–0.8)
EOSINOPHILS RELATIVE PERCENT: 1.8 % (ref 0–5)
FERRITIN: 364 NG/ML (ref 13–200)
HCT VFR BLD CALC: 43.3 % (ref 35–47)
HEMOGLOBIN: 14.9 G/DL (ref 11.9–16)
IMMATURE GRANS (ABS): 0.04 K/UL (ref 0–0.06)
IMMATURE GRANULOCYTES %: 0.6 % (ref 0–2)
IRON % SATURATION: 41 % (ref 13–45)
IRON: 135 UG/DL (ref 37–145)
LYMPHOCYTES ABSOLUTE: 2.43 K/UL (ref 0.9–5.2)
LYMPHOCYTES RELATIVE PERCENT: 33.8 % (ref 20–45)
MCH RBC QN AUTO: 32 PG (ref 26–33)
MCHC RBC AUTO-ENTMCNC: 34.4 G/DL (ref 32–35)
MCV RBC AUTO: 93 FL (ref 75–100)
MONOCYTES ABSOLUTE: 0.47 K/UL (ref 0.1–1)
MONOCYTES RELATIVE PERCENT: 6.5 % (ref 0–13)
NEUTROPHILS ABSOLUTE: 4.09 K/UL (ref 1.9–8)
NEUTROPHILS RELATIVE PERCENT: 56.7 % (ref 45–75)
PDW BLD-RTO: 11.9 % (ref 11.2–14.8)
PLATELET # BLD: 298 THOUS/CMM (ref 140–440)
RBC # BLD: 4.66 MILL/CMM (ref 3.8–5.2)
TOTAL IRON BINDING CAPACITY: 330 UG/DL (ref 250–450)
UNSATURATED IRON BINDING CAPACITY: 195 UG/DL (ref 112–347)
WBC # BLD: 7.2 THDS/CMM (ref 3.6–11)

## 2024-12-08 DIAGNOSIS — D50.0 IRON DEFICIENCY ANEMIA DUE TO CHRONIC BLOOD LOSS: Primary | ICD-10-CM

## 2024-12-09 ENCOUNTER — TELEPHONE (OUTPATIENT)
Dept: FAMILY MEDICINE CLINIC | Age: 74
End: 2024-12-09

## 2024-12-09 NOTE — TELEPHONE ENCOUNTER
----- Message from Dr. Suraj Machado, DO sent at 12/8/2024  9:31 AM EST -----  Please let pt know that iron stores remain a bit elevated  Have her stop her oral iron supplement  Labs again in 6 wks, fasting  Let me know if questions, thanks!

## 2024-12-09 NOTE — TELEPHONE ENCOUNTER
Pt informed of iron results. Pt voiced understanding with no further questions at this time.       Lab slip in the mail.

## 2024-12-23 ENCOUNTER — HOSPITAL ENCOUNTER (OUTPATIENT)
Dept: NURSING | Age: 74
Discharge: HOME | End: 2024-12-23
Payer: MEDICARE

## 2024-12-23 VITALS
HEART RATE: 76 BPM | SYSTOLIC BLOOD PRESSURE: 142 MMHG | DIASTOLIC BLOOD PRESSURE: 77 MMHG | TEMPERATURE: 97.6 F | OXYGEN SATURATION: 96 %

## 2024-12-23 DIAGNOSIS — M81.0 OSTEOPOROSIS, POST-MENOPAUSAL: Primary | ICD-10-CM

## 2024-12-23 PROCEDURE — 96372 THER/PROPH/DIAG INJ SC/IM: CPT

## 2024-12-23 PROCEDURE — 6360000002 HC RX W HCPCS: Performed by: FAMILY MEDICINE

## 2024-12-23 RX ORDER — EPINEPHRINE 1 MG/ML
0.3 INJECTION, SOLUTION INTRAMUSCULAR; SUBCUTANEOUS PRN
OUTPATIENT
Start: 2025-06-16

## 2024-12-23 RX ORDER — DIPHENHYDRAMINE HYDROCHLORIDE 50 MG/ML
50 INJECTION INTRAMUSCULAR; INTRAVENOUS
OUTPATIENT
Start: 2025-06-16

## 2024-12-23 RX ORDER — SODIUM CHLORIDE 9 MG/ML
INJECTION, SOLUTION INTRAVENOUS CONTINUOUS
OUTPATIENT
Start: 2025-06-16

## 2024-12-23 RX ORDER — HYDROCORTISONE SODIUM SUCCINATE 100 MG/2ML
100 INJECTION INTRAMUSCULAR; INTRAVENOUS
OUTPATIENT
Start: 2025-06-16

## 2024-12-23 RX ADMIN — DENOSUMAB 60 MG: 60 INJECTION SUBCUTANEOUS at 10:50

## 2024-12-23 ASSESSMENT — PAIN DESCRIPTION - PAIN TYPE: TYPE: CHRONIC PAIN

## 2024-12-23 ASSESSMENT — PAIN DESCRIPTION - ORIENTATION: ORIENTATION: RIGHT;LEFT;LOWER

## 2024-12-23 ASSESSMENT — PAIN DESCRIPTION - DESCRIPTORS: DESCRIPTORS: ACHING

## 2024-12-23 ASSESSMENT — PAIN DESCRIPTION - LOCATION: LOCATION: BACK;KNEE

## 2024-12-23 ASSESSMENT — PAIN SCALES - GENERAL: PAINLEVEL_OUTOF10: 5

## 2024-12-23 NOTE — PROGRESS NOTES
1040:  ARRIVES AMBULATORY FOR PROLIA INJECTION.  PT DENIES CURRENT NEW ISSUES.    1105:  TOLERATED INJECTION WELL AND PT DISCHARGED AMBULATORY WITH INSTRUCTIONS.  PT AWARE OF NEED FOR NEW ORDER FOR NEXT INJECTION.    _M___ Safety:       (Environmental)  [place]mes to environment  Ensure ID band is correct and in place/ allergy band as needed  Assess for fall risk  Initiate fall precautions as applicable (fall band, side rails, etc.)  Call light within reach  Bed in low position/ wheels locked    _M___ Pain:       Assess pain level and characteristics  Administer analgesics as ordered  Assess effectiveness of pain management and report to MD as needed    _M___ Knowledge Deficit:  Assess baseline knowledge  Provide teaching at level of understanding  Provide teaching via preferred learning method  Evaluate teaching effectiveness    _M___ Hemodynamic/Respiratory Status:       (Pre and Post Procedure Monitoring)  Assess/Monitor vital signs and LOC  Assess Baseline SpO2 prior to any sedation  Obtain weight/height  Assess vital signs/ LOC until patient meets discharge criteria  Monitor procedure site and notify MD of any issues

## 2024-12-23 NOTE — DISCHARGE INSTRUCTIONS
Prolia injection discharge instructions:    Side effects: headache, joint pain, rash, swelling    This medication is given every 6 months. We will need a new order before we schedule your next APPT  REMIND DR SAMAYOA WHEN YOU SEE HIM IN MAY     Please call [phone] with a any questions or concerns.

## 2025-01-17 ENCOUNTER — TELEPHONE (OUTPATIENT)
Dept: ONCOLOGY | Age: 75
End: 2025-01-17

## 2025-01-17 NOTE — TELEPHONE ENCOUNTER
I called to get the patient set up with a year f/u appt, she didn't answer so I left a voicemail with appt details on there.

## 2025-01-18 LAB
BASOPHILS ABSOLUTE: 0.04 K/UL (ref 0–0.2)
BASOPHILS RELATIVE PERCENT: 0.6 % (ref 0–2)
EOSINOPHILS ABSOLUTE: 0.14 K/UL (ref 0–0.8)
EOSINOPHILS RELATIVE PERCENT: 1.9 % (ref 0–5)
FERRITIN: 390 NG/ML (ref 13–200)
HCT VFR BLD CALC: 44.2 % (ref 35–47)
HEMOGLOBIN: 15.3 G/DL (ref 11.9–16)
IMMATURE GRANS (ABS): 0.04 K/UL (ref 0–0.06)
IMMATURE GRANULOCYTES %: 0.6 % (ref 0–2)
IRON % SATURATION: 34 % (ref 13–45)
IRON: 118 UG/DL (ref 37–145)
LYMPHOCYTES ABSOLUTE: 2.57 K/UL (ref 0.9–5.2)
LYMPHOCYTES RELATIVE PERCENT: 35.6 % (ref 20–45)
MCH RBC QN AUTO: 31.9 PG (ref 26–33)
MCHC RBC AUTO-ENTMCNC: 34.6 G/DL (ref 32–35)
MCV RBC AUTO: 92 FL (ref 75–100)
MONOCYTES ABSOLUTE: 0.55 K/UL (ref 0.1–1)
MONOCYTES RELATIVE PERCENT: 7.6 % (ref 0–13)
NEUTROPHILS ABSOLUTE: 3.88 K/UL (ref 1.9–8)
NEUTROPHILS RELATIVE PERCENT: 53.7 % (ref 45–75)
PDW BLD-RTO: 11.9 % (ref 11.2–14.8)
PLATELET # BLD: 305 THOUS/CMM (ref 140–440)
RBC # BLD: 4.8 MILL/CMM (ref 3.8–5.2)
TOTAL IRON BINDING CAPACITY: 345 UG/DL (ref 250–450)
UNSATURATED IRON BINDING CAPACITY: 227 UG/DL (ref 112–347)
WBC # BLD: 7.2 THDS/CMM (ref 3.6–11)

## 2025-01-19 DIAGNOSIS — D50.0 IRON DEFICIENCY ANEMIA DUE TO CHRONIC BLOOD LOSS: Primary | ICD-10-CM

## 2025-01-20 ENCOUNTER — TELEPHONE (OUTPATIENT)
Dept: FAMILY MEDICINE CLINIC | Age: 75
End: 2025-01-20

## 2025-01-20 NOTE — TELEPHONE ENCOUNTER
Left message on answering machine. Requested pt to call back at [phone], at their earliest convenience.       Lab slip mailed.

## 2025-01-20 NOTE — TELEPHONE ENCOUNTER
----- Message from Dr. Suraj Machado, DO sent at 1/19/2025  9:03 AM EST -----  Please let pt known that iron stores improving  Con't off iron supplement  Repeat levels in 3 months, fasting  Let me know if questions, thanks!

## 2025-04-10 DIAGNOSIS — F41.1 GAD (GENERALIZED ANXIETY DISORDER): Chronic | ICD-10-CM

## 2025-04-10 RX ORDER — BUSPIRONE HYDROCHLORIDE 10 MG/1
10 TABLET ORAL 2 TIMES DAILY
Qty: 180 TABLET | Refills: 3 | Status: SHIPPED | OUTPATIENT
Start: 2025-04-10

## 2025-04-10 NOTE — TELEPHONE ENCOUNTER
Recent Visits  Date Type Provider Dept   11/13/24 Office Visit Suraj Machado, DO [facility]   05/13/24 Office Visit Suraj Machado, DO [facility]   11/16/23 Office Visit Suraj Machado, DO [facility]   Showing recent visits within past 540 days with a meds authorizing provider and meeting all other requirements  Future Appointments  Date Type Provider Dept   05/13/25 Appointment Suraj Machado, DO [facility]   Showing future appointments within next 150 days with a meds authorizing provider and meeting all other requirements

## 2025-04-16 ENCOUNTER — RESULTS FOLLOW-UP (OUTPATIENT)
Dept: FAMILY MEDICINE CLINIC | Age: 75
End: 2025-04-16

## 2025-04-16 LAB
BASOPHILS ABSOLUTE: 0.04 K/UL (ref 0–0.2)
BASOPHILS RELATIVE PERCENT: 0.6 % (ref 0–2)
EOSINOPHILS ABSOLUTE: 0.13 K/UL (ref 0–0.8)
EOSINOPHILS RELATIVE PERCENT: 1.9 % (ref 0–5)
FERRITIN: 279 NG/ML (ref 13–200)
HCT VFR BLD CALC: 43.9 % (ref 35–47)
HEMOGLOBIN: 15 G/DL (ref 11.9–16)
IMMATURE GRANS (ABS): 0.04 K/UL (ref 0–0.06)
IMMATURE GRANULOCYTES %: 0.6 % (ref 0–2)
IRON % SATURATION: 39 % (ref 13–45)
IRON: 132 UG/DL (ref 37–145)
LYMPHOCYTES ABSOLUTE: 2.38 K/UL (ref 0.9–5.2)
LYMPHOCYTES RELATIVE PERCENT: 34.5 % (ref 20–45)
MCH RBC QN AUTO: 31.8 PG (ref 26–33)
MCHC RBC AUTO-ENTMCNC: 34.2 G/DL (ref 32–35)
MCV RBC AUTO: 93 FL (ref 75–100)
MONOCYTES ABSOLUTE: 0.5 K/UL (ref 0.1–1)
MONOCYTES RELATIVE PERCENT: 7.3 % (ref 0–13)
NEUTROPHILS ABSOLUTE: 3.8 K/UL (ref 1.9–8)
NEUTROPHILS RELATIVE PERCENT: 55.1 % (ref 45–75)
PDW BLD-RTO: 11.7 % (ref 11.2–14.8)
PLATELET # BLD: 290 THOUS/CMM (ref 140–440)
RBC # BLD: 4.72 MILL/CMM (ref 3.8–5.2)
TOTAL IRON BINDING CAPACITY: 335 UG/DL (ref 250–450)
UNSATURATED IRON BINDING CAPACITY: 203 UG/DL (ref 112–347)
WBC # BLD: 6.9 THDS/CMM (ref 3.6–11)

## 2025-05-01 ENCOUNTER — HOSPITAL ENCOUNTER (OUTPATIENT)
Dept: WOMENS IMAGING | Age: 75
Discharge: HOME | End: 2025-05-01
Attending: INTERNAL MEDICINE
Payer: MEDICARE

## 2025-05-01 DIAGNOSIS — Z12.31 ENCOUNTER FOR SCREENING MAMMOGRAM FOR MALIGNANT NEOPLASM OF BREAST: ICD-10-CM

## 2025-05-01 DIAGNOSIS — C50.919 MALIGNANT NEOPLASM OF FEMALE BREAST, UNSPECIFIED ESTROGEN RECEPTOR STATUS, UNSPECIFIED LATERALITY, UNSPECIFIED SITE OF BREAST (HCC): ICD-10-CM

## 2025-05-01 PROCEDURE — 77063 BREAST TOMOSYNTHESIS BI: CPT

## 2025-05-05 ENCOUNTER — OFFICE VISIT (OUTPATIENT)
Dept: ONCOLOGY | Age: 75
End: 2025-05-05
Payer: MEDICARE

## 2025-05-05 ENCOUNTER — HOSPITAL ENCOUNTER (OUTPATIENT)
Dept: INFUSION THERAPY | Age: 75
Discharge: HOME | End: 2025-05-05
Payer: MEDICARE

## 2025-05-05 VITALS
SYSTOLIC BLOOD PRESSURE: 140 MMHG | OXYGEN SATURATION: 98 % | HEART RATE: 60 BPM | TEMPERATURE: 97.7 F | DIASTOLIC BLOOD PRESSURE: 68 MMHG | RESPIRATION RATE: 18 BRPM

## 2025-05-05 VITALS
SYSTOLIC BLOOD PRESSURE: 140 MMHG | WEIGHT: 188 LBS | RESPIRATION RATE: 18 BRPM | HEIGHT: 62 IN | OXYGEN SATURATION: 98 % | DIASTOLIC BLOOD PRESSURE: 68 MMHG | TEMPERATURE: 97.7 F | BODY MASS INDEX: 34.6 KG/M2 | HEART RATE: 60 BPM

## 2025-05-05 DIAGNOSIS — Z12.31 ENCOUNTER FOR SCREENING MAMMOGRAM FOR MALIGNANT NEOPLASM OF BREAST: Primary | ICD-10-CM

## 2025-05-05 PROCEDURE — 99211 OFF/OP EST MAY X REQ PHY/QHP: CPT

## 2025-05-05 PROCEDURE — 1159F MED LIST DOCD IN RCRD: CPT | Performed by: INTERNAL MEDICINE

## 2025-05-05 PROCEDURE — 3078F DIAST BP <80 MM HG: CPT | Performed by: INTERNAL MEDICINE

## 2025-05-05 PROCEDURE — 1126F AMNT PAIN NOTED NONE PRSNT: CPT | Performed by: INTERNAL MEDICINE

## 2025-05-05 PROCEDURE — 3077F SYST BP >= 140 MM HG: CPT | Performed by: INTERNAL MEDICINE

## 2025-05-05 PROCEDURE — 99214 OFFICE O/P EST MOD 30 MIN: CPT | Performed by: INTERNAL MEDICINE

## 2025-05-05 PROCEDURE — 1123F ACP DISCUSS/DSCN MKR DOCD: CPT | Performed by: INTERNAL MEDICINE

## 2025-05-05 NOTE — PROGRESS NOTES
[facility] [place] [facility]  [facility]  [address]  [address]  [place] [address]  Dept: [phone]  Loc: [phone]   Hematology/Oncology Progress Note (Clinic)        María JOELLEN Garzawillam  1950    27 April 2023    Karlee Zhong MD Brandeberry, Kent C, DO     DIAGNOSIS:   -Right breast infiltrating ductal carcinoma, grade 2, ER/LA strongly positive HER2 negative, 0.4 cm all 4 sentinel nodes negative.  T1 a N0 M0 stage I.  Diagnosis February 2014    -March 2021 abnormal annual mammogram with cluster of calcifications in the left breast.  Stereotactic biopsy 12/21 showed a fibroadenoma and no malignancy.  November 21 follow-up mammography unremarkable and April 2022 mammography unremarkable    -History of iron deficiency anemia in 2019 approximately resolved with oral iron.  Hemoglobin 1 year ago normal at 15.4 with normal indices.  Unremarkable EGD and colonoscopy in August 2019    -Osteoporosis.  DEXA scan in March 2021    TREATMENT:   -Right lumpectomy and sentinel node procedure 4/24/2014  -Postlumpectomy radiation completed 7/20/2014  -Arimidex began July 30 2014 x 5 years.  -Prolia, calcium and vitamin D.    Followable Disease:   Standard history exam and routine labs.  Annual mammography      Comorbidities:  See below      Subjective:   11/1/2023:   She is feeling well without new complaints. Does not feel any new lumps or enlargement in the lymph nodes or in the breasts.   Her energy is good.   Is receiving Prolia every 6 months.     5/1/2024: No new symptoms.  On Prolia and calcium and vitamin D supplements.  Discussed results of mammogram.  Iron deficiency anemia with resolution.    5/5/2025: Occasional hemorrhoidal bleeding secondary to constipation.  She is following with PCP.    ROS:  Review of Systems 14 point negative except as above.    PMH:   Past Medical History:   Diagnosis Date    Breast cancer (HCC) 04/23/2014    Essential (primary) hypertension

## 2025-05-05 NOTE — PATIENT INSTRUCTIONS
Orders Placed This Encounter   Procedures    PATEL DIGITAL SCREEN W OR WO CAD BILATERAL     Return in about 1 year (around 5/5/2026).MD + review MMG

## 2025-05-07 ENCOUNTER — TELEPHONE (OUTPATIENT)
Dept: FAMILY MEDICINE CLINIC | Age: 75
End: 2025-05-07

## 2025-05-07 DIAGNOSIS — D50.0 IRON DEFICIENCY ANEMIA DUE TO CHRONIC BLOOD LOSS: ICD-10-CM

## 2025-05-07 DIAGNOSIS — R73.9 HYPERGLYCEMIA: ICD-10-CM

## 2025-05-07 DIAGNOSIS — I10 ESSENTIAL (PRIMARY) HYPERTENSION: Primary | Chronic | ICD-10-CM

## 2025-05-08 NOTE — TELEPHONE ENCOUNTER
Pt due for fasting labs prior to next apt on 5/13/2025. Please call to have pt complete this. Thanks!    ASSESSMENT & PLAN   Diagnosis Orders   1. Essential (primary) hypertension  CBC with Auto Differential    Comprehensive Metabolic Panel    Lipid Panel    TSH reflex to FT4    Albumin/Creatinine Ratio, Urine      2. Hyperglycemia  Hemoglobin A1C      3. Iron deficiency anemia due to chronic blood loss  CBC with Auto Differential    Ferritin    Iron    Iron Binding Capacity        Future Appointments   Date Time Provider Department Center   5/13/2025  2:20 PM Suraj Machado, DO [facility]   5/1/2026 11:20 AM STR MAMMOGRAPHY RM2  [facility] Rad/Card   5/5/2026  1:30 PM Karlee Zhong MD [facility]

## 2025-05-08 NOTE — TELEPHONE ENCOUNTER
I called and spoke to pt and she verbalized understanding and is coming to  her lab orders today.     Lab orders placed in box. 5/8

## 2025-05-12 NOTE — PROGRESS NOTES
Chief Complaint   Patient presents with    Medicare AWV     History obtained from the patient.    SUBJECTIVE:  María Guo is a 75 y.o. female that presents today for     -HTN:    HPI:     Taking meds as prescribed ?: yes  Tolerating well ?: yes  Side Effects ?: denies  BP at home ?: <140/90  Working on TLCS ?: yes  Chest Pain/SOB/Palpitations? denies      -PreDM:  Labs stable  On metformin yet for this, started by previous provider, we have continued it  Doing lifestyle changes yet    Lab Results   Component Value Date/Time    LABA1C 5.8 05/12/2025 07:55 AM    LABA1C 5.9 11/08/2024 08:56 AM    LABA1C 6.0 05/21/2024 08:10 AM    LABA1C 6.0 11/15/2023 08:16 AM    LABA1C 6.1 06/13/2023 08:45 AM    LABA1C 6.1 07/20/2022 08:47 AM     Wt Readings from Last 3 Encounters:   05/13/25 86.1 kg (189 lb 12.8 oz)   05/05/25 85.3 kg (188 lb)   11/13/24 86.4 kg (190 lb 6.4 oz)       -HLD:    HPI:    Taking meds as prescribed ?: yes  Tolerating well ?: yes  Side Effects ?: denies  Muscle Pain?: denies  Working on TLCS ?: yes      -Depression/Anxiety: depression and anxiety doing well on buspar/celexa. No SI/HI      -Osteoporosis:  Was on alendronate, on for ~ 7 years  dexa March 2021 changed from osteopenia to osteoporosis  Was on anastrozole for breast CA for 5 years  No falls or fxrs  On Prolia, 1st injection completed 2/11/2022  Last injection DEC 2024  Next injection due end of JUNE 2025  Has been on since then  DEXA MAY 2023, stable osteoporosis      -TIFFANY PRIOr VISIT:  On iron   Saw GI, neg w/u  Saw heme, neg w/u  Due for f/u labs, has order  No bleeding, melena or hematochezia    UPDATE PRIOR VISIT:   Neg w/u with GI and heme  On iron   Cbc back to WNL  No abd pain  Denies bleeding,melena or hematochezia.     UPDATE PRIOR VISIT:   Doing well  On iron once daily  Labs improved  No bleeding, melena or hematochezia     UPDATE TODAY:   Anemia remains improved  Off iron  Iron stores remain WNL      Age/Gender Health

## 2025-05-13 ENCOUNTER — OFFICE VISIT (OUTPATIENT)
Dept: FAMILY MEDICINE CLINIC | Age: 75
End: 2025-05-13

## 2025-05-13 VITALS
HEIGHT: 62 IN | SYSTOLIC BLOOD PRESSURE: 128 MMHG | WEIGHT: 189.8 LBS | HEART RATE: 74 BPM | RESPIRATION RATE: 16 BRPM | DIASTOLIC BLOOD PRESSURE: 70 MMHG | BODY MASS INDEX: 34.93 KG/M2 | TEMPERATURE: 97.1 F | OXYGEN SATURATION: 97 %

## 2025-05-13 DIAGNOSIS — Z85.3 HISTORY OF BREAST CANCER: ICD-10-CM

## 2025-05-13 DIAGNOSIS — R73.03 PREDIABETES: Chronic | ICD-10-CM

## 2025-05-13 DIAGNOSIS — F41.1 GAD (GENERALIZED ANXIETY DISORDER): Chronic | ICD-10-CM

## 2025-05-13 DIAGNOSIS — Z00.00 MEDICARE ANNUAL WELLNESS VISIT, SUBSEQUENT: Primary | ICD-10-CM

## 2025-05-13 DIAGNOSIS — D50.0 IRON DEFICIENCY ANEMIA DUE TO CHRONIC BLOOD LOSS: ICD-10-CM

## 2025-05-13 DIAGNOSIS — I10 ESSENTIAL (PRIMARY) HYPERTENSION: Chronic | ICD-10-CM

## 2025-05-13 DIAGNOSIS — E66.9 OBESITY (BMI 30-39.9): Chronic | ICD-10-CM

## 2025-05-13 DIAGNOSIS — F33.42 RECURRENT MAJOR DEPRESSIVE DISORDER, IN FULL REMISSION: ICD-10-CM

## 2025-05-13 DIAGNOSIS — R73.9 HYPERGLYCEMIA: ICD-10-CM

## 2025-05-13 DIAGNOSIS — E78.00 PURE HYPERCHOLESTEROLEMIA: Chronic | ICD-10-CM

## 2025-05-13 DIAGNOSIS — M81.0 OSTEOPOROSIS, POST-MENOPAUSAL: Chronic | ICD-10-CM

## 2025-05-13 DIAGNOSIS — C50.919 MALIGNANT NEOPLASM OF FEMALE BREAST, UNSPECIFIED ESTROGEN RECEPTOR STATUS, UNSPECIFIED LATERALITY, UNSPECIFIED SITE OF BREAST (HCC): ICD-10-CM

## 2025-05-13 LAB
ALBUMIN: 5.1 G/DL (ref 3.5–5.2)
ALK PHOSPHATASE: 75 U/L (ref 30–146)
ALT SERPL-CCNC: 29 U/L (ref 5–33)
ANION GAP SERPL CALCULATED.3IONS-SCNC: 16 MMOL/L (ref 7–16)
AST SERPL-CCNC: 23 U/L (ref 9–40)
BASOPHILS ABSOLUTE: 0.04 K/UL (ref 0–0.2)
BASOPHILS RELATIVE PERCENT: 0.7 % (ref 0–2)
BILIRUB SERPL-MCNC: 0.5 MG/DL
BUN BLDV-MCNC: 15 MG/DL (ref 8–23)
CALCIUM SERPL-MCNC: 9.9 MG/DL (ref 8.6–10.5)
CHLORIDE BLD-SCNC: 102 MMOL/L (ref 96–107)
CHOLESTEROL, TOTAL: 149 MG/DL (ref 100–199)
CHOLESTEROL/HDL RATIO: 2.7 (ref 2–4.5)
CO2: 29 MMOL/L (ref 18–32)
CREAT SERPL-MCNC: 0.97 MG/DL (ref 0.51–1.15)
CREATINE, URINE: 317.2 MG/DL
EGFR IF NONAFRICAN AMERICAN: 60 ML/MIN/1.73M2
EOSINOPHILS ABSOLUTE: 0.1 K/UL (ref 0–0.8)
EOSINOPHILS RELATIVE PERCENT: 1.8 % (ref 0–5)
ESTIMATED AVERAGE GLUCOSE: 120 MG/DL
FERRITIN: 312 NG/ML (ref 13–200)
GLUCOSE: 120 MG/DL (ref 70–100)
HBA1C MFR BLD: 5.8 %
HCT VFR BLD CALC: 42.5 % (ref 35–47)
HDLC SERPL-MCNC: 55 MG/DL
HEMOGLOBIN: 14.3 G/DL (ref 11.9–16)
IMMATURE GRANS (ABS): 0.03 K/UL (ref 0–0.06)
IMMATURE GRANULOCYTES %: 0.5 % (ref 0–2)
IRON % SATURATION: 27 % (ref 13–45)
IRON: 91 UG/DL (ref 37–145)
LDL CHOLESTEROL: 62 MG/DL
LDL/HDL RATIO: 1.1
LYMPHOCYTES ABSOLUTE: 1.98 K/UL (ref 0.9–5.2)
LYMPHOCYTES RELATIVE PERCENT: 34.7 % (ref 20–45)
MCH RBC QN AUTO: 31.8 PG (ref 26–33)
MCHC RBC AUTO-ENTMCNC: 33.6 G/DL (ref 32–35)
MCV RBC AUTO: 95 FL (ref 75–100)
MICROALBUMIN/CREAT 24H UR: 27.6 MG/L
MICROALBUMIN/CREAT UR-RTO: 9 MG/G
MONOCYTES ABSOLUTE: 0.51 K/UL (ref 0.1–1)
MONOCYTES RELATIVE PERCENT: 8.9 % (ref 0–13)
NEUTROPHILS ABSOLUTE: 3.05 K/UL (ref 1.9–8)
NEUTROPHILS RELATIVE PERCENT: 53.4 % (ref 45–75)
PDW BLD-RTO: 11.8 % (ref 11.2–14.8)
PLATELET # BLD: 277 THOUS/CMM (ref 140–440)
POTASSIUM SERPL-SCNC: 4.2 MMOL/L (ref 3.5–5.4)
RBC # BLD: 4.49 MILL/CMM (ref 3.8–5.2)
SODIUM BLD-SCNC: 147 MMOL/L (ref 135–148)
TOTAL IRON BINDING CAPACITY: 335 UG/DL (ref 250–450)
TOTAL PROTEIN: 6.7 G/DL (ref 6–8.3)
TRIGL SERPL-MCNC: 159 MG/DL (ref 20–149)
TSH SERPL DL<=0.05 MIU/L-ACNC: 2.16 UIU/ML (ref 0.27–4.2)
UNSATURATED IRON BINDING CAPACITY: 244 UG/DL (ref 112–347)
VLDLC SERPL CALC-MCNC: 32 MG/DL
WBC # BLD: 5.7 THDS/CMM (ref 3.6–11)

## 2025-05-13 SDOH — ECONOMIC STABILITY: FOOD INSECURITY: WITHIN THE PAST 12 MONTHS, YOU WORRIED THAT YOUR FOOD WOULD RUN OUT BEFORE YOU GOT MONEY TO BUY MORE.: NEVER TRUE

## 2025-05-13 SDOH — ECONOMIC STABILITY: FOOD INSECURITY: WITHIN THE PAST 12 MONTHS, THE FOOD YOU BOUGHT JUST DIDN'T LAST AND YOU DIDN'T HAVE MONEY TO GET MORE.: NEVER TRUE

## 2025-05-13 ASSESSMENT — PATIENT HEALTH QUESTIONNAIRE - PHQ9
1. LITTLE INTEREST OR PLEASURE IN DOING THINGS: NOT AT ALL
SUM OF ALL RESPONSES TO PHQ QUESTIONS 1-9: 2
SUM OF ALL RESPONSES TO PHQ QUESTIONS 1-9: 2
5. POOR APPETITE OR OVEREATING: SEVERAL DAYS
9. THOUGHTS THAT YOU WOULD BE BETTER OFF DEAD, OR OF HURTING YOURSELF: NOT AT ALL
SUM OF ALL RESPONSES TO PHQ QUESTIONS 1-9: 2
SUM OF ALL RESPONSES TO PHQ QUESTIONS 1-9: 2
3. TROUBLE FALLING OR STAYING ASLEEP: NOT AT ALL
2. FEELING DOWN, DEPRESSED OR HOPELESS: SEVERAL DAYS
8. MOVING OR SPEAKING SO SLOWLY THAT OTHER PEOPLE COULD HAVE NOTICED. OR THE OPPOSITE, BEING SO FIGETY OR RESTLESS THAT YOU HAVE BEEN MOVING AROUND A LOT MORE THAN USUAL: NOT AT ALL
4. FEELING TIRED OR HAVING LITTLE ENERGY: NOT AT ALL
6. FEELING BAD ABOUT YOURSELF - OR THAT YOU ARE A FAILURE OR HAVE LET YOURSELF OR YOUR FAMILY DOWN: NOT AT ALL
10. IF YOU CHECKED OFF ANY PROBLEMS, HOW DIFFICULT HAVE THESE PROBLEMS MADE IT FOR YOU TO DO YOUR WORK, TAKE CARE OF THINGS AT HOME, OR GET ALONG WITH OTHER PEOPLE: NOT DIFFICULT AT ALL
7. TROUBLE CONCENTRATING ON THINGS, SUCH AS READING THE NEWSPAPER OR WATCHING TELEVISION: NOT AT ALL

## 2025-05-14 DIAGNOSIS — M81.0 OSTEOPOROSIS, POST-MENOPAUSAL: Chronic | ICD-10-CM

## 2025-05-14 RX ORDER — HYDROCORTISONE SODIUM SUCCINATE 100 MG/2ML
100 INJECTION INTRAMUSCULAR; INTRAVENOUS
OUTPATIENT
Start: 2025-06-24

## 2025-05-14 RX ORDER — DIPHENHYDRAMINE HYDROCHLORIDE 50 MG/ML
50 INJECTION, SOLUTION INTRAMUSCULAR; INTRAVENOUS
OUTPATIENT
Start: 2025-06-24

## 2025-05-14 RX ORDER — SODIUM CHLORIDE 9 MG/ML
INJECTION, SOLUTION INTRAVENOUS CONTINUOUS
OUTPATIENT
Start: 2025-06-24

## 2025-05-14 RX ORDER — EPINEPHRINE 1 MG/ML
0.3 INJECTION, SOLUTION, CONCENTRATE INTRAVENOUS PRN
OUTPATIENT
Start: 2025-06-24

## 2025-05-17 DIAGNOSIS — E78.00 PURE HYPERCHOLESTEROLEMIA: Chronic | ICD-10-CM

## 2025-05-19 RX ORDER — ATORVASTATIN CALCIUM 20 MG/1
20 TABLET, FILM COATED ORAL DAILY
Qty: 90 TABLET | Refills: 3 | Status: SHIPPED | OUTPATIENT
Start: 2025-05-19

## 2025-05-19 NOTE — TELEPHONE ENCOUNTER
Recent Visits  Date Type Provider Dept   05/13/25 Office Visit Suraj Machado, DO [facility]   11/13/24 Office Visit Suraj Machado, DO [facility]   05/13/24 Office Visit Suraj Machado, DO [facility]   Showing recent visits within past 540 days with a meds authorizing provider and meeting all other requirements  Future Appointments  No visits were found meeting these conditions.  Showing future appointments within next 150 days with a meds authorizing provider and meeting all other requirements

## 2025-05-27 ENCOUNTER — TELEPHONE (OUTPATIENT)
Dept: FAMILY MEDICINE CLINIC | Age: 75
End: 2025-05-27

## 2025-05-27 ENCOUNTER — HOSPITAL ENCOUNTER (OUTPATIENT)
Dept: WOMENS IMAGING | Age: 75
Discharge: HOME | End: 2025-05-27
Attending: FAMILY MEDICINE
Payer: MEDICARE

## 2025-05-27 ENCOUNTER — RESULTS FOLLOW-UP (OUTPATIENT)
Dept: FAMILY MEDICINE CLINIC | Age: 75
End: 2025-05-27

## 2025-05-27 DIAGNOSIS — M81.0 OSTEOPOROSIS, POST-MENOPAUSAL: Chronic | ICD-10-CM

## 2025-05-27 PROCEDURE — 77080 DXA BONE DENSITY AXIAL: CPT

## 2025-05-27 NOTE — TELEPHONE ENCOUNTER
Result Note  Let María know her DEXA scan has improved from osteoporosis to osteopenia, which is just the thinning of the bones. So the Prolia is working. I'm ok to con't the Prolia for now. Looks like she's due to receive her next dose in June.  DEXA BONE DENSITY AXIAL SKELETON

## 2025-05-28 NOTE — TELEPHONE ENCOUNTER
Pt informed of lab results. Pt voiced understanding with no further questions at this time.       Prolia form filled out and in providers box to sign.

## 2025-08-26 DIAGNOSIS — F41.9 ANXIETY: ICD-10-CM

## 2025-08-26 DIAGNOSIS — F33.0 MAJOR DEPRESSIVE DISORDER, RECURRENT EPISODE, MILD: ICD-10-CM

## 2025-08-26 RX ORDER — CITALOPRAM HYDROBROMIDE 20 MG/1
20 TABLET ORAL DAILY
Qty: 90 TABLET | Refills: 3 | Status: SHIPPED | OUTPATIENT
Start: 2025-08-26

## 2025-09-04 RX ORDER — VALSARTAN AND HYDROCHLOROTHIAZIDE 320; 25 MG/1; MG/1
1 TABLET, FILM COATED ORAL DAILY
Qty: 90 TABLET | Refills: 3 | Status: SHIPPED | OUTPATIENT
Start: 2025-09-04